# Patient Record
Sex: FEMALE | Race: WHITE | Employment: PART TIME | URBAN - METROPOLITAN AREA
[De-identification: names, ages, dates, MRNs, and addresses within clinical notes are randomized per-mention and may not be internally consistent; named-entity substitution may affect disease eponyms.]

---

## 2020-02-16 ENCOUNTER — HOSPITAL ENCOUNTER (EMERGENCY)
Age: 25
Discharge: HOME | End: 2020-02-16
Attending: EMERGENCY MEDICINE
Payer: COMMERCIAL

## 2020-02-16 VITALS
DIASTOLIC BLOOD PRESSURE: 73 MMHG | SYSTOLIC BLOOD PRESSURE: 139 MMHG | HEIGHT: 62 IN | BODY MASS INDEX: 40.48 KG/M2 | HEART RATE: 101 BPM | OXYGEN SATURATION: 98 % | TEMPERATURE: 97.5 F | WEIGHT: 220 LBS | RESPIRATION RATE: 16 BRPM

## 2020-02-16 PROCEDURE — 99282 EMERGENCY DEPT VISIT SF MDM: CPT

## 2020-02-16 RX ORDER — SULFAMETHOXAZOLE AND TRIMETHOPRIM 800; 160 MG/1; MG/1
1 TABLET ORAL 2 TIMES DAILY
Qty: 14 TABLET | Refills: 0 | Status: SHIPPED | OUTPATIENT
Start: 2020-02-16 | End: 2020-02-23

## 2020-02-16 RX ORDER — TRAMADOL HYDROCHLORIDE 50 MG/1
50 TABLET ORAL EVERY 8 HOURS PRN
Qty: 15 TABLET | Refills: 0 | Status: SHIPPED | OUTPATIENT
Start: 2020-02-16 | End: 2020-02-21

## 2020-02-16 RX ORDER — CEPHALEXIN 500 MG/1
500 CAPSULE ORAL 4 TIMES DAILY
Qty: 28 CAPSULE | Refills: 0 | Status: SHIPPED | OUTPATIENT
Start: 2020-02-16 | End: 2020-02-23

## 2020-02-16 SDOH — HEALTH STABILITY: MENTAL HEALTH: HOW OFTEN DO YOU HAVE A DRINK CONTAINING ALCOHOL?: NEVER

## 2020-02-16 ASSESSMENT — PAIN SCALES - GENERAL: PAINLEVEL_OUTOF10: 9

## 2020-02-16 NOTE — ED NOTES
Pt given instructions for follow-up and discharge. Pt given education on prescriptions. Pt verbalizes understanding. Pt is A&O x4, PWD, eupneic, and ambulatory with steady, even gait upon discharge.       Vanna Tipton RN  02/16/20 7800

## 2020-02-16 NOTE — ED PROVIDER NOTES
[address]  eMERGENCY dEPARTMENT eNCOUnter      Pt Name: Deedee Donald  MRN: [id number]  [place] 1995  Date of evaluation: 2/16/2020  Provider: ADRIANO Eduardo    CHIEF COMPLAINT       Chief Complaint   Patient presents with    Abscess     Lower lip           HISTORY OF PRESENT ILLNESS  (Location/Symptom, Timing/Onset, Context/Setting, Quality, Duration, Modifying Factors, Severity.)   Deedee Donald is a 30 y.o. female who presents to the emergency department c/o rash to lower lip/chin. States rash started 3 days ago. Denies any trouble breathing or swallowing. Denies any abd pain, cp, sob, n/v/d/c. Denies any fevers. States had small pimple 3 days ago and poked it with a needle at home      Context/Setting:   Quality: itchy  Duration: constant  Modifying Factors: none  Severity: mild    Nursing Notes were reviewed. REVIEW OF SYSTEMS    (2-9 systems for level 4, 10 or more for level 5)     Review of Systems   C/o rash  Denies trouble breathing  Denies cp  Denies fevers. Except as noted above the remainder of the review of systems was reviewed and negative. PAST MEDICAL HISTORY     Past Medical History:   Diagnosis Date    Anxiety     Asthma     Depression     Headache     IBS (irritable bowel syndrome)      None otherwise stated in nurses notes    SURGICAL HISTORY     History reviewed. No pertinent surgical history. None otherwise stated in nurses notes    CURRENT MEDICATIONS       Previous Medications    No medications on file       ALLERGIES     Gentamicin and Hydrocodone    FAMILY HISTORY     History reviewed. No pertinent family history. No family status information on file. None otherwise stated in nurses notes    SOCIAL HISTORY      reports that she has been smoking. She has never used smokeless tobacco. She reports that she does not drink alcohol or use drugs.    lives at home with others     PHYSICAL EXAM    (up to 7 for level 4, 8 or more for level 5)     ED Triage Vitals [02/16/20 1636]   BP Temp Temp Source Pulse Resp SpO2 Height Weight   139/73 97.5 °F (36.4 °C) Oral 101 16 98 % 5' 2\" (1.575 m) 220 lb (99.8 kg)       Physical Exam   Nursing note and vitals reviewed. Constitutional: Oriented to person, place, and time and well-developed, well-nourished. Head: Normocephalic and atraumatic. Ear: External ears normal.   Nose: Nose normal and midline. Eyes: Conjunctivae and EOM are normal. Pupils are equal, round, and reactive to light. Neck: Normal range of motion. Throat: Posterior pharynx is without erythema or exudates, airway is patent, no swelling    Musculoskeletal: Normal range of motion. Neurological: Alert and oriented to person, place, and time. GCS score is 15. Skin: rash to lower lip/chin consistant with cellulitis. No abscess or fluctuance  Psychiatric: Mood, memory, affect and judgment normal.           DIAGNOSTIC RESULTS     EKG: All EKG's are interpreted by the Emergency Department Physician who either signs or Co-signs this chart in the absence of a cardiologist.        RADIOLOGY:   All plain film, CT, MRI, and formal ultrasound images (except ED bedside ultrasound) are read by the radiologist and the images and interpretations are directly viewed by the emergency physician. No orders to display       No results found. LABS:  Labs Reviewed - No data to display    All other labs were within normal range or not returned as of this dictation. EMERGENCY DEPARTMENT COURSE and DIFFERENTIAL DIAGNOSIS/MDM:   Vitals:    Vitals:    02/16/20 1636   BP: 139/73   Pulse: 101   Resp: 16   Temp: 97.5 °F (36.4 °C)   TempSrc: Oral   SpO2: 98%   Weight: 220 lb (99.8 kg)   Height: 5' 2\" (1.575 m)       Warm compresses, abx. Pain meds.  F/u with clinic list  Patient instructed to return to the emergency room if symptoms worsen, return, or any other concern right away which is agreed by the patient    ED MEDS:  Orders Placed This Encounter Medications    cephALEXin (KEFLEX) 500 MG capsule     Sig: Take 1 capsule by mouth 4 times daily for 7 days     Dispense:  28 capsule     Refill:  0    sulfamethoxazole-trimethoprim (BACTRIM DS) 800-160 MG per tablet     Sig: Take 1 tablet by mouth 2 times daily for 7 days     Dispense:  14 tablet     Refill:  0    traMADol (ULTRAM) 50 MG tablet     Sig: Take 1 tablet by mouth every 8 hours as needed for Pain for up to 5 days. Intended supply: 3 days. Take lowest dose possible to manage pain     Dispense:  15 tablet     Refill:  0         CONSULTS:  None    PROCEDURES:  None      FINAL IMPRESSION      1. Facial cellulitis          DISPOSITION/PLAN   DISPOSITION Decision To Discharge    PATIENT REFERRED TO:  clinic list    Schedule an appointment as soon as possible for a visit       [facility] ED  [place] [id number] [phone]    For worsening symptoms, or any other concern      DISCHARGE MEDICATIONS:  New Prescriptions    CEPHALEXIN (KEFLEX) 500 MG CAPSULE    Take 1 capsule by mouth 4 times daily for 7 days    SULFAMETHOXAZOLE-TRIMETHOPRIM (BACTRIM DS) 800-160 MG PER TABLET    Take 1 tablet by mouth 2 times daily for 7 days    TRAMADOL (ULTRAM) 50 MG TABLET    Take 1 tablet by mouth every 8 hours as needed for Pain for up to 5 days. Intended supply: 3 days.  Take lowest dose possible to manage pain       (Please note that portions of this note were completed with a voice recognition program.  Efforts were made to edit the dictations but occasionally words are mis-transcribed.)    Arlyn Ramos, [facility] [place]  02/16/20 4429

## 2020-02-16 NOTE — ED PROVIDER NOTES
[address]  Emergency Department  Independent Attestation     Pt Name: Giovanny Westbrook  MRN: [id number]  [place] 1995  Date of evaluation: 2/16/20       Giovanny Westbrook is a 30 y.o. female who presents with Abscess (Lower lip)      I was personally available for consultation in the Emergency Department.     Brendon Cuba DO  Attending Emergency Physician  [address]      (Please note that portions of this note were completed with a voice recognition program.  Efforts were made to edit the dictations but occasionally words are mis-transcribed.)        Brendon Cuba DO  02/16/20 8904

## 2020-02-16 NOTE — LETTER
[address] ED  [address] [facility] [address]  Phone: [phone]             February 16, 2020    Patient: Jessica Person   YOB: 1995   Date of Visit: 2/16/2020       To Whom It May Concern:    Jessica Person was seen and treated in our emergency department on 2/16/2020. She may return to work on 2/18/2020.       Sincerely,             Signature:__________________________________

## 2020-03-02 ENCOUNTER — HOSPITAL ENCOUNTER (EMERGENCY)
Age: 25
Discharge: HOME | End: 2020-03-02
Attending: EMERGENCY MEDICINE
Payer: COMMERCIAL

## 2020-03-02 ENCOUNTER — APPOINTMENT (OUTPATIENT)
Dept: GENERAL RADIOLOGY | Age: 25
End: 2020-03-02
Payer: COMMERCIAL

## 2020-03-02 VITALS
DIASTOLIC BLOOD PRESSURE: 69 MMHG | RESPIRATION RATE: 18 BRPM | HEART RATE: 90 BPM | WEIGHT: 220 LBS | OXYGEN SATURATION: 99 % | TEMPERATURE: 98 F | BODY MASS INDEX: 40.24 KG/M2 | SYSTOLIC BLOOD PRESSURE: 123 MMHG

## 2020-03-02 LAB
D-DIMER QUANTITATIVE: 0.41 MG/L FEU (ref 0–0.59)
DIRECT EXAM: NORMAL
Lab: NORMAL
SPECIMEN DESCRIPTION: NORMAL

## 2020-03-02 PROCEDURE — 36415 COLL VENOUS BLD VENIPUNCTURE: CPT

## 2020-03-02 PROCEDURE — 71046 X-RAY EXAM CHEST 2 VIEWS: CPT

## 2020-03-02 PROCEDURE — 99285 EMERGENCY DEPT VISIT HI MDM: CPT

## 2020-03-02 PROCEDURE — 96372 THER/PROPH/DIAG INJ SC/IM: CPT

## 2020-03-02 PROCEDURE — 6360000002 HC RX W HCPCS: Performed by: PHYSICIAN ASSISTANT

## 2020-03-02 PROCEDURE — 87804 INFLUENZA ASSAY W/OPTIC: CPT

## 2020-03-02 PROCEDURE — 85379 FIBRIN DEGRADATION QUANT: CPT

## 2020-03-02 PROCEDURE — 6370000000 HC RX 637 (ALT 250 FOR IP): Performed by: PHYSICIAN ASSISTANT

## 2020-03-02 RX ORDER — PREDNISONE 20 MG/1
60 TABLET ORAL ONCE
Status: COMPLETED | OUTPATIENT
Start: 2020-03-02 | End: 2020-03-02

## 2020-03-02 RX ORDER — BENZONATATE 100 MG/1
100 CAPSULE ORAL 3 TIMES DAILY PRN
Qty: 20 CAPSULE | Refills: 0 | Status: SHIPPED | OUTPATIENT
Start: 2020-03-02 | End: 2020-03-09

## 2020-03-02 RX ORDER — METHYLPREDNISOLONE 4 MG/1
TABLET ORAL
Qty: 1 KIT | Refills: 0 | Status: SHIPPED | OUTPATIENT
Start: 2020-03-02 | End: 2020-03-08

## 2020-03-02 RX ORDER — LORAZEPAM 2 MG/ML
1 INJECTION INTRAMUSCULAR ONCE
Status: DISCONTINUED | OUTPATIENT
Start: 2020-03-02 | End: 2020-03-02

## 2020-03-02 RX ORDER — ALBUTEROL SULFATE 90 UG/1
2 AEROSOL, METERED RESPIRATORY (INHALATION) EVERY 4 HOURS PRN
Qty: 1 INHALER | Refills: 0 | Status: SHIPPED | OUTPATIENT
Start: 2020-03-02 | End: 2020-05-12 | Stop reason: SDUPTHER

## 2020-03-02 RX ORDER — GUAIFENESIN/DEXTROMETHORPHAN 100-10MG/5
5 SYRUP ORAL 4 TIMES DAILY PRN
Qty: 1 BOTTLE | Refills: 0 | Status: SHIPPED | OUTPATIENT
Start: 2020-03-02 | End: 2020-03-12

## 2020-03-02 RX ORDER — HYDROXYZINE HYDROCHLORIDE 25 MG/1
25 TABLET, FILM COATED ORAL EVERY 6 HOURS PRN
Qty: 20 TABLET | Refills: 0 | Status: SHIPPED | OUTPATIENT
Start: 2020-03-02 | End: 2020-03-12

## 2020-03-02 RX ORDER — HYDROXYZINE HYDROCHLORIDE 50 MG/ML
50 INJECTION, SOLUTION INTRAMUSCULAR ONCE
Status: COMPLETED | OUTPATIENT
Start: 2020-03-02 | End: 2020-03-02

## 2020-03-02 RX ORDER — BENZONATATE 100 MG/1
200 CAPSULE ORAL ONCE
Status: COMPLETED | OUTPATIENT
Start: 2020-03-02 | End: 2020-03-02

## 2020-03-02 RX ADMIN — PREDNISONE 60 MG: 20 TABLET ORAL at 11:45

## 2020-03-02 RX ADMIN — BENZONATATE 200 MG: 100 CAPSULE ORAL at 10:25

## 2020-03-02 RX ADMIN — HYDROXYZINE HYDROCHLORIDE 50 MG: 50 INJECTION, SOLUTION INTRAMUSCULAR at 11:45

## 2020-03-02 ASSESSMENT — PAIN DESCRIPTION - LOCATION: LOCATION: GENERALIZED

## 2020-03-02 ASSESSMENT — PAIN SCALES - GENERAL: PAINLEVEL_OUTOF10: 7

## 2020-03-02 ASSESSMENT — PAIN DESCRIPTION - PAIN TYPE: TYPE: ACUTE PAIN

## 2020-03-02 NOTE — ED PROVIDER NOTES
EMERGENCY DEPARTMENT ENCOUNTER   INDEPENDENT ATTESTATION     Pt Name: Dax Cueva  MRN: [id number]  [place] 1995  Date of evaluation: 3/2/20     Dax Cueva is a 30 y.o. female with CC: Cough and Shortness of Breath      I was personally available for consultation in the Emergency Department.     Capo Mccann MD  Attending Emergency Physician                    Capo Mccann MD  03/02/20 8786

## 2020-03-02 NOTE — ED PROVIDER NOTES
difficulty breathing, no distress. Instructed to return to the emergency room if symptoms worsen, return, or any other concern right away which is agreed by the patient. Instructed to f/u with PCP in 2-3 days for re-evaluation. ED MEDS:  Orders Placed This Encounter   Medications    benzonatate (TESSALON) capsule 200 mg    predniSONE (DELTASONE) tablet 60 mg    albuterol sulfate  (90 Base) MCG/ACT inhaler     Sig: Inhale 2 puffs into the lungs every 4 hours as needed for Wheezing     Dispense:  1 Inhaler     Refill:  0    benzonatate (TESSALON) 100 MG capsule     Sig: Take 1 capsule by mouth 3 times daily as needed for Cough     Dispense:  20 capsule     Refill:  0    guaiFENesin-dextromethorphan (ROBITUSSIN DM) 100-10 MG/5ML syrup     Sig: Take 5 mLs by mouth 4 times daily as needed for Cough     Dispense:  1 Bottle     Refill:  0    methylPREDNISolone (MEDROL DOSEPACK) 4 MG tablet     Sig: Take by mouth. Dispense:  1 kit     Refill:  0    DISCONTD: LORazepam (ATIVAN) injection 1 mg    hydrOXYzine (VISTARIL) injection 50 mg    hydrOXYzine (ATARAX) 25 MG tablet     Sig: Take 1 tablet by mouth every 6 hours as needed for Anxiety     Dispense:  20 tablet     Refill:  0         CONSULTS:  None    PROCEDURES:  None      FINAL IMPRESSION      1.  Bronchitis          DISPOSITION/PLAN   DISPOSITION Decision To Discharge    PATIENT REFERRED TO:  your doctor or clinic list    Schedule an appointment as soon as possible for a visit       [facility] ED  Fedenirav Lomas [address] [place] [address]  [phone]    For worsening symptoms, or any other concern      DISCHARGE MEDICATIONS:  Discharge Medication List as of 3/2/2020 12:22 PM      START taking these medications    Details   albuterol sulfate  (90 Base) MCG/ACT inhaler Inhale 2 puffs into the lungs every 4 hours as needed for Wheezing, Disp-1 Inhaler, R-0Print      benzonatate (TESSALON) 100 MG capsule Take 1 capsule by mouth 3 times daily as needed for Cough, Disp-20 capsule, R-0Print      guaiFENesin-dextromethorphan (ROBITUSSIN DM) 100-10 MG/5ML syrup Take 5 mLs by mouth 4 times daily as needed for Cough, Disp-1 Bottle, R-0Print      methylPREDNISolone (MEDROL DOSEPACK) 4 MG tablet Take by mouth., Disp-1 kit, R-0Print      hydrOXYzine (ATARAX) 25 MG tablet Take 1 tablet by mouth every 6 hours as needed for Anxiety, Disp-20 tablet, R-0Print             (Please note that portions of this note were completed with a voice recognition program.  Efforts were made to edit the dictations but occasionally words are mis-transcribed.)    David Reyes, [address], [place]  03/03/20 1479

## 2020-03-31 ENCOUNTER — HOSPITAL ENCOUNTER (EMERGENCY)
Age: 25
Discharge: HOME | End: 2020-03-31
Payer: COMMERCIAL

## 2020-03-31 VITALS
SYSTOLIC BLOOD PRESSURE: 140 MMHG | BODY MASS INDEX: 40.48 KG/M2 | HEART RATE: 101 BPM | RESPIRATION RATE: 16 BRPM | TEMPERATURE: 97.3 F | WEIGHT: 220 LBS | HEIGHT: 62 IN | OXYGEN SATURATION: 100 % | DIASTOLIC BLOOD PRESSURE: 68 MMHG

## 2020-03-31 PROCEDURE — 99282 EMERGENCY DEPT VISIT SF MDM: CPT

## 2020-03-31 PROCEDURE — 12001 RPR S/N/AX/GEN/TRNK 2.5CM/<: CPT

## 2020-03-31 NOTE — ED PROVIDER NOTES
[address] ED  eMERGENCY dEPARTMENT eNCOUnter      Pt Name: Cloria Cheadle  MRN: [id number]  [place] 1995  Date of evaluation: 3/31/20      CHIEF COMPLAINT       Chief Complaint   Patient presents with    Laceration     Lt finger lacerations         HISTORY OF PRESENT ILLNESS    Cloria Cheadle is a 30 y.o. female who presents complaining of finger lacerations  The history is provided by the patient. Laceration   Location: She was using a slicer at work and she ran her left fifth finger and left fourth finger across the blade causing avulsion injury to the fourth and fifth finger. Depth:  Cutaneous  Bleeding: venous    Laceration mechanism:  Metal edge  Pain details:     Quality:  Aching    Severity:  No pain    Timing:  Intermittent  Foreign body present:  No foreign bodies  Relieved by:  Nothing  Worsened by:  Nothing  Ineffective treatments:  None tried  Tetanus status:  Up to date      REVIEW OF SYSTEMS       Review of Systems   Skin: Positive for wound. All other systems reviewed and are negative. PAST MEDICAL HISTORY     Past Medical History:   Diagnosis Date    Anxiety     Asthma     Depression     Headache     IBS (irritable bowel syndrome)        SURGICAL HISTORY     History reviewed. No pertinent surgical history. CURRENT MEDICATIONS       Discharge Medication List as of 3/31/2020  3:04 PM      CONTINUE these medications which have NOT CHANGED    Details   albuterol sulfate  (90 Base) MCG/ACT inhaler Inhale 2 puffs into the lungs every 4 hours as needed for Wheezing, Disp-1 Inhaler, R-0Print             ALLERGIES     is allergic to gentamicin and hydrocodone. FAMILY HISTORY     has no family status information on file. SOCIAL HISTORY      reports that she has been smoking cigarettes. She has a 10.00 pack-year smoking history. She has never used smokeless tobacco. She reports that she does not drink alcohol or use drugs.     PHYSICAL EXAM     INITIAL VITALS: BP (!) symptoms worsen      DISCHARGEMEDICATIONS:  Discharge Medication List as of 3/31/2020  3:04 PM          (Please note that portions of this note were completed with a voice recognition program.  Efforts were made to edit thedictations but occasionally words are mis-transcribed.)    AZALEA Dominguez PA-C  03/31/20 1519

## 2020-05-12 ENCOUNTER — APPOINTMENT (OUTPATIENT)
Dept: GENERAL RADIOLOGY | Age: 25
End: 2020-05-12
Payer: MEDICAID

## 2020-05-12 ENCOUNTER — HOSPITAL ENCOUNTER (EMERGENCY)
Age: 25
Discharge: HOME | End: 2020-05-12
Attending: EMERGENCY MEDICINE
Payer: MEDICAID

## 2020-05-12 VITALS
OXYGEN SATURATION: 100 % | BODY MASS INDEX: 40.48 KG/M2 | RESPIRATION RATE: 16 BRPM | TEMPERATURE: 98.3 F | HEART RATE: 110 BPM | SYSTOLIC BLOOD PRESSURE: 138 MMHG | DIASTOLIC BLOOD PRESSURE: 74 MMHG | HEIGHT: 62 IN | WEIGHT: 220 LBS

## 2020-05-12 PROCEDURE — 71045 X-RAY EXAM CHEST 1 VIEW: CPT

## 2020-05-12 PROCEDURE — 99283 EMERGENCY DEPT VISIT LOW MDM: CPT

## 2020-05-12 RX ORDER — ALBUTEROL SULFATE 90 UG/1
2 AEROSOL, METERED RESPIRATORY (INHALATION) EVERY 4 HOURS PRN
Qty: 1 INHALER | Refills: 0 | Status: SHIPPED | OUTPATIENT
Start: 2020-05-12 | End: 2021-02-28 | Stop reason: SDUPTHER

## 2020-05-12 ASSESSMENT — ENCOUNTER SYMPTOMS
SHORTNESS OF BREATH: 0
ABDOMINAL PAIN: 0
RHINORRHEA: 1
COUGH: 1

## 2020-05-12 NOTE — ED PROVIDER NOTES
does not drink alcohol or use drugs. PHYSICAL EXAM     INITIAL VITALS: /74   Pulse 110   Temp 98.3 °F (36.8 °C) (Oral)   Resp 16   Ht 5' 2\" (1.575 m)   Wt 220 lb (99.8 kg)   LMP 04/16/2020   SpO2 100%   BMI 40.24 kg/m²   General: NAD  Head: Normocephalic, atraumatic  Eye: Pupils equal round reactive to light, no conjunctivitis  Heart: Regular rate and rhythm no murmurs  Lungs: Clear to auscultation bilaterally, no respiratory distress  Chest wall: No crepitus, no tenderness palpation  Abdomen: Soft, nontender, nondistended, with no peritoneal signs  Neurologic: Patient is alert and oriented x3, motor and sensation is intact in all 4 extremities, cerebellar function is normal  Extremities: Full range of motion, no cyanosis, no edema, no signs of trauma, no tenderness to palpation    MEDICAL DECISION MAKING:     MDM    This is a 58-year-old presenting with a month of a cough. I explained to her that I cannot rule out the coronavirus. She does not qualify for any testing here at our institution as she does not meet any inpatient admission criteria. We obtained a chest x-ray that showed no sign of pneumonia. Differential diagnosis that she may have another viral upper respiratory infection, or seasonal allergies, or cough variant asthma. Given the fact that this is been going on for a month, that she has no fevers or shortness of breath and no severe illness, I doubt that this coughing is from the coronavirus. Provided the patient with outpatient coronavirus testing resources if this will help with her return to work , we did discuss coronavirus precautions, D/w pt treatment plan, warning precautions for prompt ED return and importance of close OP FU, she verbalizes understanding and agrees with the treatment plan.      DIAGNOSTIC RESULTS         RADIOLOGY:All plain film, CT, MRI, and formal ultrasound images (except ED bedside ultrasound) are read by the radiologist and the images and interpretations are directly viewed by the emergency physician. XR CHEST PORTABLE   Final Result   Portable chest exam is within normal limits             EMERGENCY DEPARTMENT COURSE:   Vitals:    Vitals:    05/12/20 0714   BP: 138/74   Pulse: 110   Resp: 16   Temp: 98.3 °F (36.8 °C)   TempSrc: Oral   SpO2: 100%   Weight: 220 lb (99.8 kg)   Height: 5' 2\" (1.575 m)       The patient was given the following medications while in the emergency department:  Orders Placed This Encounter   Medications    albuterol sulfate  (90 Base) MCG/ACT inhaler     Sig: Inhale 2 puffs into the lungs every 4 hours as needed for Wheezing     Dispense:  1 Inhaler     Refill:  0     -------------------------  CRITICAL CARE:   CONSULTS: None  PROCEDURES: Procedures     FINAL IMPRESSION      1. Cough    2. Upper respiratory tract infection, unspecified type          DISPOSITION/PLAN   DISPOSITION Decision To Discharge 05/12/2020 08:43:02 AM      PATIENT REFERRED TO:  No follow-up provider specified.     DISCHARGE MEDICATIONS:  Discharge Medication List as of 5/12/2020  8:57 AM            Dylan Bose MD  Attending Emergency Physician                      Dylan Bose MD  05/12/20 6973

## 2020-05-13 ENCOUNTER — CARE COORDINATION (OUTPATIENT)
Dept: CARE COORDINATION | Age: 25
End: 2020-05-13

## 2020-05-13 ENCOUNTER — TELEPHONE (OUTPATIENT)
Dept: ORTHOPEDIC SURGERY | Age: 25
End: 2020-05-13

## 2020-05-13 NOTE — CARE COORDINATION
patient returns call and states she is feeling much better. States they think it's just a flare up of her asthma. Patient does not have a PCP, has a list but not sure who is taking new patient's or is open. Patient enrolled in Loop Program.     Patient contacted regarding recent discharge and COVID-19 risk   Care Transition Nurse/ Ambulatory Care Manager contacted the patient by telephone to perform post discharge assessment. Verified name and  with patient as identifiers. Patient has following risk factors of: asthma. CTN/ACM reviewed discharge instructions, medical action plan and red flags related to discharge diagnosis. Reviewed and educated them on any new and changed medications related to discharge diagnosis. Advised obtaining a 90-day supply of all daily and as-needed medications. Education provided regarding infection prevention, and signs and symptoms of COVID-19 and when to seek medical attention with patient who verbalized understanding. Discussed exposure protocols and quarantine from [address] you at higher risk for severe illness  and given an opportunity for questions and concerns. The patient agrees to contact the COVID-19 hotline [phone] or PCP office for questions related to their healthcare. CTN/ACM provided contact information for future reference. From CDC: Are you at higher risk for severe illness?  Wash your hands often.  Avoid close contact (6 feet, which is about two arm lengths) with people who are sick.  Put distance between yourself and other people if COVID-19 is spreading in your community.  Clean and disinfect frequently touched surfaces.  Avoid all cruise travel and non-essential air travel.  Call your healthcare professional if you have concerns about COVID-19 and your underlying condition or if you are sick.     For more information on steps you can take to protect yourself, see CDC's How to Protect Yourself    Pt will be further monitored by COVID Loop Team based on severity of symptoms and risk factors. Patient referred for LOOP Program:   Patient's preferred e-mail:  [email or web address]  Patient's preferred phone number: [phone]  Care Plan: self monitoring  LOOP Provider: Denis/Lima  (MINNIE ONLY) Recommended Follow Up: needs a PCP

## 2020-05-20 ENCOUNTER — CARE COORDINATION (OUTPATIENT)
Dept: CARE COORDINATION | Age: 25
End: 2020-05-20

## 2020-05-27 ENCOUNTER — CARE COORDINATION (OUTPATIENT)
Dept: CARE COORDINATION | Age: 25
End: 2020-05-27

## 2020-06-22 ENCOUNTER — APPOINTMENT (OUTPATIENT)
Dept: ULTRASOUND IMAGING | Age: 25
DRG: 263 | End: 2020-06-22
Payer: COMMERCIAL

## 2020-06-22 ENCOUNTER — APPOINTMENT (OUTPATIENT)
Dept: CT IMAGING | Age: 25
DRG: 263 | End: 2020-06-22
Payer: COMMERCIAL

## 2020-06-22 ENCOUNTER — ANESTHESIA EVENT (OUTPATIENT)
Dept: OPERATING ROOM | Age: 25
DRG: 263 | End: 2020-06-22
Payer: COMMERCIAL

## 2020-06-22 ENCOUNTER — ANESTHESIA (OUTPATIENT)
Dept: OPERATING ROOM | Age: 25
DRG: 263 | End: 2020-06-22
Payer: COMMERCIAL

## 2020-06-22 ENCOUNTER — HOSPITAL ENCOUNTER (INPATIENT)
Age: 25
LOS: 1 days | Discharge: HOME | DRG: 263 | End: 2020-06-23
Attending: EMERGENCY MEDICINE | Admitting: SURGERY
Payer: COMMERCIAL

## 2020-06-22 VITALS
RESPIRATION RATE: 11 BRPM | OXYGEN SATURATION: 97 % | SYSTOLIC BLOOD PRESSURE: 107 MMHG | DIASTOLIC BLOOD PRESSURE: 63 MMHG

## 2020-06-22 PROBLEM — Z72.0 TOBACCO ABUSE: Chronic | Status: ACTIVE | Noted: 2020-06-22

## 2020-06-22 PROBLEM — K81.0 ACUTE CHOLECYSTITIS: Status: ACTIVE | Noted: 2020-06-22

## 2020-06-22 PROBLEM — F12.10 MARIJUANA ABUSE: Chronic | Status: ACTIVE | Noted: 2020-06-22

## 2020-06-22 PROBLEM — E66.812 CLASS 2 OBESITY DUE TO EXCESS CALORIES WITHOUT SERIOUS COMORBIDITY IN ADULT: Chronic | Status: ACTIVE | Noted: 2020-06-22

## 2020-06-22 PROBLEM — E66.09 CLASS 2 OBESITY DUE TO EXCESS CALORIES WITHOUT SERIOUS COMORBIDITY IN ADULT: Chronic | Status: ACTIVE | Noted: 2020-06-22

## 2020-06-22 LAB
-: ABNORMAL
ABSOLUTE EOS #: 0.16 K/UL (ref 0–0.4)
ABSOLUTE IMMATURE GRANULOCYTE: ABNORMAL K/UL (ref 0–0.3)
ABSOLUTE LYMPH #: 1.4 K/UL (ref 1–4.8)
ABSOLUTE MONO #: 0.47 K/UL (ref 0.1–1.3)
ACETAMINOPHEN LEVEL: <5 UG/ML (ref 10–30)
ALBUMIN SERPL-MCNC: 4.4 G/DL (ref 3.5–5.2)
ALBUMIN/GLOBULIN RATIO: ABNORMAL (ref 1–2.5)
ALP BLD-CCNC: 67 U/L (ref 35–104)
ALT SERPL-CCNC: 19 U/L (ref 5–33)
AMORPHOUS: ABNORMAL
AMPHETAMINE SCREEN URINE: NEGATIVE
ANION GAP SERPL CALCULATED.3IONS-SCNC: 13 MMOL/L (ref 9–17)
AST SERPL-CCNC: 16 U/L
BACTERIA: ABNORMAL
BARBITURATE SCREEN URINE: NEGATIVE
BASOPHILS # BLD: 0 % (ref 0–2)
BASOPHILS ABSOLUTE: 0 K/UL (ref 0–0.2)
BENZODIAZEPINE SCREEN, URINE: NEGATIVE
BILIRUB SERPL-MCNC: 0.22 MG/DL (ref 0.3–1.2)
BILIRUBIN URINE: NEGATIVE
BUN BLDV-MCNC: 14 MG/DL (ref 6–20)
BUN/CREAT BLD: ABNORMAL (ref 9–20)
BUPRENORPHINE URINE: ABNORMAL
CALCIUM SERPL-MCNC: 9.3 MG/DL (ref 8.6–10.4)
CANNABINOID SCREEN URINE: POSITIVE
CASTS UA: ABNORMAL /LPF
CHLORIDE BLD-SCNC: 101 MMOL/L (ref 98–107)
CO2: 24 MMOL/L (ref 20–31)
COCAINE METABOLITE, URINE: NEGATIVE
COLOR: YELLOW
COMMENT UA: ABNORMAL
CREAT SERPL-MCNC: 0.86 MG/DL (ref 0.5–0.9)
CRYSTALS, UA: ABNORMAL /HPF
DIFFERENTIAL TYPE: ABNORMAL
EOSINOPHILS RELATIVE PERCENT: 1 % (ref 0–4)
EPITHELIAL CELLS UA: ABNORMAL /HPF
ETHANOL PERCENT: <0.01 %
ETHANOL: <10 MG/DL
GFR AFRICAN AMERICAN: >60 ML/MIN
GFR NON-AFRICAN AMERICAN: >60 ML/MIN
GFR SERPL CREATININE-BSD FRML MDRD: ABNORMAL ML/MIN/{1.73_M2}
GFR SERPL CREATININE-BSD FRML MDRD: ABNORMAL ML/MIN/{1.73_M2}
GLUCOSE BLD-MCNC: 124 MG/DL (ref 70–99)
GLUCOSE URINE: NEGATIVE
HCG QUALITATIVE: NEGATIVE
HCT VFR BLD CALC: 42.7 % (ref 36–46)
HEMOGLOBIN: 14.1 G/DL (ref 12–16)
IMMATURE GRANULOCYTES: ABNORMAL %
KETONES, URINE: ABNORMAL
LEUKOCYTE ESTERASE, URINE: ABNORMAL
LIPASE: 33 U/L (ref 13–60)
LYMPHOCYTES # BLD: 9 % (ref 24–44)
MCH RBC QN AUTO: 29.8 PG (ref 26–34)
MCHC RBC AUTO-ENTMCNC: 33 G/DL (ref 31–37)
MCV RBC AUTO: 90.4 FL (ref 80–100)
MDMA URINE: ABNORMAL
METHADONE SCREEN, URINE: NEGATIVE
METHAMPHETAMINE, URINE: ABNORMAL
MONOCYTES # BLD: 3 % (ref 1–7)
MORPHOLOGY: NORMAL
MUCUS: ABNORMAL
NITRITE, URINE: NEGATIVE
NRBC AUTOMATED: ABNORMAL PER 100 WBC
OPIATES, URINE: POSITIVE
OTHER OBSERVATIONS UA: ABNORMAL
OXYCODONE SCREEN URINE: NEGATIVE
PDW BLD-RTO: 14.4 % (ref 11.5–14.9)
PH UA: 7 (ref 5–8)
PHENCYCLIDINE, URINE: NEGATIVE
PLATELET # BLD: 288 K/UL (ref 150–450)
PLATELET ESTIMATE: ABNORMAL
PMV BLD AUTO: 8.1 FL (ref 6–12)
POTASSIUM SERPL-SCNC: 3.8 MMOL/L (ref 3.7–5.3)
PROPOXYPHENE, URINE: ABNORMAL
PROTEIN UA: ABNORMAL
RBC # BLD: 4.72 M/UL (ref 4–5.2)
RBC # BLD: ABNORMAL 10*6/UL
RBC UA: ABNORMAL /HPF
RENAL EPITHELIAL, UA: ABNORMAL /HPF
SALICYLATE LEVEL: <1 MG/DL (ref 3–10)
SARS-COV-2, PCR: NORMAL
SARS-COV-2, RAPID: NOT DETECTED
SARS-COV-2: NORMAL
SEG NEUTROPHILS: 87 % (ref 36–66)
SEGMENTED NEUTROPHILS ABSOLUTE COUNT: 13.57 K/UL (ref 1.3–9.1)
SODIUM BLD-SCNC: 138 MMOL/L (ref 135–144)
SOURCE: NORMAL
SPECIFIC GRAVITY UA: 1.03 (ref 1–1.03)
TEST INFORMATION: ABNORMAL
TOTAL PROTEIN: 7.4 G/DL (ref 6.4–8.3)
TOXIC TRICYCLIC SC,BLOOD: ABNORMAL
TRICHOMONAS: ABNORMAL
TRICYCLIC ANTIDEP,URINE: NEGATIVE
TRICYCLIC ANTIDEPRESSANTS, UR: ABNORMAL
TURBIDITY: ABNORMAL
URINE HGB: NEGATIVE
UROBILINOGEN, URINE: NORMAL
WBC # BLD: 15.6 K/UL (ref 3.5–11)
WBC # BLD: ABNORMAL 10*3/UL
WBC UA: ABNORMAL /HPF
YEAST: ABNORMAL

## 2020-06-22 PROCEDURE — 2500000003 HC RX 250 WO HCPCS: Performed by: NURSE ANESTHETIST, CERTIFIED REGISTERED

## 2020-06-22 PROCEDURE — 99285 EMERGENCY DEPT VISIT HI MDM: CPT

## 2020-06-22 PROCEDURE — 2580000003 HC RX 258: Performed by: EMERGENCY MEDICINE

## 2020-06-22 PROCEDURE — 83690 ASSAY OF LIPASE: CPT

## 2020-06-22 PROCEDURE — 2500000003 HC RX 250 WO HCPCS: Performed by: SURGERY

## 2020-06-22 PROCEDURE — 6360000002 HC RX W HCPCS: Performed by: NURSE ANESTHETIST, CERTIFIED REGISTERED

## 2020-06-22 PROCEDURE — 7100000001 HC PACU RECOVERY - ADDTL 15 MIN: Performed by: SURGERY

## 2020-06-22 PROCEDURE — 85025 COMPLETE CBC W/AUTO DIFF WBC: CPT

## 2020-06-22 PROCEDURE — 1200000000 HC SEMI PRIVATE

## 2020-06-22 PROCEDURE — 0FT44ZZ RESECTION OF GALLBLADDER, PERCUTANEOUS ENDOSCOPIC APPROACH: ICD-10-PCS | Performed by: SURGERY

## 2020-06-22 PROCEDURE — 2580000003 HC RX 258: Performed by: ANESTHESIOLOGY

## 2020-06-22 PROCEDURE — 6360000002 HC RX W HCPCS: Performed by: ANESTHESIOLOGY

## 2020-06-22 PROCEDURE — 3700000000 HC ANESTHESIA ATTENDED CARE: Performed by: SURGERY

## 2020-06-22 PROCEDURE — 88304 TISSUE EXAM BY PATHOLOGIST: CPT

## 2020-06-22 PROCEDURE — 3600000013 HC SURGERY LEVEL 3 ADDTL 15MIN: Performed by: SURGERY

## 2020-06-22 PROCEDURE — 96375 TX/PRO/DX INJ NEW DRUG ADDON: CPT

## 2020-06-22 PROCEDURE — 36415 COLL VENOUS BLD VENIPUNCTURE: CPT

## 2020-06-22 PROCEDURE — 84703 CHORIONIC GONADOTROPIN ASSAY: CPT

## 2020-06-22 PROCEDURE — 6370000000 HC RX 637 (ALT 250 FOR IP): Performed by: NURSE ANESTHETIST, CERTIFIED REGISTERED

## 2020-06-22 PROCEDURE — 80307 DRUG TEST PRSMV CHEM ANLYZR: CPT

## 2020-06-22 PROCEDURE — 2720000010 HC SURG SUPPLY STERILE: Performed by: SURGERY

## 2020-06-22 PROCEDURE — 3700000001 HC ADD 15 MINUTES (ANESTHESIA): Performed by: SURGERY

## 2020-06-22 PROCEDURE — 74176 CT ABD & PELVIS W/O CONTRAST: CPT

## 2020-06-22 PROCEDURE — 6370000000 HC RX 637 (ALT 250 FOR IP): Performed by: SURGERY

## 2020-06-22 PROCEDURE — 80053 COMPREHEN METABOLIC PANEL: CPT

## 2020-06-22 PROCEDURE — 7100000000 HC PACU RECOVERY - FIRST 15 MIN: Performed by: SURGERY

## 2020-06-22 PROCEDURE — 6360000002 HC RX W HCPCS: Performed by: EMERGENCY MEDICINE

## 2020-06-22 PROCEDURE — 6360000002 HC RX W HCPCS: Performed by: SURGERY

## 2020-06-22 PROCEDURE — G0480 DRUG TEST DEF 1-7 CLASSES: HCPCS

## 2020-06-22 PROCEDURE — 76705 ECHO EXAM OF ABDOMEN: CPT

## 2020-06-22 PROCEDURE — 81001 URINALYSIS AUTO W/SCOPE: CPT

## 2020-06-22 PROCEDURE — 2709999900 HC NON-CHARGEABLE SUPPLY: Performed by: SURGERY

## 2020-06-22 PROCEDURE — 6370000000 HC RX 637 (ALT 250 FOR IP): Performed by: ANESTHESIOLOGY

## 2020-06-22 PROCEDURE — 96374 THER/PROPH/DIAG INJ IV PUSH: CPT

## 2020-06-22 PROCEDURE — 3600000003 HC SURGERY LEVEL 3 BASE: Performed by: SURGERY

## 2020-06-22 PROCEDURE — U0002 COVID-19 LAB TEST NON-CDC: HCPCS

## 2020-06-22 RX ORDER — SODIUM CHLORIDE 0.9 % (FLUSH) 0.9 %
10 SYRINGE (ML) INJECTION EVERY 12 HOURS SCHEDULED
Status: DISCONTINUED | OUTPATIENT
Start: 2020-06-22 | End: 2020-06-23 | Stop reason: HOSPADM

## 2020-06-22 RX ORDER — ONDANSETRON 2 MG/ML
4 INJECTION INTRAMUSCULAR; INTRAVENOUS ONCE
Status: COMPLETED | OUTPATIENT
Start: 2020-06-22 | End: 2020-06-22

## 2020-06-22 RX ORDER — SODIUM CHLORIDE 0.9 % (FLUSH) 0.9 %
10 SYRINGE (ML) INJECTION PRN
Status: DISCONTINUED | OUTPATIENT
Start: 2020-06-22 | End: 2020-06-23 | Stop reason: HOSPADM

## 2020-06-22 RX ORDER — FENTANYL CITRATE 50 UG/ML
INJECTION, SOLUTION INTRAMUSCULAR; INTRAVENOUS PRN
Status: DISCONTINUED | OUTPATIENT
Start: 2020-06-22 | End: 2020-06-22 | Stop reason: SDUPTHER

## 2020-06-22 RX ORDER — KETOROLAC TROMETHAMINE 30 MG/ML
30 INJECTION, SOLUTION INTRAMUSCULAR; INTRAVENOUS ONCE
Status: COMPLETED | OUTPATIENT
Start: 2020-06-22 | End: 2020-06-22

## 2020-06-22 RX ORDER — MORPHINE SULFATE 4 MG/ML
4 INJECTION, SOLUTION INTRAMUSCULAR; INTRAVENOUS ONCE
Status: COMPLETED | OUTPATIENT
Start: 2020-06-22 | End: 2020-06-22

## 2020-06-22 RX ORDER — MIDAZOLAM HYDROCHLORIDE 1 MG/ML
INJECTION INTRAMUSCULAR; INTRAVENOUS PRN
Status: DISCONTINUED | OUTPATIENT
Start: 2020-06-22 | End: 2020-06-22 | Stop reason: SDUPTHER

## 2020-06-22 RX ORDER — POLYETHYLENE GLYCOL 3350 17 G/17G
17 POWDER, FOR SOLUTION ORAL DAILY PRN
Status: DISCONTINUED | OUTPATIENT
Start: 2020-06-22 | End: 2020-06-23 | Stop reason: HOSPADM

## 2020-06-22 RX ORDER — MEPERIDINE HYDROCHLORIDE 25 MG/ML
12.5 INJECTION INTRAMUSCULAR; INTRAVENOUS; SUBCUTANEOUS EVERY 5 MIN PRN
Status: DISCONTINUED | OUTPATIENT
Start: 2020-06-22 | End: 2020-06-22 | Stop reason: HOSPADM

## 2020-06-22 RX ORDER — DEXTROSE, SODIUM CHLORIDE, AND POTASSIUM CHLORIDE 5; .45; .15 G/100ML; G/100ML; G/100ML
INJECTION INTRAVENOUS CONTINUOUS
Status: DISCONTINUED | OUTPATIENT
Start: 2020-06-22 | End: 2020-06-23

## 2020-06-22 RX ORDER — NICOTINE 21 MG/24HR
1 PATCH, TRANSDERMAL 24 HOURS TRANSDERMAL DAILY
Status: DISCONTINUED | OUTPATIENT
Start: 2020-06-22 | End: 2020-06-23 | Stop reason: HOSPADM

## 2020-06-22 RX ORDER — TRAMADOL HYDROCHLORIDE 50 MG/1
50 TABLET ORAL EVERY 6 HOURS PRN
Status: DISCONTINUED | OUTPATIENT
Start: 2020-06-22 | End: 2020-06-23

## 2020-06-22 RX ORDER — DEXAMETHASONE SODIUM PHOSPHATE 4 MG/ML
INJECTION, SOLUTION INTRA-ARTICULAR; INTRALESIONAL; INTRAMUSCULAR; INTRAVENOUS; SOFT TISSUE PRN
Status: DISCONTINUED | OUTPATIENT
Start: 2020-06-22 | End: 2020-06-22 | Stop reason: SDUPTHER

## 2020-06-22 RX ORDER — LABETALOL 20 MG/4 ML (5 MG/ML) INTRAVENOUS SYRINGE
5 EVERY 10 MIN PRN
Status: DISCONTINUED | OUTPATIENT
Start: 2020-06-22 | End: 2020-06-22 | Stop reason: HOSPADM

## 2020-06-22 RX ORDER — PROMETHAZINE HYDROCHLORIDE 25 MG/ML
6.25 INJECTION, SOLUTION INTRAMUSCULAR; INTRAVENOUS
Status: DISCONTINUED | OUTPATIENT
Start: 2020-06-22 | End: 2020-06-22 | Stop reason: HOSPADM

## 2020-06-22 RX ORDER — 0.9 % SODIUM CHLORIDE 0.9 %
500 INTRAVENOUS SOLUTION INTRAVENOUS
Status: DISCONTINUED | OUTPATIENT
Start: 2020-06-22 | End: 2020-06-22 | Stop reason: HOSPADM

## 2020-06-22 RX ORDER — SODIUM CHLORIDE, SODIUM LACTATE, POTASSIUM CHLORIDE, CALCIUM CHLORIDE 600; 310; 30; 20 MG/100ML; MG/100ML; MG/100ML; MG/100ML
INJECTION, SOLUTION INTRAVENOUS CONTINUOUS
Status: DISCONTINUED | OUTPATIENT
Start: 2020-06-22 | End: 2020-06-22

## 2020-06-22 RX ORDER — ONDANSETRON 2 MG/ML
4 INJECTION INTRAMUSCULAR; INTRAVENOUS EVERY 6 HOURS PRN
Status: DISCONTINUED | OUTPATIENT
Start: 2020-06-22 | End: 2020-06-23 | Stop reason: HOSPADM

## 2020-06-22 RX ORDER — 0.9 % SODIUM CHLORIDE 0.9 %
1000 INTRAVENOUS SOLUTION INTRAVENOUS ONCE
Status: COMPLETED | OUTPATIENT
Start: 2020-06-22 | End: 2020-06-22

## 2020-06-22 RX ORDER — DIPHENHYDRAMINE HYDROCHLORIDE 50 MG/ML
12.5 INJECTION INTRAMUSCULAR; INTRAVENOUS
Status: DISCONTINUED | OUTPATIENT
Start: 2020-06-22 | End: 2020-06-22 | Stop reason: HOSPADM

## 2020-06-22 RX ORDER — ACETAMINOPHEN 325 MG/1
650 TABLET ORAL EVERY 6 HOURS PRN
Status: DISCONTINUED | OUTPATIENT
Start: 2020-06-22 | End: 2020-06-23 | Stop reason: HOSPADM

## 2020-06-22 RX ORDER — ROCURONIUM BROMIDE 10 MG/ML
INJECTION, SOLUTION INTRAVENOUS PRN
Status: DISCONTINUED | OUTPATIENT
Start: 2020-06-22 | End: 2020-06-22 | Stop reason: SDUPTHER

## 2020-06-22 RX ORDER — SCOLOPAMINE TRANSDERMAL SYSTEM 1 MG/1
1 PATCH, EXTENDED RELEASE TRANSDERMAL ONCE
Status: DISCONTINUED | OUTPATIENT
Start: 2020-06-22 | End: 2020-06-23 | Stop reason: HOSPADM

## 2020-06-22 RX ORDER — ACETAMINOPHEN 650 MG/1
650 SUPPOSITORY RECTAL EVERY 6 HOURS PRN
Status: DISCONTINUED | OUTPATIENT
Start: 2020-06-22 | End: 2020-06-23 | Stop reason: HOSPADM

## 2020-06-22 RX ORDER — MIDAZOLAM HYDROCHLORIDE 1 MG/ML
2 INJECTION INTRAMUSCULAR; INTRAVENOUS ONCE
Status: COMPLETED | OUTPATIENT
Start: 2020-06-22 | End: 2020-06-22

## 2020-06-22 RX ORDER — LIDOCAINE HYDROCHLORIDE 20 MG/ML
INJECTION, SOLUTION EPIDURAL; INFILTRATION; INTRACAUDAL; PERINEURAL PRN
Status: DISCONTINUED | OUTPATIENT
Start: 2020-06-22 | End: 2020-06-22 | Stop reason: SDUPTHER

## 2020-06-22 RX ORDER — PROPOFOL 10 MG/ML
INJECTION, EMULSION INTRAVENOUS PRN
Status: DISCONTINUED | OUTPATIENT
Start: 2020-06-22 | End: 2020-06-22 | Stop reason: SDUPTHER

## 2020-06-22 RX ORDER — ONDANSETRON 2 MG/ML
INJECTION INTRAMUSCULAR; INTRAVENOUS PRN
Status: DISCONTINUED | OUTPATIENT
Start: 2020-06-22 | End: 2020-06-22 | Stop reason: SDUPTHER

## 2020-06-22 RX ORDER — ACETAMINOPHEN 325 MG/1
650 TABLET ORAL EVERY 4 HOURS PRN
Status: DISCONTINUED | OUTPATIENT
Start: 2020-06-22 | End: 2020-06-23 | Stop reason: HOSPADM

## 2020-06-22 RX ORDER — BUPIVACAINE HYDROCHLORIDE 5 MG/ML
INJECTION, SOLUTION EPIDURAL; INTRACAUDAL PRN
Status: DISCONTINUED | OUTPATIENT
Start: 2020-06-22 | End: 2020-06-22 | Stop reason: ALTCHOICE

## 2020-06-22 RX ORDER — HYDRALAZINE HYDROCHLORIDE 20 MG/ML
5 INJECTION INTRAMUSCULAR; INTRAVENOUS EVERY 10 MIN PRN
Status: DISCONTINUED | OUTPATIENT
Start: 2020-06-22 | End: 2020-06-22 | Stop reason: HOSPADM

## 2020-06-22 RX ORDER — FENTANYL CITRATE 50 UG/ML
25 INJECTION, SOLUTION INTRAMUSCULAR; INTRAVENOUS EVERY 5 MIN PRN
Status: DISCONTINUED | OUTPATIENT
Start: 2020-06-22 | End: 2020-06-22 | Stop reason: HOSPADM

## 2020-06-22 RX ORDER — ALBUTEROL SULFATE 90 UG/1
AEROSOL, METERED RESPIRATORY (INHALATION) PRN
Status: DISCONTINUED | OUTPATIENT
Start: 2020-06-22 | End: 2020-06-22 | Stop reason: SDUPTHER

## 2020-06-22 RX ORDER — PROMETHAZINE HYDROCHLORIDE 25 MG/1
12.5 TABLET ORAL EVERY 6 HOURS PRN
Status: DISCONTINUED | OUTPATIENT
Start: 2020-06-22 | End: 2020-06-23 | Stop reason: HOSPADM

## 2020-06-22 RX ORDER — METOCLOPRAMIDE HYDROCHLORIDE 5 MG/ML
10 INJECTION INTRAMUSCULAR; INTRAVENOUS
Status: DISCONTINUED | OUTPATIENT
Start: 2020-06-22 | End: 2020-06-22 | Stop reason: HOSPADM

## 2020-06-22 RX ORDER — SODIUM CHLORIDE 9 MG/ML
INJECTION, SOLUTION INTRAVENOUS CONTINUOUS
Status: DISCONTINUED | OUTPATIENT
Start: 2020-06-22 | End: 2020-06-22 | Stop reason: SDUPTHER

## 2020-06-22 RX ORDER — FAMOTIDINE 20 MG/1
20 TABLET, FILM COATED ORAL 2 TIMES DAILY
Status: DISCONTINUED | OUTPATIENT
Start: 2020-06-22 | End: 2020-06-23 | Stop reason: HOSPADM

## 2020-06-22 RX ADMIN — MORPHINE SULFATE 4 MG: 4 INJECTION, SOLUTION INTRAMUSCULAR; INTRAVENOUS at 07:23

## 2020-06-22 RX ADMIN — HYDROMORPHONE HYDROCHLORIDE 0.5 MG: 1 INJECTION, SOLUTION INTRAMUSCULAR; INTRAVENOUS; SUBCUTANEOUS at 17:05

## 2020-06-22 RX ADMIN — FAMOTIDINE 20 MG: 20 TABLET ORAL at 20:39

## 2020-06-22 RX ADMIN — LIDOCAINE HYDROCHLORIDE 50 MG: 20 INJECTION, SOLUTION EPIDURAL; INFILTRATION; INTRACAUDAL; PERINEURAL at 15:34

## 2020-06-22 RX ADMIN — MIDAZOLAM HYDROCHLORIDE 2 MG: 1 INJECTION, SOLUTION INTRAMUSCULAR; INTRAVENOUS at 13:05

## 2020-06-22 RX ADMIN — POTASSIUM CHLORIDE, DEXTROSE MONOHYDRATE AND SODIUM CHLORIDE: 150; 5; 450 INJECTION, SOLUTION INTRAVENOUS at 19:28

## 2020-06-22 RX ADMIN — HYDROMORPHONE HYDROCHLORIDE 0.5 MG: 1 INJECTION, SOLUTION INTRAMUSCULAR; INTRAVENOUS; SUBCUTANEOUS at 19:28

## 2020-06-22 RX ADMIN — SODIUM CHLORIDE, POTASSIUM CHLORIDE, SODIUM LACTATE AND CALCIUM CHLORIDE: 600; 310; 30; 20 INJECTION, SOLUTION INTRAVENOUS at 17:06

## 2020-06-22 RX ADMIN — SODIUM CHLORIDE, POTASSIUM CHLORIDE, SODIUM LACTATE AND CALCIUM CHLORIDE: 600; 310; 30; 20 INJECTION, SOLUTION INTRAVENOUS at 13:03

## 2020-06-22 RX ADMIN — ONDANSETRON 4 MG: 2 INJECTION INTRAMUSCULAR; INTRAVENOUS at 15:53

## 2020-06-22 RX ADMIN — FENTANYL CITRATE 50 MCG: 50 INJECTION, SOLUTION INTRAMUSCULAR; INTRAVENOUS at 16:20

## 2020-06-22 RX ADMIN — DEXAMETHASONE SODIUM PHOSPHATE 4 MG: 4 INJECTION, SOLUTION INTRA-ARTICULAR; INTRALESIONAL; INTRAMUSCULAR; INTRAVENOUS; SOFT TISSUE at 15:53

## 2020-06-22 RX ADMIN — SODIUM CHLORIDE 1000 ML: 9 INJECTION, SOLUTION INTRAVENOUS at 07:21

## 2020-06-22 RX ADMIN — PROPOFOL 150 MG: 10 INJECTION, EMULSION INTRAVENOUS at 15:34

## 2020-06-22 RX ADMIN — MIDAZOLAM 1 MG: 1 INJECTION INTRAMUSCULAR; INTRAVENOUS at 15:29

## 2020-06-22 RX ADMIN — KETOROLAC TROMETHAMINE 30 MG: 30 INJECTION, SOLUTION INTRAMUSCULAR; INTRAVENOUS at 17:20

## 2020-06-22 RX ADMIN — ONDANSETRON 4 MG: 2 INJECTION INTRAMUSCULAR; INTRAVENOUS at 07:21

## 2020-06-22 RX ADMIN — CEFTRIAXONE SODIUM 1 G: 1 INJECTION, POWDER, FOR SOLUTION INTRAMUSCULAR; INTRAVENOUS at 10:46

## 2020-06-22 RX ADMIN — SUGAMMADEX 197 MG: 100 INJECTION, SOLUTION INTRAVENOUS at 16:15

## 2020-06-22 RX ADMIN — HYDROMORPHONE HYDROCHLORIDE 0.5 MG: 1 INJECTION, SOLUTION INTRAMUSCULAR; INTRAVENOUS; SUBCUTANEOUS at 22:33

## 2020-06-22 RX ADMIN — FENTANYL CITRATE 50 MCG: 50 INJECTION, SOLUTION INTRAMUSCULAR; INTRAVENOUS at 16:21

## 2020-06-22 RX ADMIN — ALBUTEROL SULFATE 2 PUFF: 90 AEROSOL, METERED RESPIRATORY (INHALATION) at 15:41

## 2020-06-22 RX ADMIN — KETOROLAC TROMETHAMINE 30 MG: 30 INJECTION, SOLUTION INTRAMUSCULAR at 07:23

## 2020-06-22 RX ADMIN — MIDAZOLAM 1 MG: 1 INJECTION INTRAMUSCULAR; INTRAVENOUS at 15:34

## 2020-06-22 RX ADMIN — FENTANYL CITRATE 100 MCG: 50 INJECTION, SOLUTION INTRAMUSCULAR; INTRAVENOUS at 15:34

## 2020-06-22 RX ADMIN — ROCURONIUM BROMIDE 30 MG: 10 INJECTION, SOLUTION INTRAVENOUS at 15:34

## 2020-06-22 RX ADMIN — HYDROMORPHONE HYDROCHLORIDE 0.5 MG: 1 INJECTION, SOLUTION INTRAMUSCULAR; INTRAVENOUS; SUBCUTANEOUS at 16:53

## 2020-06-22 ASSESSMENT — PAIN SCALES - GENERAL
PAINLEVEL_OUTOF10: 6
PAINLEVEL_OUTOF10: 10
PAINLEVEL_OUTOF10: 8
PAINLEVEL_OUTOF10: 10
PAINLEVEL_OUTOF10: 6
PAINLEVEL_OUTOF10: 0
PAINLEVEL_OUTOF10: 10
PAINLEVEL_OUTOF10: 5
PAINLEVEL_OUTOF10: 2
PAINLEVEL_OUTOF10: 9
PAINLEVEL_OUTOF10: 9

## 2020-06-22 ASSESSMENT — PULMONARY FUNCTION TESTS
PIF_VALUE: 16
PIF_VALUE: 24
PIF_VALUE: 27
PIF_VALUE: 16
PIF_VALUE: 1
PIF_VALUE: 0
PIF_VALUE: 1
PIF_VALUE: 26
PIF_VALUE: 5
PIF_VALUE: 21
PIF_VALUE: 3
PIF_VALUE: 25
PIF_VALUE: 26
PIF_VALUE: 25
PIF_VALUE: 37
PIF_VALUE: 26
PIF_VALUE: 3
PIF_VALUE: 24
PIF_VALUE: 26
PIF_VALUE: 16
PIF_VALUE: 26
PIF_VALUE: 30
PIF_VALUE: 26
PIF_VALUE: 28
PIF_VALUE: 16
PIF_VALUE: 16
PIF_VALUE: 25
PIF_VALUE: 27
PIF_VALUE: 7
PIF_VALUE: 23
PIF_VALUE: 27
PIF_VALUE: 24
PIF_VALUE: 25
PIF_VALUE: 16
PIF_VALUE: 23
PIF_VALUE: 23
PIF_VALUE: 26
PIF_VALUE: 1
PIF_VALUE: 17
PIF_VALUE: 27
PIF_VALUE: 25
PIF_VALUE: 25
PIF_VALUE: 26
PIF_VALUE: 24
PIF_VALUE: 1
PIF_VALUE: 27
PIF_VALUE: 2
PIF_VALUE: 16
PIF_VALUE: 25
PIF_VALUE: 16
PIF_VALUE: 23
PIF_VALUE: 25
PIF_VALUE: 26
PIF_VALUE: 27
PIF_VALUE: 25
PIF_VALUE: 3
PIF_VALUE: 26
PIF_VALUE: 10
PIF_VALUE: 3
PIF_VALUE: 15
PIF_VALUE: 26
PIF_VALUE: 1
PIF_VALUE: 25
PIF_VALUE: 24
PIF_VALUE: 2

## 2020-06-22 ASSESSMENT — PAIN DESCRIPTION - PAIN TYPE
TYPE: SURGICAL PAIN
TYPE: ACUTE PAIN
TYPE: ACUTE PAIN
TYPE: ACUTE PAIN;SURGICAL PAIN
TYPE: SURGICAL PAIN

## 2020-06-22 ASSESSMENT — ENCOUNTER SYMPTOMS
COUGH: 0
TROUBLE SWALLOWING: 0
DIARRHEA: 0
BLOOD IN STOOL: 0
VOMITING: 1
SHORTNESS OF BREATH: 0
ABDOMINAL PAIN: 1
NAUSEA: 1
COLOR CHANGE: 0
SORE THROAT: 0
BACK PAIN: 0
CONSTIPATION: 0

## 2020-06-22 ASSESSMENT — PAIN DESCRIPTION - DESCRIPTORS
DESCRIPTORS: CONSTANT;DISCOMFORT;SHARP
DESCRIPTORS: SHARP
DESCRIPTORS: SHARP
DESCRIPTORS: SHARP;SHOOTING

## 2020-06-22 ASSESSMENT — PAIN DESCRIPTION - LOCATION
LOCATION: RIB CAGE
LOCATION: ABDOMEN

## 2020-06-22 ASSESSMENT — LIFESTYLE VARIABLES: SMOKING_STATUS: 1

## 2020-06-22 ASSESSMENT — PAIN DESCRIPTION - FREQUENCY
FREQUENCY: CONTINUOUS
FREQUENCY: CONTINUOUS

## 2020-06-22 ASSESSMENT — PAIN DESCRIPTION - ONSET: ONSET: ON-GOING

## 2020-06-22 ASSESSMENT — PAIN DESCRIPTION - PROGRESSION
CLINICAL_PROGRESSION: GRADUALLY WORSENING
CLINICAL_PROGRESSION: GRADUALLY IMPROVING

## 2020-06-22 ASSESSMENT — PAIN DESCRIPTION - ORIENTATION
ORIENTATION: RIGHT
ORIENTATION: LOWER

## 2020-06-22 ASSESSMENT — PAIN DESCRIPTION - DIRECTION: RADIATING_TOWARDS: ABDOMEN, LOWER BACK

## 2020-06-22 ASSESSMENT — PAIN - FUNCTIONAL ASSESSMENT: PAIN_FUNCTIONAL_ASSESSMENT: 0-10

## 2020-06-22 NOTE — ANESTHESIA PRE PROCEDURE
Department of Anesthesiology  Preprocedure Note       Name:  Olena Parker   Age:  30 y.o.  :  1995                                          MRN:  [id number]         Date:  2020      Surgeon: Fred Menon):  Rock Turner MD    Procedure: Procedure(s):  CHOLECYSTECTOMY LAPAROSCOPIC    Medications prior to admission:   Prior to Admission medications    Medication Sig Start Date End Date Taking? Authorizing Provider   albuterol sulfate  (90 Base) MCG/ACT inhaler Inhale 2 puffs into the lungs every 4 hours as needed for Wheezing 20  Christian Jane MD       Current medications:    Current Facility-Administered Medications   Medication Dose Route Frequency Provider Last Rate Last Dose    sodium chloride flush 0.9 % injection 10 mL  10 mL Intravenous 2 times per day Rock Turner MD        sodium chloride flush 0.9 % injection 10 mL  10 mL Intravenous PRN Rock Turner MD        acetaminophen (TYLENOL) tablet 650 mg  650 mg Oral Q6H PRN Rock Turner MD        Or    acetaminophen (TYLENOL) suppository 650 mg  650 mg Rectal Q6H PRN Rock Turner MD        polyethylene glycol (GLYCOLAX) packet 17 g  17 g Oral Daily PRN Rock Turner MD        promethazine (PHENERGAN) tablet 12.5 mg  12.5 mg Oral Q6H PRN Rock Turner MD        Or    ondansetron [facility]) injection 4 mg  4 mg Intravenous Q6H PRN Rock Turner MD        0.9 % sodium chloride infusion   Intravenous Continuous Rock Turner MD           Allergies:     Allergies   Allergen Reactions    Gentamicin     Hydrocodone        Problem List:    Patient Active Problem List   Diagnosis Code    Acute cholecystitis K81.0    IBS (irritable bowel syndrome) K58.9    Headache R51    Depression F32.9    Asthma J45.909    Anxiety F41.9    Class 2 obesity due to excess calories without serious comorbidity in adult E66.09    Tobacco abuse Z72.0    Marijuana abuse F12.10       Past Medical History:        Diagnosis Date    Anxiety     Asthma No results for input(s): POCGLU, POCNA, POCK, POCCL, POCBUN, POCHEMO, POCHCT in the last 72 hours. Coags: No results found for: PROTIME, INR, APTT    HCG (If Applicable): No results found for: PREGTESTUR, PREGSERUM, HCG, HCGQUANT     ABGs: No results found for: PHART, PO2ART, CYR2HLQ, HGJ8XJO, BEART, Y5PURYMK     Type & Screen (If Applicable):  No results found for: LABABO, LABRH    Drug/Infectious Status (If Applicable):  No results found for: HIV, HEPCAB    COVID-19 Screening (If Applicable):   Lab Results   Component Value Date    COVID19 Not Detected 06/22/2020         Anesthesia Evaluation  Patient summary reviewed and Nursing notes reviewed no history of anesthetic complications:   Airway: Mallampati: III  TM distance: >3 FB   Neck ROM: full  Mouth opening: > = 3 FB Dental:      Comment: Many missing teeth    Pulmonary:normal exam  breath sounds clear to auscultation  (+) asthma: current smoker (marijuana and tobacco)                           Cardiovascular:Negative CV ROS  Exercise tolerance: good (>4 METS),           Rhythm: regular  Rate: normal                    Neuro/Psych:   (+) headaches:, psychiatric history (panic attacks):depression/anxiety             GI/Hepatic/Renal:   (+) morbid obesity         ROS comment: Acute cholecystitis  N/V. Endo/Other:                     Abdominal:           Vascular:                                        Anesthesia Plan      general     ASA 3     (Preop versed  RSI GETA)  Induction: rapid sequence and intravenous. MIPS: Postoperative opioids intended and Prophylactic antiemetics administered. Anesthetic plan and risks discussed with patient. Plan discussed with CRNA.                   Mauro Zamora MD   6/22/2020

## 2020-06-22 NOTE — H&P
General Surgery   History and Physical      PATIENT NAME: Melia Halsted   YOB: 1995    ADMISSION DATE: 6/22/2020  6:52 AM      TODAY'S DATE: 6/22/2020    CHIEF COMPLAINT:  RUQ pain      HISTORY OF PRESENT ILLNESS:  The patient is a 30 y.o. female  who presents with pain that started about 1 week ago. She was brought by EMS. Her pain was a 10 out of 10 and she couldn't stand up. She was vomiting. SHe has diarrhea frequently. She has chronic diarrhea. She denies any fever but has had sweats. She has no weight loss or gain. She is having sharp stabbing pain that travels to back and epigatrium. The patient was counseled at length about the risks of aleksandar Covid-19 during their perioperative period and any recovery window from their procedure. The patient was made aware that aleksandar Covid-19  may worsen their prognosis for recovering from their procedure  and lend to a higher morbidity and/or mortality risk. All material risks, benefits, and reasonable alternatives including postponing the procedure were discussed. The patient does wish to proceed with the procedure at this time. Past Medical History:        Diagnosis Date    Anxiety     Asthma     Depression     Headache     IBS (irritable bowel syndrome)        Past Surgical History:        Procedure Laterality Date    TONGUE SURGERY      WISDOM TOOTH EXTRACTION         Medications Prior to Admission:   Not in a hospital admission.     Allergies:  Gentamicin and Hydrocodone    Social History:   Social History     Socioeconomic History    Marital status: Single     Spouse name: Not on file    Number of children: 0    Years of education: Not on file    Highest education level: High school graduate   Occupational History    Occupation: cook   Social Needs    Financial resource strain: Not on file    Food insecurity     Worry: Not on file     Inability: Not on file    Transportation needs     Medical: Not on file 06/22/2020    RBC 4.72 06/22/2020    HGB 14.1 06/22/2020    HCT 42.7 06/22/2020     06/22/2020    MCV 90.4 06/22/2020    MCH 29.8 06/22/2020    MCHC 33.0 06/22/2020    RDW 14.4 06/22/2020    LYMPHOPCT 9 06/22/2020    MONOPCT 3 06/22/2020    BASOPCT 0 06/22/2020    MONOSABS 0.47 06/22/2020    LYMPHSABS 1.40 06/22/2020    EOSABS 0.16 06/22/2020    BASOSABS 0.00 06/22/2020    DIFFTYPE NOT REPORTED 06/22/2020     CMP:    Lab Results   Component Value Date     06/22/2020    K 3.8 06/22/2020     06/22/2020    CO2 24 06/22/2020    BUN 14 06/22/2020    CREATININE 0.86 06/22/2020    GFRAA >60 06/22/2020    LABGLOM >60 06/22/2020    GLUCOSE 124 06/22/2020    PROT 7.4 06/22/2020    LABALBU 4.4 06/22/2020    CALCIUM 9.3 06/22/2020    BILITOT 0.22 06/22/2020    ALKPHOS 67 06/22/2020    AST 16 06/22/2020    ALT 19 06/22/2020     LIPASE:    Lab Results   Component Value Date    LIPASE 33 06/22/2020       Pertinent Radiology:  Gayathri Ehgigi [[id number]]    Collected: 06/22/20 0929    Updated: 06/22/20 0935    Narrative:     EXAMINATION:  RIGHT UPPER QUADRANT ULTRASOUND    6/22/2020 9:28 am      FINDINGS:  LIVER:  The liver demonstrates normal echogenicity without evidence of  intrahepatic biliary ductal dilatation.  Flow in the portal vein is  hepatopetal.  Liver length is 15.5 cm.  By ultrasound no focal liver masses  are seen. BILIARY SYSTEM: Glorietta Beto are several small shadowing gallstones.  Wall  thickness just above the upper limits of normal measuring 3.3 mm.  There is  no pericholecystic fluid.  The patient did not have a positive sonographic  Mcgraw's sign. Common bile duct is within normal limits measuring 3.3 mm.    RIGHT KIDNEY: The right kidney is grossly unremarkable without evidence of  hydronephrosis. PANCREAS:  Visualized portions of the pancreas are unremarkable. OTHER: No evidence of right upper quadrant ascites.    Impression:     Cholelithiasis with borderline gallbladder wall thickening but no other  sonographic findings of cholecystitis.  HIDA scan could assess for acute  cholecystitis if clinically warranted. CT ABDOMEN PELVIS WO CONTRAST Additional Contrast? None [[id number]]    Collected: 06/22/20 0848    Updated: 06/22/20 0858    Narrative:     EXAMINATION:  CT OF THE ABDOMEN AND PELVIS WITHOUT CONTRAST 6/22/2020 8:32 am    FINDINGS:  Lower Chest: Lung bases are clear. Organs: Unenhanced liver, spleen, pancreas, adrenal glands are unremarkable. Cholelithiasis with questionable mild thickening of the gallbladder wall.  No  hydronephrosis or renal calcification. GI/Bowel: No evidence of bowel obstruction or free intraperitoneal air. Small to moderate volume of stool in the colon.  No evidence of appendiceal  inflammation. Pelvis: Bladder is unremarkable.  Left adnexal 6.4 x 4.7 cm cyst is probably  associated with the ovary.  There is a small amount of free fluid in the  pelvis. Peritoneum/Retroperitoneum: Abdominal aortic caliber is normal.  No  retroperitoneal adenopathy. Bones/Soft Tissues: No aggressive lytic or blastic bony lesion.  Small fat  containing umbilical hernia. Impression:     1. Cholelithiasis with questionable mild thickening of the gallbladder wall. Gallbladder would better be assessed with ultrasound. 2. Left adnexal 6.4 x 4.7 cm cyst, probably associated with the ovary. Recommend ultrasound for further evaluation.  If patient has no associated  symptoms, ultrasound can be done non emergently on an outpatient basis. 3. Small amount of free fluid in the pelvis. ASSESSMENT   Principal Problem:    Acute cholecystitis  Active Problems:    IBS (irritable bowel syndrome)    Depression    Asthma    Anxiety    Class 2 obesity due to excess calories without serious comorbidity in adult    Tobacco abuse    Marijuana abuse  Resolved Problems:    * No resolved hospital problems. *      PLAN    1.  Lap du possible open possible

## 2020-06-22 NOTE — ED PROVIDER NOTES
[address] ED  eMERGENCY dEPARTMENT eNCOUnter    Pt Name: Enrrique Luis  MRN: [id number]  YOB: 1995  Date of evaluation:6/22/20  PCP: No primary care provider on file. CHIEF COMPLAINT       Chief Complaint   Patient presents with    Abdominal Pain    Flank Pain    Back Pain    Emesis       HISTORY OF PRESENT ILLNESS    Enrrique Luis is a 30 y.o. female who presents with a chief complaint of right flank pain. Patient states pain started several hours ago. It woke her from sleep. She reports nausea and several episodes of emesis. Originally thought it was gas pains but she has passed gas and the pain did not go away. No dysuria, hematuria, vaginal bleeding or discharge. No fevers that she knows of. States she had several episodes like this over the past week however not nearly as severe and they resolve spontaneously. Pain is 10 out of 10 in severity. Romel Jacquelyn does not radiate. Nothing makes symptoms better or worse. Patient has no other complaints at this time. REVIEW OF SYSTEMS       Review of Systems   Constitutional: Negative for chills, fatigue and fever. HENT: Negative for congestion, ear pain, sore throat and trouble swallowing. Eyes: Negative for visual disturbance. Respiratory: Negative for cough and shortness of breath. Cardiovascular: Negative for chest pain, palpitations and leg swelling. Gastrointestinal: Positive for abdominal pain, nausea and vomiting. Negative for blood in stool, constipation and diarrhea. Genitourinary: Positive for flank pain. Negative for dysuria. Musculoskeletal: Negative for arthralgias, back pain, myalgias and neck pain. Skin: Negative for color change, rash and wound. Neurological: Negative for dizziness, weakness, light-headedness, numbness and headaches. Psychiatric/Behavioral: Negative for confusion. All other systems reviewed and are negative. Negativein 10 essential Systems except as mentioned above and in the HPI.

## 2020-06-22 NOTE — ED NOTES
Report given to Jo Ann Sy RN from Med Surg. Report method by phone   The following was reviewed with receiving RN:   Current vital signs:  /69   Pulse 70   Temp 98.4 °F (36.9 °C) (Oral)   Resp 16   Ht 5' 2\" (1.575 m)   Wt 210 lb (95.3 kg)   SpO2 98%   BMI 38.41 kg/m²                MEWS Score: 1     Any medication or safety alerts were reviewed. Any pending diagnostics and notifications were also reviewed, as well as any safety concerns or issues, abnormal labs, abnormal imaging, and abnormal assessment findings. Questions were answered.           Adeola Mukherjee RN  06/22/20 2513

## 2020-06-22 NOTE — OP NOTE
Operative Note      Patient: Edd Thomas  YOB: 1995  MRN: [id number]    Date of Procedure: 6/22/2020    Pre-Op Diagnosis: ACUTE CHOLECYSTITIS    Post-Op Diagnosis: Same       Procedure(s):  CHOLECYSTECTOMY LAPAROSCOPIC    Surgeon(s):  Shelbie Arizmendi MD    Assistant: none    Anesthesia: General    Estimated Blood Loss (mL): Minimal    Complications: None    Specimens:   ID Type Source Tests Collected by Time Destination   A : gallbladder Tissue Gallbladder SURGICAL PATHOLOGY Shelbie Arizmendi MD 6/22/2020 1550        Drains:   Closed/Suction Drain Midline Abdomen Bulb 10 Polish (Active)     HISTORY: The patient is a 30y.o. year old female with history of above preop diagnosis. I explained the risk, benefits, expected outcome, and alternatives to the procedure. Patient understands and is in agreement. PROCEDURE: The patient was given general anesthesia. The abdomen was prepped and draped in typical sterile fashion. The base of the umbilicus is grasped with a kocher and elevated. A longitudinal incision is made with an #11blade. The base of the umbilicus is grasped with a kocker and 0 Vicryl suture is placed on either side of the fascia. An incision is made in the fascia with a #11 blade and the peritoneum is entered bluntly. The  Johns canula was inserted and secured into place with the sutures. Pneumoperitoneum was established with CO2 gas with maximum pressure of 15 mm Hg. Under direct vision, a 5 mm bladed trocar was inserted under direct vision in the Right flank and the right mid axillary line after anesthetizing with 0.5% marcaine. The gallbladder was grasped at the fundus with grasper through the right lateral canula and at the infundibulum with a grasper through the right mid canula. The neck of the gallbladder was retracted laterally. An 5mm  epigastric port was place under direct vision after anesthetizing with 0.5% marcaine.   There was omentum adherent to the gallbladder and this

## 2020-06-23 VITALS
HEIGHT: 62 IN | DIASTOLIC BLOOD PRESSURE: 66 MMHG | RESPIRATION RATE: 18 BRPM | OXYGEN SATURATION: 97 % | SYSTOLIC BLOOD PRESSURE: 107 MMHG | HEART RATE: 65 BPM | WEIGHT: 218.7 LBS | BODY MASS INDEX: 40.25 KG/M2 | TEMPERATURE: 97.7 F

## 2020-06-23 PROBLEM — K81.0 ACUTE CHOLECYSTITIS: Status: RESOLVED | Noted: 2020-06-22 | Resolved: 2020-06-23

## 2020-06-23 PROBLEM — Z90.49 S/P LAPAROSCOPIC CHOLECYSTECTOMY: Chronic | Status: ACTIVE | Noted: 2020-06-23

## 2020-06-23 LAB
ANION GAP SERPL CALCULATED.3IONS-SCNC: 8 MMOL/L (ref 9–17)
BUN BLDV-MCNC: 11 MG/DL (ref 6–20)
BUN/CREAT BLD: ABNORMAL (ref 9–20)
CALCIUM SERPL-MCNC: 8.2 MG/DL (ref 8.6–10.4)
CHLORIDE BLD-SCNC: 108 MMOL/L (ref 98–107)
CO2: 23 MMOL/L (ref 20–31)
CREAT SERPL-MCNC: 0.66 MG/DL (ref 0.5–0.9)
GFR AFRICAN AMERICAN: >60 ML/MIN
GFR NON-AFRICAN AMERICAN: >60 ML/MIN
GFR SERPL CREATININE-BSD FRML MDRD: ABNORMAL ML/MIN/{1.73_M2}
GFR SERPL CREATININE-BSD FRML MDRD: ABNORMAL ML/MIN/{1.73_M2}
GLUCOSE BLD-MCNC: 125 MG/DL (ref 70–99)
HCT VFR BLD CALC: 37 % (ref 36–46)
HEMOGLOBIN: 11.9 G/DL (ref 12–16)
MCH RBC QN AUTO: 29.3 PG (ref 26–34)
MCHC RBC AUTO-ENTMCNC: 32.1 G/DL (ref 31–37)
MCV RBC AUTO: 91.1 FL (ref 80–100)
NRBC AUTOMATED: ABNORMAL PER 100 WBC
PDW BLD-RTO: 14.3 % (ref 11.5–14.9)
PLATELET # BLD: 227 K/UL (ref 150–450)
PMV BLD AUTO: 8 FL (ref 6–12)
POTASSIUM SERPL-SCNC: 4.8 MMOL/L (ref 3.7–5.3)
RBC # BLD: 4.06 M/UL (ref 4–5.2)
SODIUM BLD-SCNC: 139 MMOL/L (ref 135–144)
WBC # BLD: 15.1 K/UL (ref 3.5–11)

## 2020-06-23 PROCEDURE — 97530 THERAPEUTIC ACTIVITIES: CPT

## 2020-06-23 PROCEDURE — 97162 PT EVAL MOD COMPLEX 30 MIN: CPT

## 2020-06-23 PROCEDURE — 6360000002 HC RX W HCPCS: Performed by: SURGERY

## 2020-06-23 PROCEDURE — 6370000000 HC RX 637 (ALT 250 FOR IP): Performed by: SURGERY

## 2020-06-23 PROCEDURE — 80048 BASIC METABOLIC PNL TOTAL CA: CPT

## 2020-06-23 PROCEDURE — 85027 COMPLETE CBC AUTOMATED: CPT

## 2020-06-23 PROCEDURE — 97116 GAIT TRAINING THERAPY: CPT

## 2020-06-23 PROCEDURE — 36415 COLL VENOUS BLD VENIPUNCTURE: CPT

## 2020-06-23 RX ORDER — OXYCODONE HYDROCHLORIDE AND ACETAMINOPHEN 5; 325 MG/1; MG/1
2 TABLET ORAL EVERY 4 HOURS PRN
Status: DISCONTINUED | OUTPATIENT
Start: 2020-06-23 | End: 2020-06-23 | Stop reason: HOSPADM

## 2020-06-23 RX ORDER — ONDANSETRON 4 MG/1
4 TABLET, FILM COATED ORAL 3 TIMES DAILY PRN
Qty: 15 TABLET | Refills: 0 | Status: SHIPPED | OUTPATIENT
Start: 2020-06-23

## 2020-06-23 RX ORDER — IBUPROFEN 800 MG/1
800 TABLET ORAL EVERY 8 HOURS PRN
Qty: 30 TABLET | Refills: 0 | Status: SHIPPED | OUTPATIENT
Start: 2020-06-23 | End: 2021-02-28

## 2020-06-23 RX ORDER — OXYCODONE HYDROCHLORIDE AND ACETAMINOPHEN 5; 325 MG/1; MG/1
1 TABLET ORAL EVERY 4 HOURS PRN
Status: DISCONTINUED | OUTPATIENT
Start: 2020-06-23 | End: 2020-06-23 | Stop reason: HOSPADM

## 2020-06-23 RX ORDER — KETOROLAC TROMETHAMINE 30 MG/ML
30 INJECTION, SOLUTION INTRAMUSCULAR; INTRAVENOUS EVERY 6 HOURS PRN
Status: DISCONTINUED | OUTPATIENT
Start: 2020-06-23 | End: 2020-06-23 | Stop reason: HOSPADM

## 2020-06-23 RX ORDER — DOCUSATE SODIUM 100 MG/1
100 CAPSULE, LIQUID FILLED ORAL 2 TIMES DAILY
Qty: 30 CAPSULE | Refills: 0 | Status: SHIPPED | OUTPATIENT
Start: 2020-06-23 | End: 2021-02-28

## 2020-06-23 RX ORDER — OXYCODONE HYDROCHLORIDE AND ACETAMINOPHEN 5; 325 MG/1; MG/1
1 TABLET ORAL EVERY 6 HOURS PRN
Qty: 28 TABLET | Refills: 0 | Status: SHIPPED | OUTPATIENT
Start: 2020-06-23 | End: 2020-06-30

## 2020-06-23 RX ADMIN — HYDROMORPHONE HYDROCHLORIDE 0.5 MG: 1 INJECTION, SOLUTION INTRAMUSCULAR; INTRAVENOUS; SUBCUTANEOUS at 07:19

## 2020-06-23 RX ADMIN — PROMETHAZINE HYDROCHLORIDE 12.5 MG: 25 TABLET ORAL at 07:19

## 2020-06-23 RX ADMIN — PROMETHAZINE HYDROCHLORIDE 12.5 MG: 25 TABLET ORAL at 04:33

## 2020-06-23 RX ADMIN — OXYCODONE HYDROCHLORIDE AND ACETAMINOPHEN 2 TABLET: 5; 325 TABLET ORAL at 14:48

## 2020-06-23 RX ADMIN — OXYCODONE HYDROCHLORIDE AND ACETAMINOPHEN 2 TABLET: 5; 325 TABLET ORAL at 10:45

## 2020-06-23 RX ADMIN — FAMOTIDINE 20 MG: 20 TABLET ORAL at 10:45

## 2020-06-23 RX ADMIN — TRAMADOL HYDROCHLORIDE 50 MG: 50 TABLET, FILM COATED ORAL at 07:19

## 2020-06-23 RX ADMIN — KETOROLAC TROMETHAMINE 30 MG: 30 INJECTION, SOLUTION INTRAMUSCULAR at 17:19

## 2020-06-23 RX ADMIN — HYDROMORPHONE HYDROCHLORIDE 0.5 MG: 1 INJECTION, SOLUTION INTRAMUSCULAR; INTRAVENOUS; SUBCUTANEOUS at 04:33

## 2020-06-23 RX ADMIN — ENOXAPARIN SODIUM 40 MG: 40 INJECTION SUBCUTANEOUS at 07:18

## 2020-06-23 RX ADMIN — KETOROLAC TROMETHAMINE 30 MG: 30 INJECTION, SOLUTION INTRAMUSCULAR at 10:45

## 2020-06-23 RX ADMIN — HYDROMORPHONE HYDROCHLORIDE 0.5 MG: 1 INJECTION, SOLUTION INTRAMUSCULAR; INTRAVENOUS; SUBCUTANEOUS at 01:33

## 2020-06-23 ASSESSMENT — PAIN SCALES - GENERAL
PAINLEVEL_OUTOF10: 7
PAINLEVEL_OUTOF10: 6
PAINLEVEL_OUTOF10: 0
PAINLEVEL_OUTOF10: 0
PAINLEVEL_OUTOF10: 7
PAINLEVEL_OUTOF10: 7
PAINLEVEL_OUTOF10: 0
PAINLEVEL_OUTOF10: 7
PAINLEVEL_OUTOF10: 6
PAINLEVEL_OUTOF10: 0
PAINLEVEL_OUTOF10: 8
PAINLEVEL_OUTOF10: 9
PAINLEVEL_OUTOF10: 10

## 2020-06-23 ASSESSMENT — PAIN DESCRIPTION - LOCATION
LOCATION: ABDOMEN

## 2020-06-23 ASSESSMENT — PAIN DESCRIPTION - PAIN TYPE
TYPE: SURGICAL PAIN
TYPE: ACUTE PAIN
TYPE: SURGICAL PAIN

## 2020-06-23 ASSESSMENT — PAIN DESCRIPTION - FREQUENCY: FREQUENCY: CONTINUOUS

## 2020-06-23 ASSESSMENT — PAIN DESCRIPTION - PROGRESSION: CLINICAL_PROGRESSION: GRADUALLY IMPROVING

## 2020-06-23 ASSESSMENT — PAIN DESCRIPTION - ORIENTATION: ORIENTATION: RIGHT;LOWER

## 2020-06-23 ASSESSMENT — PAIN DESCRIPTION - DESCRIPTORS: DESCRIPTORS: CONSTANT;SHARP

## 2020-06-23 NOTE — DISCHARGE INSTR - ACTIVITY
As tolerated    Do not drive while on percocet    Do not lift anything heavier than  a gallon of milk    Ok to take a shower    Change dressing around NATALY insertion site daily    Empty NATALY drain every 8-12 hrs. And document outputs  For your  's appt. In one week.

## 2020-06-23 NOTE — CARE COORDINATION
CASE MANAGEMENT NOTE:    Admission Date:  6/22/2020 Kash Del Angel is a 30 y.o.  female    Admitted for : Acute cholecystitis [K81.0]    Met with:  Patient    PCP:  PCP [facility]: [address] has come to see       Current Residence/ Living Arrangements:  independently at home             Current Services PTA:  No    Is patient agreeable to VNS: Yes- if doctor Raúl Chance wants her to stated she will pay OOP- do not anticipate this need    Freedom of choice provided:  Yes    List of [address] provided: NA    VNS chosen:  NA    DME:  none    Home Oxygen: No    Nebulizer: No    CPAP/BIPAP: No    Supplier: N/A    Potential Assistance Needed: Will follow    SNF needed: No    Freedom of choice and list provided: NA    Pharmacy:  [facility]        Does Patient want to use MEDS to BEDS? Yes    Is the Patient an [facility] with Readmission Risk Score greater than 14%? No  If yes, pt needs a follow up appointment made within 7 days. Family Members/Caregivers that pt would like involved in their care:    Yes    If yes, list name here:  Franny    Transportation Provider:  Family             Is patient in Isolation/One on One/Altered Mental Status? No  If yes, skip next question. If no, would they like an I-Pad to  use? No  If yes, call [phone]. Discharge Plan:  6/23/2020 SELF PAY (she stated [facility] has seen her); Independent from apartment with flight of steps with significant other; DME- None; Agreeable to VNS if Dr Raúl Chance recommends and agreeable to pay OOP for this if needed- do not anticipate this need; Wants Meds 2 Beds; POD #1 Lap Malinda//ROBER                [facility] for this patient (not including pain mediations). [address] [facility] aware. Electronically signed by:  Arthur Mendoza RN on 6/23/2020 at 11:24 AM
Father  Still living? No  Family history of MI (myocardial infarction), Age at diagnosis: 31-40

## 2020-06-23 NOTE — DISCHARGE SUMMARY
Surgery Discharge Summary     Patient Identification  Joann Serra is a 30 y.o. female. :  1995  Admit Date:  2020    Discharge date:   No discharge date for patient encounter. Disposition: home    Discharge Diagnoses:   Patient Active Problem List   Diagnosis    IBS (irritable bowel syndrome)    Headache    Depression    Asthma    Anxiety    Class 2 obesity due to excess calories without serious comorbidity in adult    Tobacco abuse    Marijuana abuse    S/P laparoscopic cholecystectomy         Consults: general surgery    Surgery: 2020    Patient Instructions: Activity: no heavy lifting, pushing, pulling for 6 weeks, no driving for 2 weeks or while on analgesics  Diet: As tolerated  Follow-up with 1 in 2 weeks. See pre-printed instructions in chart and given to patient upon discharge. Discharge Medications:      Chasidy Macdonald 93 Medication Instructions XCT:[id number]    Printed on:20 3266   Medication Information                      albuterol sulfate  (90 Base) MCG/ACT inhaler  Inhale 2 puffs into the lungs every 4 hours as needed for Wheezing             docusate sodium (COLACE) 100 MG capsule  Take 1 capsule by mouth 2 times daily             ibuprofen (ADVIL;MOTRIN) 800 MG tablet  Take 1 tablet by mouth every 8 hours as needed for Pain or Fever             ondansetron (ZOFRAN) 4 MG tablet  Take 1 tablet by mouth 3 times daily as needed for Nausea or Vomiting             oxyCODONE-acetaminophen (PERCOCET) 5-325 MG per tablet  Take 1 tablet by mouth every 6 hours as needed for Pain for up to 7 days. Intended supply: 7 days. Take lowest dose possible to manage pain                  HPI and Hospital Course: Her post op course was uneventful and she was a little slow to get up and move around. She was discharged home in stable condition to follow up in 1 week.         Electronically signed by Katiuska Son MD on 2020 at

## 2020-06-23 NOTE — FLOWSHEET NOTE
SC visit with patient and her father;     06/23/20 1438   Encounter Summary   Services provided to: Patient and family together   Referral/Consult From: [facility]   Support System Parent   Continue Visiting   (6/23/20)   Complexity of Encounter Moderate   Length of Encounter 15 minutes   Spiritual Assessment Completed Yes   Spiritual/Confucianism   Type Spiritual support   Assessment Approachable; Anxious; Hopeful;Coping;Helplessness   Intervention Active listening;Prayer;Sustaining presence/ Ministry of presence; Discussed illness/injury and it's impact   Outcome Expressed gratitude;Engaged in conversation;Expressed feelings/needs/concerns;Coping; Hopeful;Receptive

## 2020-06-23 NOTE — PROGRESS NOTES
CLINICAL PHARMACY NOTE: MEDS TO [address] Select Patient?: No  Total # of Prescriptions Filled: 4   The following medications were delivered to the patient:  · Ibuprofen  · Zofran  · Dok   · Percocet  ·   Total # of Interventions Completed: 0  Time Spent (min): 30    Additional Documentation:
No complaints at this time  Resp.  Easy  @  18/min
Pt arrives to MED Cm, per wheel chair from ER  Pt.  Is alert and  O x4  No c/o of pain , SOB, or nausea at this time
Pt eats breakfast
Pt rests peacefully
Pt up walking in hallway, tolerates well
Marijuana abuse           Plan  1. GI prophylaxis proton pump inhibitor per orders, pharmacologic prophylaxis (with any of the following: enoxaparin (Lovenox) 40mg SQ 2h prior to surgery then QD) and intermittent pneumatic compression boots  2. normal diet as tolerated  3. Encourage ambulation  4. Switch to percocet from tramadol  5. Add toradol and abdominal binder  6.  Will try and discharge later today    Electronically signed by Kedar Dodd MD  on 6/23/2020 at 11:52 AM

## 2020-06-23 NOTE — PLAN OF CARE
Problem: Pain:  Goal: Pain level will decrease  Description: Pain level will decrease  Outcome: Ongoing  Note: Full pain assessment performed including intensity, characteristics, location, etc. Pain controlled with PRN medication and increased ambulation. Will continue to monitor. Goal: Control of acute pain  Description: Control of acute pain  Outcome: Ongoing  Goal: Control of chronic pain  Description: Control of chronic pain  Outcome: Ongoing     Problem: Preoperative Routine:  Goal: Risk for injury before, during, or after surgery or procedure will decrease  Description: Risk for injury before, during, or after surgery or procedure will decrease  Outcome: Ongoing     Problem: Anxiety:  Goal: Ability to modify response to factors that promote anxiety will improve  Description: Ability to modify response to factors that promote anxiety will improve  Outcome: Ongoing  Goal: Level of anxiety will decrease  Description: Level of anxiety will decrease  Outcome: Ongoing     Note: Discussed with pt physical and emotional concerns. Discussed with pt plan of care. Will continue to monitor.

## 2020-06-24 LAB — SURGICAL PATHOLOGY REPORT: NORMAL

## 2020-09-29 ENCOUNTER — HOSPITAL ENCOUNTER (EMERGENCY)
Age: 25
Discharge: HOME | End: 2020-09-29
Attending: EMERGENCY MEDICINE
Payer: COMMERCIAL

## 2020-09-29 VITALS
HEART RATE: 89 BPM | DIASTOLIC BLOOD PRESSURE: 69 MMHG | HEIGHT: 62 IN | OXYGEN SATURATION: 99 % | BODY MASS INDEX: 40.12 KG/M2 | RESPIRATION RATE: 18 BRPM | SYSTOLIC BLOOD PRESSURE: 120 MMHG | WEIGHT: 218 LBS | TEMPERATURE: 98.1 F

## 2020-09-29 PROCEDURE — 99283 EMERGENCY DEPT VISIT LOW MDM: CPT

## 2020-09-29 PROCEDURE — 99284 EMERGENCY DEPT VISIT MOD MDM: CPT

## 2020-09-29 PROCEDURE — 6360000002 HC RX W HCPCS: Performed by: STUDENT IN AN ORGANIZED HEALTH CARE EDUCATION/TRAINING PROGRAM

## 2020-09-29 PROCEDURE — 90471 IMMUNIZATION ADMIN: CPT | Performed by: STUDENT IN AN ORGANIZED HEALTH CARE EDUCATION/TRAINING PROGRAM

## 2020-09-29 PROCEDURE — 90715 TDAP VACCINE 7 YRS/> IM: CPT | Performed by: STUDENT IN AN ORGANIZED HEALTH CARE EDUCATION/TRAINING PROGRAM

## 2020-09-29 PROCEDURE — 6370000000 HC RX 637 (ALT 250 FOR IP): Performed by: STUDENT IN AN ORGANIZED HEALTH CARE EDUCATION/TRAINING PROGRAM

## 2020-09-29 RX ORDER — CEPHALEXIN 250 MG/1
500 CAPSULE ORAL ONCE
Status: COMPLETED | OUTPATIENT
Start: 2020-09-29 | End: 2020-09-29

## 2020-09-29 RX ORDER — SULFAMETHOXAZOLE AND TRIMETHOPRIM 800; 160 MG/1; MG/1
1 TABLET ORAL ONCE
Status: COMPLETED | OUTPATIENT
Start: 2020-09-29 | End: 2020-09-29

## 2020-09-29 RX ORDER — CEPHALEXIN 500 MG/1
500 CAPSULE ORAL 4 TIMES DAILY
Qty: 28 CAPSULE | Refills: 0 | Status: SHIPPED | OUTPATIENT
Start: 2020-09-29 | End: 2020-10-06

## 2020-09-29 RX ORDER — SULFAMETHOXAZOLE AND TRIMETHOPRIM 800; 160 MG/1; MG/1
1 TABLET ORAL 2 TIMES DAILY
Qty: 20 TABLET | Refills: 0 | Status: SHIPPED | OUTPATIENT
Start: 2020-09-29 | End: 2020-10-09

## 2020-09-29 RX ADMIN — CEPHALEXIN 500 MG: 250 CAPSULE ORAL at 16:51

## 2020-09-29 RX ADMIN — TETANUS TOXOID, REDUCED DIPHTHERIA TOXOID AND ACELLULAR PERTUSSIS VACCINE, ADSORBED 0.5 ML: 5; 2.5; 8; 8; 2.5 SUSPENSION INTRAMUSCULAR at 16:51

## 2020-09-29 RX ADMIN — SULFAMETHOXAZOLE AND TRIMETHOPRIM 1 TABLET: 800; 160 TABLET ORAL at 16:51

## 2020-09-29 ASSESSMENT — ENCOUNTER SYMPTOMS
ABDOMINAL PAIN: 0
SHORTNESS OF BREATH: 0
NAUSEA: 0
DIARRHEA: 0
CHEST TIGHTNESS: 0
VOMITING: 0
VOICE CHANGE: 0
TROUBLE SWALLOWING: 0
CONSTIPATION: 0
COUGH: 0
COLOR CHANGE: 0
WHEEZING: 0
BACK PAIN: 0

## 2020-09-29 ASSESSMENT — PAIN SCALES - GENERAL: PAINLEVEL_OUTOF10: 8

## 2020-09-29 NOTE — ED PROVIDER NOTES
Lifestyle    Physical activity     Days per week: Not on file     Minutes per session: Not on file    Stress: Not on file   Relationships    Social connections     Talks on phone: Not on file     Gets together: Not on file     Attends [employer] service: Not on file     Active member of club or organization: Not on file     Attends meetings of clubs or organizations: Not on file     Relationship status: Not on file    Intimate partner violence     Fear of current or ex partner: Not on file     Emotionally abused: Not on file     Physically abused: Not on file     Forced sexual activity: Not on file   Other Topics Concern    Not on file   Social History Narrative    Lives with fiance       Family History   Problem Relation Age of Onset    Diabetes Father     Coronary Art Dis Maternal Grandmother     Colon Cancer Maternal Grandfather 30        Allergies:  Gentamicin and Hydrocodone    Home Medications:  Prior to Admission medications    Medication Sig Start Date End Date Taking?  Authorizing Provider   cephALEXin (KEFLEX) 500 MG capsule Take 1 capsule by mouth 4 times daily for 7 days 9/29/20 10/6/20 Yes Eric Lopez, DO   sulfamethoxazole-trimethoprim (BACTRIM DS) 800-160 MG per tablet Take 1 tablet by mouth 2 times daily for 10 days 9/29/20 10/9/20 Yes Greg Lopez,    ondansetron (ZOFRAN) 4 MG tablet Take 1 tablet by mouth 3 times daily as needed for Nausea or Vomiting 6/23/20   Kal Hdz MD   ibuprofen (ADVIL;MOTRIN) 800 MG tablet Take 1 tablet by mouth every 8 hours as needed for Pain or Fever 6/23/20 7/3/20  Kal Hdz MD   docusate sodium (COLACE) 100 MG capsule Take 1 capsule by mouth 2 times daily 6/23/20   Kal Hdz MD   albuterol sulfate  (90 Base) MCG/ACT inhaler Inhale 2 puffs into the lungs every 4 hours as needed for Wheezing 5/12/20 5/19/20  Sugey Wright MD       REVIEW OFSYSTEMS    (2-9 systems for level 4, 10 or more for level 5)      Review of Systems Constitutional: Negative for chills, diaphoresis, fatigue and fever. HENT: Negative for congestion, trouble swallowing and voice change. Bump on L anterolateral neck   Respiratory: Negative for cough, chest tightness, shortness of breath and wheezing. Cardiovascular: Negative for chest pain, palpitations and leg swelling. Gastrointestinal: Negative for abdominal pain, constipation, diarrhea, nausea and vomiting. Endocrine: Negative for polydipsia, polyphagia and polyuria. Genitourinary: Negative for difficulty urinating, dysuria and urgency. Musculoskeletal: Negative for arthralgias, back pain and neck stiffness. Skin: Negative for color change, pallor and rash. Neurological: Negative for dizziness, weakness, light-headedness and headaches. PHYSICAL EXAM   (up to 7 for level 4, 8 or more forlevel 5)      INITIAL VITALS:   ED Triage Vitals [09/29/20 1632]   BP Temp Temp Source Pulse Resp SpO2 Height Weight   120/69 98.1 °F (36.7 °C) Oral 89 18 99 % 5' 2\" (1.575 m) 218 lb (98.9 kg)       Physical Exam  Vitals signs and nursing note reviewed. Constitutional:       General: She is not in acute distress. Appearance: She is well-developed. She is not diaphoretic. HENT:      Head: Normocephalic and atraumatic. Mouth/Throat:      Mouth: Mucous membranes are moist.      Pharynx: Oropharynx is clear. No oropharyngeal exudate. Eyes:      General: No scleral icterus. Conjunctiva/sclera: Conjunctivae normal.      Pupils: Pupils are equal, round, and reactive to light. Neck:      Musculoskeletal: Normal range of motion and neck supple. Vascular: No JVD. Trachea: No tracheal deviation. Cardiovascular:      Rate and Rhythm: Normal rate and regular rhythm. Heart sounds: Normal heart sounds. No murmur. No friction rub. Pulmonary:      Effort: Pulmonary effort is normal. No respiratory distress. Breath sounds: Normal breath sounds. No wheezing.    Chest: Chest wall: No tenderness. Abdominal:      General: Bowel sounds are normal. There is no distension. Palpations: Abdomen is soft. Tenderness: There is no abdominal tenderness. There is no guarding. Skin:     General: Skin is warm and dry. Capillary Refill: Capillary refill takes less than 2 seconds. Coloration: Skin is not pale. Findings: Lesion (L anterolateral neck small raised area of erythema with central black spot. ) present. No erythema. Neurological:      General: No focal deficit present. Mental Status: She is alert and oriented to person, place, and time. Mental status is at baseline. Cranial Nerves: No cranial nerve deficit. Sensory: No sensory deficit. Psychiatric:         Mood and Affect: Mood normal.         Behavior: Behavior normal.         DIFFERENTIAL  DIAGNOSIS     PLAN (LABS / IMAGING / EKG):  No orders of the defined types were placed in this encounter.       MEDICATIONS ORDERED:  Orders Placed This Encounter   Medications    cephALEXin (KEFLEX) capsule 500 mg     Order Specific Question:   Antimicrobial Indications     Answer:   Skin and Soft Tissue Infection     Order Specific Question:   Skin duration of therapy     Answer:   7 days    sulfamethoxazole-trimethoprim (BACTRIM DS;SEPTRA DS) 800-160 MG per tablet 1 tablet     Order Specific Question:   Antimicrobial Indications     Answer:   Skin and Soft Tissue Infection     Order Specific Question:   Skin duration of therapy     Answer:   7 days    Tetanus-Diphth-Acell Pertussis (BOOSTRIX) injection 0.5 mL    cephALEXin (KEFLEX) 500 MG capsule     Sig: Take 1 capsule by mouth 4 times daily for 7 days     Dispense:  28 capsule     Refill:  0    sulfamethoxazole-trimethoprim (BACTRIM DS) 800-160 MG per tablet     Sig: Take 1 tablet by mouth 2 times daily for 10 days     Dispense:  20 tablet     Refill:  0       DDX: Abscess cellulitis, bug bite    Initial MDM/Plan: 30 y.o. female who presents with right anterior lateral neck swelling, approximately 1/2 cm x 1/2 cm. There is a central area of eschar, possibly a bite kirt. No fluctuance. There is surrounding erythema concerning for cellulitis. Bedside ultrasound showed no drainable abscess. Will discharge with Keflex, Bactrim for MRSA coverage. We will also update Tdap as last tetanus was in 2014. DIAGNOSTIC RESULTS / EMERGENCYDEPARTMENT COURSE / MDM     LABS:  Labs Reviewed - No data to display      RADIOLOGY:  No results found. EMERGENCY DEPARTMENT COURSE:    Patient seen evaluate. Will treat with Keflex and Bactrim, and update Tdap. Patient comfortable discharge plan and follow-up with her primary care provider. PROCEDURES:  None    CONSULTS:  None    CRITICAL CARE:  Please see attending note    FINAL IMPRESSION      1.  Cellulitis of neck          DISPOSITION / PLAN     DISPOSITION        PATIENT REFERRED TO:  [facility] ED  [facility] [address] [phone]  Go to   If symptoms worsen      DISCHARGE MEDICATIONS:  New Prescriptions    CEPHALEXIN (KEFLEX) 500 MG CAPSULE    Take 1 capsule by mouth 4 times daily for 7 days    SULFAMETHOXAZOLE-TRIMETHOPRIM (BACTRIM DS) 800-160 MG PER TABLET    Take 1 tablet by mouth 2 times daily for 10 days       Ana Gutierres DO  Emergency Medicine Resident    (Please note that portions of this note were completed with a voice recognition program.Efforts were made to edit the dictations but occasionally words are mis-transcribed.)     Ana Gutierres DO  Resident  09/29/20 [id number]

## 2020-09-30 NOTE — ED PROVIDER NOTES
[address] ED  eMERGENCY dEPARTMENT eNCOUnter   Attending Attestation     Pt Name: Radha Hunter  MRN: [id number]  [place] 1995  Date of evaluation: 9/2920    History, EXAM, MDM:    Radha Hunter is a 30 y.o. female who presents with Abscess (neck)  SSTI on r. Neck. No sign of abscess. No sign of harjinder angina. Foliculitis vs a mild cellulitis. Starting course of abx. Vitals:   Vitals:    09/29/20 1632   BP: 120/69   Pulse: 89   Resp: 18   Temp: 98.1 °F (36.7 °C)   TempSrc: Oral   SpO2: 99%   Weight: 218 lb (98.9 kg)   Height: 5' 2\" (1.575 m)     I performed a history and physical examination of the patient and discussed management with the resident. I reviewed the residents note and agree with the documented findings and plan of care. Any areas of disagreement are noted on the chart. I was personally present for the key portions of any procedures. I have documented in the chart those procedures where I was not present during the key portions. I have personally reviewed all images and agree with the resident's interpretation. I have reviewed the emergency nurses triage note. I agree with the chief complaint, past medical history, past surgical history, allergies, medications, social and family history as documented unless otherwise noted below. Documentation of the HPI, Physical Exam and Medical Decision Making performed by medical students or scribes is based on my personal performance of the HPI, PE and MDM. For Phys Assistant/ Nurse Practitioner cases/documentation I have had a face to face evaluation of this patient and have completed at least one if not all key elements of the E/M (history, physical exam, and MDM). Additional findings are as noted.     Rosalva Marie MD  Attending Emergency  Physician                Rosalva Marie MD  09/30/20 [id number]

## 2020-10-01 ENCOUNTER — HOSPITAL ENCOUNTER (EMERGENCY)
Age: 25
Discharge: HOME | End: 2020-10-01
Attending: STUDENT IN AN ORGANIZED HEALTH CARE EDUCATION/TRAINING PROGRAM
Payer: COMMERCIAL

## 2020-10-01 VITALS
DIASTOLIC BLOOD PRESSURE: 67 MMHG | TEMPERATURE: 97.8 F | WEIGHT: 218 LBS | SYSTOLIC BLOOD PRESSURE: 122 MMHG | HEART RATE: 73 BPM | RESPIRATION RATE: 16 BRPM | OXYGEN SATURATION: 100 % | HEIGHT: 62 IN | BODY MASS INDEX: 40.12 KG/M2

## 2020-10-01 LAB
-: ABNORMAL
ABSOLUTE EOS #: 0.1 K/UL (ref 0–0.4)
ABSOLUTE IMMATURE GRANULOCYTE: ABNORMAL K/UL (ref 0–0.3)
ABSOLUTE LYMPH #: 2 K/UL (ref 1–4.8)
ABSOLUTE MONO #: 0.4 K/UL (ref 0.1–1.3)
ALBUMIN SERPL-MCNC: 4 G/DL (ref 3.5–5.2)
ALBUMIN/GLOBULIN RATIO: ABNORMAL (ref 1–2.5)
ALP BLD-CCNC: 74 U/L (ref 35–104)
ALT SERPL-CCNC: 22 U/L (ref 5–33)
AMORPHOUS: ABNORMAL
ANION GAP SERPL CALCULATED.3IONS-SCNC: 11 MMOL/L (ref 9–17)
AST SERPL-CCNC: 23 U/L
BACTERIA: ABNORMAL
BASOPHILS # BLD: 0 % (ref 0–2)
BASOPHILS ABSOLUTE: 0 K/UL (ref 0–0.2)
BILIRUB SERPL-MCNC: 0.37 MG/DL (ref 0.3–1.2)
BILIRUBIN URINE: NEGATIVE
BUN BLDV-MCNC: 16 MG/DL (ref 6–20)
BUN/CREAT BLD: ABNORMAL (ref 9–20)
CALCIUM SERPL-MCNC: 8.5 MG/DL (ref 8.6–10.4)
CASTS UA: ABNORMAL /LPF
CHLORIDE BLD-SCNC: 103 MMOL/L (ref 98–107)
CO2: 23 MMOL/L (ref 20–31)
COLOR: YELLOW
COMMENT UA: ABNORMAL
CREAT SERPL-MCNC: 1.09 MG/DL (ref 0.5–0.9)
CRYSTALS, UA: ABNORMAL /HPF
DIFFERENTIAL TYPE: ABNORMAL
EOSINOPHILS RELATIVE PERCENT: 1 % (ref 0–4)
EPITHELIAL CELLS UA: ABNORMAL /HPF
GFR AFRICAN AMERICAN: >60 ML/MIN
GFR NON-AFRICAN AMERICAN: >60 ML/MIN
GFR SERPL CREATININE-BSD FRML MDRD: ABNORMAL ML/MIN/{1.73_M2}
GFR SERPL CREATININE-BSD FRML MDRD: ABNORMAL ML/MIN/{1.73_M2}
GLUCOSE BLD-MCNC: 83 MG/DL (ref 70–99)
GLUCOSE URINE: NEGATIVE
HCG(URINE) PREGNANCY TEST: NEGATIVE
HCT VFR BLD CALC: 38.6 % (ref 36–46)
HEMOGLOBIN: 13.2 G/DL (ref 12–16)
IMMATURE GRANULOCYTES: ABNORMAL %
KETONES, URINE: ABNORMAL
LEUKOCYTE ESTERASE, URINE: NEGATIVE
LYMPHOCYTES # BLD: 23 % (ref 24–44)
MCH RBC QN AUTO: 31.1 PG (ref 26–34)
MCHC RBC AUTO-ENTMCNC: 34.1 G/DL (ref 31–37)
MCV RBC AUTO: 91.1 FL (ref 80–100)
MONOCYTES # BLD: 4 % (ref 1–7)
MUCUS: ABNORMAL
NITRITE, URINE: NEGATIVE
NRBC AUTOMATED: ABNORMAL PER 100 WBC
OTHER OBSERVATIONS UA: ABNORMAL
PDW BLD-RTO: 13.9 % (ref 11.5–14.9)
PH UA: 5.5 (ref 5–8)
PLATELET # BLD: 265 K/UL (ref 150–450)
PLATELET ESTIMATE: ABNORMAL
PMV BLD AUTO: 7.7 FL (ref 6–12)
POTASSIUM SERPL-SCNC: 4.2 MMOL/L (ref 3.7–5.3)
PROTEIN UA: ABNORMAL
RBC # BLD: 4.24 M/UL (ref 4–5.2)
RBC # BLD: ABNORMAL 10*6/UL
RBC UA: ABNORMAL /HPF
RENAL EPITHELIAL, UA: ABNORMAL /HPF
SEG NEUTROPHILS: 72 % (ref 36–66)
SEGMENTED NEUTROPHILS ABSOLUTE COUNT: 6.3 K/UL (ref 1.3–9.1)
SODIUM BLD-SCNC: 137 MMOL/L (ref 135–144)
SPECIFIC GRAVITY UA: 1.05 (ref 1–1.03)
TOTAL PROTEIN: 6.9 G/DL (ref 6.4–8.3)
TRICHOMONAS: ABNORMAL
TURBIDITY: ABNORMAL
URINE HGB: ABNORMAL
UROBILINOGEN, URINE: NORMAL
WBC # BLD: 8.7 K/UL (ref 3.5–11)
WBC # BLD: ABNORMAL 10*3/UL
WBC UA: ABNORMAL /HPF
YEAST: ABNORMAL

## 2020-10-01 PROCEDURE — 99284 EMERGENCY DEPT VISIT MOD MDM: CPT

## 2020-10-01 PROCEDURE — 36415 COLL VENOUS BLD VENIPUNCTURE: CPT

## 2020-10-01 PROCEDURE — 96374 THER/PROPH/DIAG INJ IV PUSH: CPT

## 2020-10-01 PROCEDURE — 80053 COMPREHEN METABOLIC PANEL: CPT

## 2020-10-01 PROCEDURE — 81025 URINE PREGNANCY TEST: CPT

## 2020-10-01 PROCEDURE — 85025 COMPLETE CBC W/AUTO DIFF WBC: CPT

## 2020-10-01 PROCEDURE — 2500000003 HC RX 250 WO HCPCS: Performed by: STUDENT IN AN ORGANIZED HEALTH CARE EDUCATION/TRAINING PROGRAM

## 2020-10-01 PROCEDURE — 6360000002 HC RX W HCPCS: Performed by: STUDENT IN AN ORGANIZED HEALTH CARE EDUCATION/TRAINING PROGRAM

## 2020-10-01 PROCEDURE — 96375 TX/PRO/DX INJ NEW DRUG ADDON: CPT

## 2020-10-01 PROCEDURE — 81001 URINALYSIS AUTO W/SCOPE: CPT

## 2020-10-01 PROCEDURE — 2580000003 HC RX 258: Performed by: STUDENT IN AN ORGANIZED HEALTH CARE EDUCATION/TRAINING PROGRAM

## 2020-10-01 RX ORDER — 0.9 % SODIUM CHLORIDE 0.9 %
1000 INTRAVENOUS SOLUTION INTRAVENOUS ONCE
Status: COMPLETED | OUTPATIENT
Start: 2020-10-01 | End: 2020-10-01

## 2020-10-01 RX ORDER — ONDANSETRON 2 MG/ML
INJECTION INTRAMUSCULAR; INTRAVENOUS
Status: DISCONTINUED
Start: 2020-10-01 | End: 2020-10-01 | Stop reason: HOSPADM

## 2020-10-01 RX ORDER — ONDANSETRON 2 MG/ML
4 INJECTION INTRAMUSCULAR; INTRAVENOUS ONCE
Status: COMPLETED | OUTPATIENT
Start: 2020-10-01 | End: 2020-10-01

## 2020-10-01 RX ADMIN — ONDANSETRON 4 MG: 2 INJECTION INTRAMUSCULAR; INTRAVENOUS at 10:16

## 2020-10-01 RX ADMIN — FAMOTIDINE 20 MG: 10 INJECTION INTRAVENOUS at 10:17

## 2020-10-01 RX ADMIN — SODIUM CHLORIDE 1000 ML: 9 INJECTION, SOLUTION INTRAVENOUS at 10:16

## 2020-10-01 ASSESSMENT — ENCOUNTER SYMPTOMS
PHOTOPHOBIA: 0
DIARRHEA: 1
ABDOMINAL PAIN: 0
VOMITING: 0
COLOR CHANGE: 0
FACIAL SWELLING: 0
COUGH: 0
SHORTNESS OF BREATH: 0
RHINORRHEA: 0
NAUSEA: 1
EYE ITCHING: 0

## 2020-10-01 ASSESSMENT — PAIN SCALES - GENERAL: PAINLEVEL_OUTOF10: 7

## 2020-10-01 ASSESSMENT — PAIN DESCRIPTION - DESCRIPTORS: DESCRIPTORS: SHARP;BURNING

## 2020-10-01 ASSESSMENT — PAIN DESCRIPTION - LOCATION: LOCATION: ABDOMEN

## 2020-10-01 ASSESSMENT — PAIN DESCRIPTION - ORIENTATION: ORIENTATION: RIGHT

## 2020-10-01 NOTE — ED PROVIDER NOTES
EMERGENCY DEPARTMENT ENCOUNTER    Pt Name: Michael Messina  MRN: [id number]  [place] 1995  Date of evaluation: 10/1/20  CHIEF COMPLAINT       Chief Complaint   Patient presents with    Abdominal Pain     been ongoing since gallbladder out 3 months ago    Diarrhea     started this AM    Nausea     Started this AM     HISTORY OF PRESENT ILLNESS   HPI  49-year-old female presents with chief complaint of diarrhea nausea. Symptoms started this morning. Patient describes 5 episodes of nonbloody nonmucoid stools. Nausea with no vomiting. Does describe baseline incisional type abdominal pain which has been going on since patient had her gallbladder taken out in June. No fever chills today. The patient is taking Keflex and Bactrim for cellulitis around her face. That has been doing well and improving. Last seen in the emergency department 2 days ago for this. Her symptoms today are moderate and intermittent. REVIEW OF SYSTEMS     Review of Systems   Constitutional: Negative for chills and fatigue. HENT: Negative for facial swelling, postnasal drip and rhinorrhea. Eyes: Negative for photophobia and itching. Respiratory: Negative for cough and shortness of breath. Cardiovascular: Negative for chest pain and leg swelling. Gastrointestinal: Positive for diarrhea and nausea. Negative for abdominal pain and vomiting. Genitourinary: Negative for dysuria, flank pain and hematuria. Musculoskeletal: Negative for arthralgias and joint swelling. Skin: Negative for color change and rash. Neurological: Negative for dizziness, numbness and headaches.      PASTMEDICAL HISTORY     Past Medical History:   Diagnosis Date    Anxiety     Asthma     Depression     Headache     IBS (irritable bowel syndrome)      Past Problem List  Patient Active Problem List   Diagnosis Code    IBS (irritable bowel syndrome) K58.9    Headache R51.9    Depression F32.9    Asthma J45.909    Anxiety F41.9    Class 2 obesity due to excess calories without serious comorbidity in adult E66.09    Tobacco abuse Z72.0    Marijuana abuse F12.10    S/P laparoscopic cholecystectomy Z90.49     SURGICAL HISTORY       Past Surgical History:   Procedure Laterality Date    CHOLECYSTECTOMY, LAPAROSCOPIC N/A 6/22/2020    CHOLECYSTECTOMY LAPAROSCOPIC performed by Jacqueline Nieto MD at [facility]       Discharge Medication List as of 10/1/2020 11:21 AM      CONTINUE these medications which have NOT CHANGED    Details   cephALEXin (KEFLEX) 500 MG capsule Take 1 capsule by mouth 4 times daily for 7 days, Disp-28 capsule,R-0Print      sulfamethoxazole-trimethoprim (BACTRIM DS) 800-160 MG per tablet Take 1 tablet by mouth 2 times daily for 10 days, Disp-20 tablet,R-0Print      ibuprofen (ADVIL;MOTRIN) 800 MG tablet Take 1 tablet by mouth every 8 hours as needed for Pain or Fever, Disp-30 tablet,R-0Normal      albuterol sulfate  (90 Base) MCG/ACT inhaler Inhale 2 puffs into the lungs every 4 hours as needed for Wheezing, Disp-1 Inhaler,R-0Print      ondansetron (ZOFRAN) 4 MG tablet Take 1 tablet by mouth 3 times daily as needed for Nausea or Vomiting, Disp-15 tablet, R-0Normal      docusate sodium (COLACE) 100 MG capsule Take 1 capsule by mouth 2 times daily, Disp-30 capsule, R-0Normal           ALLERGIES     is allergic to gentamicin and hydrocodone. FAMILY HISTORY     She indicated that the status of her father is unknown. She indicated that the status of her maternal grandmother is unknown. She indicated that the status of her maternal grandfather is unknown.      SOCIAL HISTORY       Social History     Tobacco Use    Smoking status: Current Every Day Smoker     Packs/day: 1.00     Years: 10.00     Pack years: 10.00     Types: Cigarettes     Start date: 2006    Smokeless tobacco: Never Used   Substance Use Topics    Alcohol use: Never     Frequency: Never    Drug use: Not Currently     Types: Marijuana     PHYSICAL EXAM     INITIAL VITALS: /67   Pulse 73   Temp 97.8 °F (36.6 °C) (Oral)   Resp 16   Ht 5' 2\" (1.575 m)   Wt 218 lb (98.9 kg)   LMP 2020 (Exact Date)   SpO2 100%   BMI 39.87 kg/m²    Physical Exam  Constitutional:       Appearance: She is normal weight. HENT:      Head: Normocephalic and atraumatic. Eyes:      Extraocular Movements: Extraocular movements intact. Pupils: Pupils are equal, round, and reactive to light. Neck:      Musculoskeletal: Normal range of motion and neck supple. Cardiovascular:      Rate and Rhythm: Normal rate and regular rhythm. Pulmonary:      Effort: Pulmonary effort is normal.      Breath sounds: Normal breath sounds. Abdominal:      General: Abdomen is flat. There is no distension. Palpations: There is no mass. Comments: Soft nondistended abdomen well-healed laparoscopy scars   Musculoskeletal: Normal range of motion. General: No swelling. Skin:     General: Skin is warm and dry. Neurological:      General: No focal deficit present. Mental Status: She is alert. Mental status is at baseline. MEDICAL DECISION MAKIN-year-old female overall well-appearing presents with some diarrhea and nausea. Patient was hemodynamically stable and afebrile. Basic labs were consistent with some mild dehydration. Patient's creatinine was mildly elevated at 1.09 and her urine specific gravity is elevated at 1.047. On reassessment after some IV fluids and antiemetics the patient was symptomatically improved. She had no significant leukocytosis. Her nominal exam was benign. Did attempt to obtain stool studies unsuccessfully while she was here given recent antibiotic use will wait for C. difficile. Overall she is clinically stable and we will discharge home with outpatient primary care follow-up for any continued symptoms.          CRITICAL CARE:

## 2020-10-08 ENCOUNTER — HOSPITAL ENCOUNTER (EMERGENCY)
Age: 25
Discharge: HOME | End: 2020-10-08
Attending: EMERGENCY MEDICINE
Payer: COMMERCIAL

## 2020-10-08 ENCOUNTER — APPOINTMENT (OUTPATIENT)
Dept: CT IMAGING | Age: 25
End: 2020-10-08
Payer: COMMERCIAL

## 2020-10-08 VITALS
DIASTOLIC BLOOD PRESSURE: 66 MMHG | SYSTOLIC BLOOD PRESSURE: 131 MMHG | BODY MASS INDEX: 38.09 KG/M2 | HEIGHT: 62 IN | HEART RATE: 98 BPM | RESPIRATION RATE: 16 BRPM | WEIGHT: 207 LBS | TEMPERATURE: 99.4 F | OXYGEN SATURATION: 97 %

## 2020-10-08 LAB
ABSOLUTE EOS #: 0.2 K/UL (ref 0–0.4)
ABSOLUTE IMMATURE GRANULOCYTE: ABNORMAL K/UL (ref 0–0.3)
ABSOLUTE LYMPH #: 3.5 K/UL (ref 1–4.8)
ABSOLUTE MONO #: 0.6 K/UL (ref 0.1–1.3)
ALBUMIN SERPL-MCNC: 4 G/DL (ref 3.5–5.2)
ALBUMIN/GLOBULIN RATIO: ABNORMAL (ref 1–2.5)
ALP BLD-CCNC: 64 U/L (ref 35–104)
ALT SERPL-CCNC: 14 U/L (ref 5–33)
ANION GAP SERPL CALCULATED.3IONS-SCNC: 13 MMOL/L (ref 9–17)
AST SERPL-CCNC: 15 U/L
BASOPHILS # BLD: 0 % (ref 0–2)
BASOPHILS ABSOLUTE: 0 K/UL (ref 0–0.2)
BILIRUB SERPL-MCNC: 0.18 MG/DL (ref 0.3–1.2)
BUN BLDV-MCNC: 16 MG/DL (ref 6–20)
BUN/CREAT BLD: ABNORMAL (ref 9–20)
C-REACTIVE PROTEIN: 1.5 MG/L (ref 0–5)
CALCIUM SERPL-MCNC: 8.5 MG/DL (ref 8.6–10.4)
CHLORIDE BLD-SCNC: 103 MMOL/L (ref 98–107)
CO2: 20 MMOL/L (ref 20–31)
CREAT SERPL-MCNC: 1.02 MG/DL (ref 0.5–0.9)
DIFFERENTIAL TYPE: ABNORMAL
EOSINOPHILS RELATIVE PERCENT: 1 % (ref 0–4)
GFR AFRICAN AMERICAN: >60 ML/MIN
GFR NON-AFRICAN AMERICAN: >60 ML/MIN
GFR SERPL CREATININE-BSD FRML MDRD: ABNORMAL ML/MIN/{1.73_M2}
GFR SERPL CREATININE-BSD FRML MDRD: ABNORMAL ML/MIN/{1.73_M2}
GLUCOSE BLD-MCNC: 91 MG/DL (ref 70–99)
HCG QUALITATIVE: NEGATIVE
HCT VFR BLD CALC: 40.2 % (ref 36–46)
HEMOGLOBIN: 13.4 G/DL (ref 12–16)
IMMATURE GRANULOCYTES: ABNORMAL %
LACTATE DEHYDROGENASE: 173 U/L (ref 135–214)
LIPASE: 26 U/L (ref 13–60)
LYMPHOCYTES # BLD: 31 % (ref 24–44)
MCH RBC QN AUTO: 30.5 PG (ref 26–34)
MCHC RBC AUTO-ENTMCNC: 33.4 G/DL (ref 31–37)
MCV RBC AUTO: 91.4 FL (ref 80–100)
MONOCYTES # BLD: 5 % (ref 1–7)
NRBC AUTOMATED: ABNORMAL PER 100 WBC
PDW BLD-RTO: 13.6 % (ref 11.5–14.9)
PLATELET # BLD: 316 K/UL (ref 150–450)
PLATELET ESTIMATE: ABNORMAL
PMV BLD AUTO: 7.6 FL (ref 6–12)
POTASSIUM SERPL-SCNC: 3.9 MMOL/L (ref 3.7–5.3)
RBC # BLD: 4.4 M/UL (ref 4–5.2)
RBC # BLD: ABNORMAL 10*6/UL
SEG NEUTROPHILS: 63 % (ref 36–66)
SEGMENTED NEUTROPHILS ABSOLUTE COUNT: 6.9 K/UL (ref 1.3–9.1)
SODIUM BLD-SCNC: 136 MMOL/L (ref 135–144)
TOTAL PROTEIN: 6.9 G/DL (ref 6.4–8.3)
WBC # BLD: 11.1 K/UL (ref 3.5–11)
WBC # BLD: ABNORMAL 10*3/UL

## 2020-10-08 PROCEDURE — 83690 ASSAY OF LIPASE: CPT

## 2020-10-08 PROCEDURE — 2580000003 HC RX 258: Performed by: EMERGENCY MEDICINE

## 2020-10-08 PROCEDURE — 6360000002 HC RX W HCPCS: Performed by: EMERGENCY MEDICINE

## 2020-10-08 PROCEDURE — 6360000004 HC RX CONTRAST MEDICATION: Performed by: EMERGENCY MEDICINE

## 2020-10-08 PROCEDURE — 36415 COLL VENOUS BLD VENIPUNCTURE: CPT

## 2020-10-08 PROCEDURE — 96374 THER/PROPH/DIAG INJ IV PUSH: CPT

## 2020-10-08 PROCEDURE — 84703 CHORIONIC GONADOTROPIN ASSAY: CPT

## 2020-10-08 PROCEDURE — 80053 COMPREHEN METABOLIC PANEL: CPT

## 2020-10-08 PROCEDURE — 85025 COMPLETE CBC W/AUTO DIFF WBC: CPT

## 2020-10-08 PROCEDURE — 74177 CT ABD & PELVIS W/CONTRAST: CPT

## 2020-10-08 PROCEDURE — 86140 C-REACTIVE PROTEIN: CPT

## 2020-10-08 PROCEDURE — 83615 LACTATE (LD) (LDH) ENZYME: CPT

## 2020-10-08 PROCEDURE — 99284 EMERGENCY DEPT VISIT MOD MDM: CPT

## 2020-10-08 RX ORDER — 0.9 % SODIUM CHLORIDE 0.9 %
80 INTRAVENOUS SOLUTION INTRAVENOUS ONCE
Status: DISCONTINUED | OUTPATIENT
Start: 2020-10-08 | End: 2020-10-08

## 2020-10-08 RX ORDER — SODIUM CHLORIDE 0.9 % (FLUSH) 0.9 %
10 SYRINGE (ML) INJECTION PRN
Status: DISCONTINUED | OUTPATIENT
Start: 2020-10-08 | End: 2020-10-08

## 2020-10-08 RX ORDER — 0.9 % SODIUM CHLORIDE 0.9 %
80 INTRAVENOUS SOLUTION INTRAVENOUS ONCE
Status: COMPLETED | OUTPATIENT
Start: 2020-10-08 | End: 2020-10-08

## 2020-10-08 RX ORDER — ONDANSETRON 2 MG/ML
4 INJECTION INTRAMUSCULAR; INTRAVENOUS ONCE
Status: COMPLETED | OUTPATIENT
Start: 2020-10-08 | End: 2020-10-08

## 2020-10-08 RX ORDER — DICYCLOMINE HYDROCHLORIDE 10 MG/1
10 CAPSULE ORAL EVERY 6 HOURS PRN
Qty: 20 CAPSULE | Refills: 0 | Status: SHIPPED | OUTPATIENT
Start: 2020-10-08

## 2020-10-08 RX ORDER — 0.9 % SODIUM CHLORIDE 0.9 %
1000 INTRAVENOUS SOLUTION INTRAVENOUS ONCE
Status: COMPLETED | OUTPATIENT
Start: 2020-10-08 | End: 2020-10-08

## 2020-10-08 RX ORDER — SODIUM CHLORIDE 0.9 % (FLUSH) 0.9 %
10 SYRINGE (ML) INJECTION PRN
Status: DISCONTINUED | OUTPATIENT
Start: 2020-10-08 | End: 2020-10-09 | Stop reason: HOSPADM

## 2020-10-08 RX ADMIN — SODIUM CHLORIDE 80 ML: 9 INJECTION, SOLUTION INTRAVENOUS at 21:13

## 2020-10-08 RX ADMIN — IOVERSOL 75 ML: 741 INJECTION INTRA-ARTERIAL; INTRAVENOUS at 21:13

## 2020-10-08 RX ADMIN — SODIUM CHLORIDE 1000 ML: 9 INJECTION, SOLUTION INTRAVENOUS at 19:55

## 2020-10-08 RX ADMIN — Medication 10 ML: at 21:13

## 2020-10-08 RX ADMIN — ONDANSETRON 4 MG: 2 INJECTION INTRAMUSCULAR; INTRAVENOUS at 19:55

## 2020-10-08 ASSESSMENT — ENCOUNTER SYMPTOMS
VOMITING: 0
ABDOMINAL PAIN: 0
SHORTNESS OF BREATH: 0
CONSTIPATION: 0
BACK PAIN: 0
TROUBLE SWALLOWING: 0
SORE THROAT: 0
BLOOD IN STOOL: 0
DIARRHEA: 1
COLOR CHANGE: 0
COUGH: 0
NAUSEA: 1

## 2020-10-08 NOTE — LETTER
[address] ED  [address] [facility] [address]  Phone: [phone]               October 8, 2020    Patient: Kitty Aguayo   YOB: 1995   Date of Visit: 10/8/2020       To Whom It May Concern:    Kitty Aguayo was seen and treated in our emergency department on 10/8/2020. She may return to school on 10/09/2020.       Sincerely,       PAMELA        Signature:__________________________________

## 2020-10-08 NOTE — ED PROVIDER NOTES
[address] ED  eMERGENCY dEPARTMENT eNCOUnter    Pt Name: Helen Royal  MRN: [id number]  YOB: 1995  Date of evaluation:10/8/20  PCP: No primary care provider on file. CHIEF COMPLAINT       Chief Complaint   Patient presents with    Diarrhea    Fatigue    Nausea       HISTORY OF PRESENT ILLNESS    Helen Royal is a 30 y.o. female who presents with a chief complaint of diarrhea and nausea. Patient states her symptoms have been going on for the past several months but worse over the last week. She states that she is not having any blood in her diarrhea. She is not having any vomiting. States her diarrhea is really been going on ever since she had her gallbladder removed in June. Does not follow with her surgeon anymore about this. No other abdominal surgeries in the past.  She denies any abdominal pain to me. No fevers or chills. No chest pain, cough or difficulty breathing. Symptoms are acute on chronic. Symptoms are moderate. None make symptoms better or worse. Patient has no other complaints at this time. REVIEW OF SYSTEMS       Review of Systems   Constitutional: Negative for chills, fatigue and fever. HENT: Negative for congestion, ear pain, sore throat and trouble swallowing. Eyes: Negative for visual disturbance. Respiratory: Negative for cough and shortness of breath. Cardiovascular: Negative for chest pain, palpitations and leg swelling. Gastrointestinal: Positive for diarrhea and nausea. Negative for abdominal pain, blood in stool, constipation and vomiting. Genitourinary: Negative for dysuria and flank pain. Musculoskeletal: Negative for arthralgias, back pain, myalgias and neck pain. Skin: Negative for color change, rash and wound. Neurological: Negative for dizziness, weakness, light-headedness, numbness and headaches. Psychiatric/Behavioral: Negative for confusion. All other systems reviewed and are negative.     Negativein 10 essential Systems except as mentioned above and in the HPI. PAST MEDICAL HISTORY     Past Medical History:   Diagnosis Date    Anxiety     Asthma     Depression     Headache     IBS (irritable bowel syndrome)          SURGICAL HISTORY      has a past surgical history that includes [place] tooth extraction; Tongue surgery; and Cholecystectomy, laparoscopic (N/A, 6/22/2020). CURRENT MEDICATIONS       Previous Medications    ALBUTEROL SULFATE  (90 BASE) MCG/ACT INHALER    Inhale 2 puffs into the lungs every 4 hours as needed for Wheezing    DOCUSATE SODIUM (COLACE) 100 MG CAPSULE    Take 1 capsule by mouth 2 times daily    IBUPROFEN (ADVIL;MOTRIN) 800 MG TABLET    Take 1 tablet by mouth every 8 hours as needed for Pain or Fever    ONDANSETRON (ZOFRAN) 4 MG TABLET    Take 1 tablet by mouth 3 times daily as needed for Nausea or Vomiting    SULFAMETHOXAZOLE-TRIMETHOPRIM (BACTRIM DS) 800-160 MG PER TABLET    Take 1 tablet by mouth 2 times daily for 10 days       ALLERGIES     is allergic to gentamicin and hydrocodone. FAMILY HISTORY     She indicated that the status of her father is unknown. She indicated that the status of her maternal grandmother is unknown. She indicated that the status of her maternal grandfather is unknown.     family history includes Colon Cancer (age of onset: 27) in her maternal grandfather; Coronary Art Dis in her maternal grandmother; Diabetes in her father. SOCIAL HISTORY      reports that she has been smoking cigarettes. She started smoking about 14 years ago. She has a 10.00 pack-year smoking history. She has never used smokeless tobacco. She reports previous drug use. Drug: Marijuana. She reports that she does not drink alcohol. PHYSICAL EXAM     INITIAL VITALS:  height is 5' 2\" (1.575 m) and weight is 207 lb (93.9 kg). Her oral temperature is 99.4 °F (37.4 °C). Her blood pressure is 131/66 and her pulse is 98. Her respiration is 16 and oxygen saturation is 97%.      Physical Exam  Vitals signs and nursing note reviewed. Constitutional:       General: She is not in acute distress. HENT:      Head: Normocephalic and atraumatic. Eyes:      Conjunctiva/sclera: Conjunctivae normal.      Pupils: Pupils are equal, round, and reactive to light. Neck:      Musculoskeletal: Neck supple. Cardiovascular:      Rate and Rhythm: Normal rate and regular rhythm. Heart sounds: Normal heart sounds. No murmur. Pulmonary:      Effort: Pulmonary effort is normal. No respiratory distress. Breath sounds: Normal breath sounds. Abdominal:      General: Bowel sounds are normal. There is no distension. Palpations: Abdomen is soft. Tenderness: There is abdominal tenderness (Right lower quadrant). Musculoskeletal:         General: No tenderness. Lymphadenopathy:      Cervical: No cervical adenopathy. Skin:     General: Skin is warm and dry. Findings: No rash. Neurological:      Mental Status: She is alert and oriented to person, place, and time. Psychiatric:         Judgment: Judgment normal.           DIFFERENTIAL DIAGNOSIS/MDM:   73-year-old female presents with several months of diarrhea that is been worse over the past several weeks. She is afebrile, nontoxic, normal vital signs here. No acute distress. On my exam she does have focal tenderness in her right lower quadrant. It is only with deep palpation. I doubt ovarian torsion. I doubt bowel obstruction perforation. Appendicitis is a possibility. We will get lab work, CT abdomen pelvis. Will give IV fluids, antiemetics.     DIAGNOSTIC RESULTS     EKG: All EKG's are interpreted by the Emergency Department Physician who either signs or Co-signs this chart in the absence of a cardiologist.        RADIOLOGY:   I directly visualized the following  images and reviewed the radiologist interpretations:  CT ABDOMEN PELVIS W IV CONTRAST Additional Contrast? None   Final Result   No acute abdominal or pelvic

## 2020-10-20 ENCOUNTER — HOSPITAL ENCOUNTER (OUTPATIENT)
Age: 25
Discharge: HOME | End: 2020-10-20
Payer: COMMERCIAL

## 2020-10-20 LAB
ABSOLUTE EOS #: 0.2 K/UL (ref 0–0.4)
ABSOLUTE IMMATURE GRANULOCYTE: ABNORMAL K/UL (ref 0–0.3)
ABSOLUTE LYMPH #: 2.7 K/UL (ref 1–4.8)
ABSOLUTE MONO #: 0.5 K/UL (ref 0.1–1.3)
ALBUMIN SERPL-MCNC: 4.4 G/DL (ref 3.5–5.2)
ALBUMIN/GLOBULIN RATIO: ABNORMAL (ref 1–2.5)
ALP BLD-CCNC: 77 U/L (ref 35–104)
ALT SERPL-CCNC: 18 U/L (ref 5–33)
ANION GAP SERPL CALCULATED.3IONS-SCNC: 13 MMOL/L (ref 9–17)
AST SERPL-CCNC: 18 U/L
BASOPHILS # BLD: 1 % (ref 0–2)
BASOPHILS ABSOLUTE: 0.1 K/UL (ref 0–0.2)
BILIRUB SERPL-MCNC: <0.15 MG/DL (ref 0.3–1.2)
BUN BLDV-MCNC: 17 MG/DL (ref 6–20)
BUN/CREAT BLD: ABNORMAL (ref 9–20)
CALCIUM SERPL-MCNC: 9 MG/DL (ref 8.6–10.4)
CHLORIDE BLD-SCNC: 101 MMOL/L (ref 98–107)
CO2: 22 MMOL/L (ref 20–31)
CREAT SERPL-MCNC: 0.82 MG/DL (ref 0.5–0.9)
DIFFERENTIAL TYPE: ABNORMAL
EOSINOPHILS RELATIVE PERCENT: 1 % (ref 0–4)
GFR AFRICAN AMERICAN: >60 ML/MIN
GFR NON-AFRICAN AMERICAN: >60 ML/MIN
GFR SERPL CREATININE-BSD FRML MDRD: ABNORMAL ML/MIN/{1.73_M2}
GFR SERPL CREATININE-BSD FRML MDRD: ABNORMAL ML/MIN/{1.73_M2}
GLUCOSE BLD-MCNC: 84 MG/DL (ref 70–99)
HCT VFR BLD CALC: 45.3 % (ref 36–46)
HEMOGLOBIN: 15 G/DL (ref 12–16)
IMMATURE GRANULOCYTES: ABNORMAL %
LYMPHOCYTES # BLD: 26 % (ref 24–44)
MCH RBC QN AUTO: 30.9 PG (ref 26–34)
MCHC RBC AUTO-ENTMCNC: 33.1 G/DL (ref 31–37)
MCV RBC AUTO: 93.5 FL (ref 80–100)
MONOCYTES # BLD: 4 % (ref 1–7)
NRBC AUTOMATED: ABNORMAL PER 100 WBC
PDW BLD-RTO: 13.7 % (ref 11.5–14.9)
PLATELET # BLD: 284 K/UL (ref 150–450)
PLATELET ESTIMATE: ABNORMAL
PMV BLD AUTO: 8 FL (ref 6–12)
POTASSIUM SERPL-SCNC: 4.8 MMOL/L (ref 3.7–5.3)
RBC # BLD: 4.85 M/UL (ref 4–5.2)
RBC # BLD: ABNORMAL 10*6/UL
SEG NEUTROPHILS: 68 % (ref 36–66)
SEGMENTED NEUTROPHILS ABSOLUTE COUNT: 7.1 K/UL (ref 1.3–9.1)
SODIUM BLD-SCNC: 136 MMOL/L (ref 135–144)
T3 FREE: 2.71 PG/ML (ref 2.02–4.43)
THYROXINE, FREE: 1.21 NG/DL (ref 0.93–1.7)
TOTAL PROTEIN: 7.6 G/DL (ref 6.4–8.3)
TSH SERPL DL<=0.05 MIU/L-ACNC: 0.74 MIU/L (ref 0.3–5)
VITAMIN D 25-HYDROXY: 20.6 NG/ML (ref 30–100)
WBC # BLD: 10.5 K/UL (ref 3.5–11)
WBC # BLD: ABNORMAL 10*3/UL

## 2020-10-20 PROCEDURE — 82306 VITAMIN D 25 HYDROXY: CPT

## 2020-10-20 PROCEDURE — 80053 COMPREHEN METABOLIC PANEL: CPT

## 2020-10-20 PROCEDURE — 84439 ASSAY OF FREE THYROXINE: CPT

## 2020-10-20 PROCEDURE — 84443 ASSAY THYROID STIM HORMONE: CPT

## 2020-10-20 PROCEDURE — 36415 COLL VENOUS BLD VENIPUNCTURE: CPT

## 2020-10-20 PROCEDURE — 84481 FREE ASSAY (FT-3): CPT

## 2020-10-20 PROCEDURE — 85025 COMPLETE CBC W/AUTO DIFF WBC: CPT

## 2020-10-23 ENCOUNTER — OFFICE VISIT (OUTPATIENT)
Dept: PRIMARY CARE CLINIC | Age: 25
End: 2020-10-23
Payer: COMMERCIAL

## 2020-10-23 ENCOUNTER — APPOINTMENT (OUTPATIENT)
Dept: GENERAL RADIOLOGY | Age: 25
End: 2020-10-23
Payer: COMMERCIAL

## 2020-10-23 ENCOUNTER — HOSPITAL ENCOUNTER (EMERGENCY)
Age: 25
Discharge: HOME | End: 2020-10-23
Attending: EMERGENCY MEDICINE
Payer: COMMERCIAL

## 2020-10-23 VITALS
HEART RATE: 76 BPM | HEIGHT: 62 IN | OXYGEN SATURATION: 98 % | TEMPERATURE: 97.8 F | BODY MASS INDEX: 37.91 KG/M2 | WEIGHT: 206 LBS

## 2020-10-23 VITALS
WEIGHT: 206.8 LBS | BODY MASS INDEX: 38.05 KG/M2 | DIASTOLIC BLOOD PRESSURE: 69 MMHG | TEMPERATURE: 98.3 F | RESPIRATION RATE: 18 BRPM | OXYGEN SATURATION: 96 % | HEART RATE: 75 BPM | SYSTOLIC BLOOD PRESSURE: 117 MMHG | HEIGHT: 62 IN

## 2020-10-23 PROCEDURE — U0003 INFECTIOUS AGENT DETECTION BY NUCLEIC ACID (DNA OR RNA); SEVERE ACUTE RESPIRATORY SYNDROME CORONAVIRUS 2 (SARS-COV-2) (CORONAVIRUS DISEASE [COVID-19]), AMPLIFIED PROBE TECHNIQUE, MAKING USE OF HIGH THROUGHPUT TECHNOLOGIES AS DESCRIBED BY CMS-2020-01-R: HCPCS

## 2020-10-23 PROCEDURE — G8417 CALC BMI ABV UP PARAM F/U: HCPCS | Performed by: FAMILY MEDICINE

## 2020-10-23 PROCEDURE — 4004F PT TOBACCO SCREEN RCVD TLK: CPT | Performed by: FAMILY MEDICINE

## 2020-10-23 PROCEDURE — 99284 EMERGENCY DEPT VISIT MOD MDM: CPT

## 2020-10-23 PROCEDURE — G8484 FLU IMMUNIZE NO ADMIN: HCPCS | Performed by: FAMILY MEDICINE

## 2020-10-23 PROCEDURE — 99203 OFFICE O/P NEW LOW 30 MIN: CPT | Performed by: FAMILY MEDICINE

## 2020-10-23 PROCEDURE — 71045 X-RAY EXAM CHEST 1 VIEW: CPT

## 2020-10-23 PROCEDURE — G8427 DOCREV CUR MEDS BY ELIG CLIN: HCPCS | Performed by: FAMILY MEDICINE

## 2020-10-23 RX ORDER — GUAIFENESIN/DEXTROMETHORPHAN 100-10MG/5
5 SYRUP ORAL 3 TIMES DAILY PRN
Qty: 120 ML | Refills: 0 | Status: SHIPPED | OUTPATIENT
Start: 2020-10-23 | End: 2020-11-02

## 2020-10-23 ASSESSMENT — ENCOUNTER SYMPTOMS
DIARRHEA: 0
SHORTNESS OF BREATH: 0
ABDOMINAL PAIN: 0
WHEEZING: 0
COUGH: 1
NAUSEA: 1
TROUBLE SWALLOWING: 0
VOMITING: 0
ABDOMINAL PAIN: 0
SORE THROAT: 1
COUGH: 1
VOMITING: 0
SORE THROAT: 1
RHINORRHEA: 0
WHEEZING: 0
SHORTNESS OF BREATH: 0

## 2020-10-23 ASSESSMENT — PAIN SCALES - GENERAL: PAINLEVEL_OUTOF10: 7

## 2020-10-23 ASSESSMENT — PAIN DESCRIPTION - LOCATION: LOCATION: ABDOMEN

## 2020-10-23 ASSESSMENT — PAIN DESCRIPTION - PAIN TYPE: TYPE: CHRONIC PAIN

## 2020-10-23 NOTE — PATIENT INSTRUCTIONS
You were tested for COVID today. We will call you with results when they are available. If you have not heard from us in 7 days, please call our office. Advance Care Planning  People with COVID-19 may have no symptoms, mild symptoms, such as fever, cough, and shortness of breath or they may have more severe illness, developing severe and fatal pneumonia. As a result, Advance Care Planning with attention to naming a health care decision maker (someone you trust to make healthcare decisions for you if you could not speak for yourself) and sharing other health care preferences is important BEFORE a possible health crisis. Please contact your Primary Care Provider to discuss Advance Care Planning. Preventing the Spread of Coronavirus Disease 2019 in Homes and Residential Communities  For the most recent information go to [email or web address]    Prevention steps for People with confirmed or suspected COVID-19 (including persons under investigation) who do not need to be hospitalized  and   People with confirmed COVID-19 who were hospitalized and determined to be medically stable to go home    Your healthcare provider and public health staff will evaluate whether you can be cared for at home. If it is determined that you do not need to be hospitalized and can be isolated at home, you will be monitored by staff from your local or state health department. You should follow the prevention steps below until a healthcare provider or local or state health department says you can return to your normal activities. Stay home except to get medical care  People who are mildly ill with COVID-19 are able to isolate at home during their illness. You should restrict activities outside your home, except for getting medical care. Do not go to work, school, or public areas. Avoid using public transportation, ride-sharing, or taxis.   Separate yourself from other people and animals in your home  People: As much as possible, you should stay in a specific room and away from other people in your home. Also, you should use a separate bathroom, if available. Animals: You should restrict contact with pets and other animals while you are sick with COVID-19, just like you would around other people. Although there have not been reports of pets or other animals becoming sick with COVID-19, it is still recommended that people sick with COVID-19 limit contact with animals until more information is known about the virus. When possible, have another member of your household care for your animals while you are sick. If you are sick with COVID-19, avoid contact with your pet, including petting, snuggling, being kissed or licked, and sharing food. If you must care for your pet or be around animals while you are sick, wash your hands before and after you interact with pets and wear a facemask. Call ahead before visiting your doctor  If you have a medical appointment, call the healthcare provider and tell them that you have or may have COVID-19. This will help the healthcare providers office take steps to keep other people from getting infected or exposed. Wear a facemask  You should wear a facemask when you are around other people (e.g., sharing a room or vehicle) or pets and before you enter a healthcare providers office. If you are not able to wear a facemask (for example, because it causes trouble breathing), then people who live with you should not stay in the same room with you, or they should wear a facemask if they enter your room. Cover your coughs and sneezes  Cover your mouth and nose with a tissue when you cough or sneeze. Throw used tissues in a lined trash can. Immediately wash your hands with soap and water for at least 20 seconds or, if soap and water are not available, clean your hands with an alcohol-based hand  that contains at least 60% alcohol.   Clean your hands often  Wash your hands often with soap and water for at least 20 seconds, especially after blowing your nose, coughing, or sneezing; going to the bathroom; and before eating or preparing food. If soap and water are not readily available, use an alcohol-based hand  with at least 60% alcohol, covering all surfaces of your hands and rubbing them together until they feel dry. Soap and water are the best option if hands are visibly dirty. Avoid touching your eyes, nose, and mouth with unwashed hands. Avoid sharing personal household items  You should not share dishes, drinking glasses, cups, eating utensils, towels, or bedding with other people or pets in your home. After using these items, they should be washed thoroughly with soap and water. Clean all high-touch surfaces everyday  High touch surfaces include counters, tabletops, doorknobs, bathroom fixtures, toilets, phones, keyboards, tablets, and bedside tables. Also, clean any surfaces that may have blood, stool, or body fluids on them. Use a household cleaning spray or wipe, according to the label instructions. Labels contain instructions for safe and effective use of the cleaning product including precautions you should take when applying the product, such as wearing gloves and making sure you have good ventilation during use of the product. Monitor your symptoms  Seek prompt medical attention if your illness is worsening (e.g., difficulty breathing). Before seeking care, call your healthcare provider and tell them that you have, or are being evaluated for, COVID-19. Put on a facemask before you enter the facility. These steps will help the healthcare providers office to keep other people in the office or waiting room from getting infected or exposed. Ask your healthcare provider to call the local or state health department.  Persons who are placed under active monitoring or facilitated self-monitoring should follow instructions provided by their local health department or occupational health professionals, as appropriate. When working with your local health department check their available hours. If you have a medical emergency and need to call 911, notify the dispatch personnel that you have, or are being evaluated for COVID-19. If possible, put on a facemask before emergency medical services arrive. Discontinuing home isolation  Patients with confirmed COVID-19 should remain under home isolation precautions until the risk of secondary transmission to others is thought to be low. The decision to discontinue home isolation precautions should be made on a case-by-case basis, in consultation with healthcare providers and state and local health departments.

## 2020-10-23 NOTE — PROGRESS NOTES
[address]  [facility]  [address] [place] [address]  Dept: [phone]  Dept Fax: [phone]    Abena Woodall is a 30 y.o. female who presents today for her medical conditions/complaintsas noted below. Abena Woodall is c/o of Covid Testing (cough, nasal drainage / congestion, fatigue x 3 days )        HPI:     Cough   This is a new problem. The current episode started in the past 7 days (3 days). The problem has been unchanged. The problem occurs constantly. The cough is non-productive. Associated symptoms include nasal congestion and a sore throat. Pertinent negatives include no chills, ear pain, fever, myalgias, rash, rhinorrhea, shortness of breath or wheezing. Associated symptoms comments: fatigue. Nothing aggravates the symptoms. Treatments tried: dayquil. The treatment provided mild relief. Her past medical history is significant for asthma. There is no history of COPD or environmental allergies. Patient was just evaluated in the ER.  CXR normal. Diagnosed with viral illness and discharged with mucinex DM script  PMHx of IBS  No known sick contacts but works at [employer] where she comes in contact with the public  Past Medical History:   Diagnosis Date    Anxiety     Asthma     Depression     Headache     IBS (irritable bowel syndrome)     Past medical history reviewed and pertinent positives/negatives in the HPI    Past Surgical History:   Procedure Laterality Date    CHOLECYSTECTOMY, LAPAROSCOPIC N/A 6/22/2020    CHOLECYSTECTOMY LAPAROSCOPIC performed by Antonette Martinez MD at [facility] WISDOM TOOTH EXTRACTION         Family History   Problem Relation Age of Onset    Diabetes Father     Coronary Art Dis Maternal Grandmother     Colon Cancer Maternal Grandfather 30       Social History     Tobacco Use    Smoking status: Current Every Day Smoker     Packs/day: 1.00     Years: 10.00     Pack years: 10.00     Types: Cigarettes Start date: 2006    Smokeless tobacco: Never Used   Substance Use Topics    Alcohol use: Never     Frequency: Never      Current Outpatient Medications   Medication Sig Dispense Refill    guaiFENesin-dextromethorphan (ROBITUSSIN DM) 100-10 MG/5ML syrup Take 5 mLs by mouth 3 times daily as needed for Cough 120 mL 0    dicyclomine (BENTYL) 10 MG capsule Take 1 capsule by mouth every 6 hours as needed (cramps) 20 capsule 0    ondansetron (ZOFRAN) 4 MG tablet Take 1 tablet by mouth 3 times daily as needed for Nausea or Vomiting 15 tablet 0    ibuprofen (ADVIL;MOTRIN) 800 MG tablet Take 1 tablet by mouth every 8 hours as needed for Pain or Fever 30 tablet 0    docusate sodium (COLACE) 100 MG capsule Take 1 capsule by mouth 2 times daily 30 capsule 0    albuterol sulfate  (90 Base) MCG/ACT inhaler Inhale 2 puffs into the lungs every 4 hours as needed for Wheezing 1 Inhaler 0     No current facility-administered medications for this visit. Allergies   Allergen Reactions    Gentamicin     Hydrocodone Nausea Only       Health Maintenance   Topic Date Due    Varicella vaccine (1 of 2 - 2-dose childhood series) 03/21/1994    Pneumococcal 0-64 years Vaccine (1 of 1 - PPSV23) 03/21/1999    HIV screen  03/21/2008    Cervical cancer screen  03/21/2014    Flu vaccine (1) 09/01/2020    DTaP/Tdap/Td vaccine (2 - Td) 09/29/2030    Hepatitis A vaccine  Aged Out    Hepatitis B vaccine  Aged Out    Hib vaccine  Aged Out    Meningococcal (ACWY) vaccine  Aged Out       :      Review of Systems   Constitutional: Negative for chills and fever. HENT: Positive for congestion and sore throat. Negative for ear pain and rhinorrhea. Respiratory: Positive for cough. Negative for shortness of breath and wheezing. Gastrointestinal: Positive for nausea (chronic). Negative for abdominal pain, diarrhea and vomiting. Musculoskeletal: Negative for myalgias. Skin: Negative for pallor and rash. Allergic/Immunologic: Negative for environmental allergies. Hematological: Negative for adenopathy. Objective:   Patient was evaluated [place] today during this pandemic covid 19 situation. Physical Exam  Vitals signs and nursing note reviewed. Constitutional:       Appearance: Normal appearance. She is obese. HENT:      Head: Normocephalic and atraumatic. Right Ear: Hearing normal.      Left Ear: Hearing normal.      Nose: Nose normal.      Mouth/Throat:      Lips: Pink. Mouth: Mucous membranes are moist.      Pharynx: Oropharynx is clear. Eyes:      Extraocular Movements: Extraocular movements intact. Conjunctiva/sclera: Conjunctivae normal.   Neck:      Musculoskeletal: Normal range of motion. Cardiovascular:      Rate and Rhythm: Normal rate and regular rhythm. Heart sounds: Normal heart sounds. Pulmonary:      Effort: Pulmonary effort is normal.      Breath sounds: Normal breath sounds. Skin:     General: Skin is warm and dry. Neurological:      Mental Status: She is alert and oriented to person, place, and time. Mental status is at baseline. Psychiatric:         Mood and Affect: Mood normal.         Behavior: Behavior normal.         Thought Content: Thought content normal.         Judgment: Judgment normal.       Pulse 76   Temp 97.8 °F (36.6 °C) (Infrared)   Ht 5' 2\" (1.575 m)   Wt 206 lb (93.4 kg)   LMP 09/29/2020 (Exact Date)   SpO2 98%   BMI 37.68 kg/m²     Assessment:       Diagnosis Orders   1. Suspected COVID-19 virus infection  COVID-19 Ambulatory   2. Viral illness         Plan: You were tested for COVID today. We will call you with results when they are available. If you have not heard from us in 7 days, please call our office. Advance Care Planning  People with COVID-19 may have no symptoms, mild symptoms, such as fever, cough, and shortness of breath or they may have more severe illness, developing severe and fatal pneumonia.   As a result, Advance Care Planning with attention to naming a health care decision maker (someone you trust to make healthcare decisions for you if you could not speak for yourself) and sharing other health care preferences is important BEFORE a possible health crisis. Please contact your Primary Care Provider to discuss Advance Care Planning. Preventing the Spread of Coronavirus Disease 2019 in Homes and Residential Communities  For the most recent information go to [email or web address]    Prevention steps for People with confirmed or suspected COVID-19 (including persons under investigation) who do not need to be hospitalized  and   People with confirmed COVID-19 who were hospitalized and determined to be medically stable to go home    Your healthcare provider and public health staff will evaluate whether you can be cared for at home. If it is determined that you do not need to be hospitalized and can be isolated at home, you will be monitored by staff from your local or state health department. You should follow the prevention steps below until a healthcare provider or local or state health department says you can return to your normal activities. Stay home except to get medical care  People who are mildly ill with COVID-19 are able to isolate at home during their illness. You should restrict activities outside your home, except for getting medical care. Do not go to work, school, or public areas. Avoid using public transportation, ride-sharing, or taxis. Separate yourself from other people and animals in your home  People: As much as possible, you should stay in a specific room and away from other people in your home. Also, you should use a separate bathroom, if available. Animals: You should restrict contact with pets and other animals while you are sick with COVID-19, just like you would around other people.  Although there have not been reports of pets or dry.  Soap and water are the best option if hands are visibly dirty. Avoid touching your eyes, nose, and mouth with unwashed hands. Avoid sharing personal household items  You should not share dishes, drinking glasses, cups, eating utensils, towels, or bedding with other people or pets in your home. After using these items, they should be washed thoroughly with soap and water. Clean all high-touch surfaces everyday  High touch surfaces include counters, tabletops, doorknobs, bathroom fixtures, toilets, phones, keyboards, tablets, and bedside tables. Also, clean any surfaces that may have blood, stool, or body fluids on them. Use a household cleaning spray or wipe, according to the label instructions. Labels contain instructions for safe and effective use of the cleaning product including precautions you should take when applying the product, such as wearing gloves and making sure you have good ventilation during use of the product. Monitor your symptoms  Seek prompt medical attention if your illness is worsening (e.g., difficulty breathing). Before seeking care, call your healthcare provider and tell them that you have, or are being evaluated for, COVID-19. Put on a facemask before you enter the facility. These steps will help the healthcare providers office to keep other people in the office or waiting room from getting infected or exposed. Ask your healthcare provider to call the local or state health department. Persons who are placed under active monitoring or facilitated self-monitoring should follow instructions provided by their local health department or occupational health professionals, as appropriate. When working with your local health department check their available hours. If you have a medical emergency and need to call 911, notify the dispatch personnel that you have, or are being evaluated for COVID-19. If possible, put on a facemask before emergency medical services arrive.   Discontinuing home isolation  Patients with confirmed COVID-19 should remain under home isolation precautions until the risk of secondary transmission to others is thought to be low. The decision to discontinue home isolation precautions should be made on a case-by-case basis, in consultation with healthcare providers and state and local health departments. Orders Placed This Encounter   Procedures    COVID-19 Ambulatory     Oropharyngeal     Standing Status:   Future     Standing Expiration Date:   10/23/2021     Scheduling Instructions:      Saline media preferred given current shortage of viral transport media but both acceptable     Order Specific Question:   Is this test for diagnosis or screening? Answer:   Diagnosis of ill patient     Order Specific Question:   Symptomatic for COVID-19 as defined by CDC? Answer:   Yes     Order Specific Question:   Date of Symptom Onset     Answer:   10/20/2020     Order Specific Question:   Hospitalized for COVID-19? Answer:   No     Order Specific Question:   Admitted to ICU for COVID-19? Answer:   No     Order Specific Question:   Employed in healthcare setting? Answer:   Unknown     Order Specific Question:   Resident in a congregate (group) care setting? Answer:   Unknown     Order Specific Question:   Pregnant? Answer:   Unknown     Order Specific Question:   Previously tested for COVID-19? Answer:   Yes     No orders of the defined types were placed in this encounter. Patient given educational materials - see patient instructions. Discussed use, benefit, and side effects of prescribed medications. All patient questions answered. Pt voiced understanding. Patient agreed with treatment plan. Follow up as directed.      Electronicallysigned by Edwige Zapata MD on 10/23/2020 at 2:50 PM

## 2020-10-23 NOTE — ED NOTES
Mode of arrival:  Walked here      Residence prior to admit: home      Chief complaint on admission: cough, nasal congestion, sore throat  Pt states that complaints started last night. Pt states that she tried salt rinses and OTC medications for symptoms. Pt states that she had some relief, yet complaints persisted. Pt states that she might have been feverish, yet has no way to check her temperature. Pt is A&Ox4, ambulates per self, and does not appear to be in any distress at this time. C= \"Have you ever felt that you should Cut down on your drinking? \"  No  A= \"Have people Annoyed you by criticizing your drinking? \"  No  G= \"Have you ever felt bad or Guilty about your drinking? \"  No  E= \"Have you ever had a drink as an Eye-opener first thing in the morning to steady your nerves or to help a hangover? \"  No      Deferred []      Reason for deferring: N/A    *If yes to two or more: probable alcohol abuse. [address] [facility], RN  10/23/20 [id number]

## 2020-10-23 NOTE — ED PROVIDER NOTES
[address]  eMERGENCY dEPARTMENT eNCOUnter      Pt Name: Soraya Rose  MRN: [id number]  [place] 1995  Date of evaluation: 10/23/2020  Provider: Tacy Severance, PA-C    CHIEF COMPLAINT       Chief Complaint   Patient presents with    Cough    Pharyngitis    Nasal Congestion           HISTORY OF PRESENT ILLNESS  (Location/Symptom, Timing/Onset, Context/Setting, Quality, Duration, Modifying Factors, Severity.)   Soraya Rose is a 30 y.o. female who presents to the emergency department with complaints of cough, sore throat, nasal congestion. Patient states cough is productive with yellow phlegm. Symptoms began 3 days ago. Patient denies fever, chills, headache, ear pain, chest pain, shortness of breath, nausea, vomiting, abdominal pain. Patient does work at a [employer]. She does have asthma. Has not been needing to use her inhalers. Pt is a smoker. She did try DayQuil yesterday. No other complaints. Nursing Notes were reviewed. REVIEW OF SYSTEMS    (2-9 systems for level 4, 10 or more for level 5)     Review of Systems   Review of Systems   Constitutional: Negative for chills, fatigue and fever. HENT: Positive for congestion, sneezing and sore throat. Negative for ear pain and trouble swallowing. Respiratory: Positive for cough. Negative for shortness of breath and wheezing. Cardiovascular: Negative for chest pain. Gastrointestinal: Negative for abdominal pain and vomiting. Neurological: Negative for headaches. Except as noted above the remainder of the review of systems was reviewed and negative.        PAST MEDICAL HISTORY     Past Medical History:   Diagnosis Date    Anxiety     Asthma     Depression     Headache     IBS (irritable bowel syndrome)      None otherwise stated in nurses notes    SURGICAL HISTORY       Past Surgical History:   Procedure Laterality Date    CHOLECYSTECTOMY, LAPAROSCOPIC N/A 6/22/2020    CHOLECYSTECTOMY LAPAROSCOPIC performed by Cassandra Gauthier MD at [facility]       None otherwise stated in nurses notes    CURRENT MEDICATIONS       Previous Medications    ALBUTEROL SULFATE  (90 BASE) MCG/ACT INHALER    Inhale 2 puffs into the lungs every 4 hours as needed for Wheezing    DICYCLOMINE (BENTYL) 10 MG CAPSULE    Take 1 capsule by mouth every 6 hours as needed (cramps)    DOCUSATE SODIUM (COLACE) 100 MG CAPSULE    Take 1 capsule by mouth 2 times daily    IBUPROFEN (ADVIL;MOTRIN) 800 MG TABLET    Take 1 tablet by mouth every 8 hours as needed for Pain or Fever    ONDANSETRON (ZOFRAN) 4 MG TABLET    Take 1 tablet by mouth 3 times daily as needed for Nausea or Vomiting       ALLERGIES     Gentamicin and Hydrocodone    FAMILY HISTORY           Problem Relation Age of Onset    Diabetes Father     Coronary Art Dis Maternal Grandmother     Colon Cancer Maternal Grandfather 27     Family Status   Relation Name Status    Father  (Not Specified)    MGM  (Not Specified)    MGF  (Not Specified)      None otherwise stated in nurses notes    SOCIAL HISTORY      reports that she has been smoking cigarettes. She started smoking about 14 years ago. She has a 10.00 pack-year smoking history. She has never used smokeless tobacco. She reports previous drug use. Drug: Marijuana. She reports that she does not drink alcohol. lives at home with others     PHYSICAL EXAM    (up to 7 for level 4, 8 or more for level 5)     ED Triage Vitals [10/23/20 1246]   BP Temp Temp Source Pulse Resp SpO2 Height Weight   117/69 98.3 °F (36.8 °C) Oral 75 18 96 % 5' 2\" (1.575 m) 206 lb 12.8 oz (93.8 kg)       Physical Exam   Nursing note and vitals reviewed. Constitutional: Oriented to person, place, and time and well-developed, well-nourished. Head: Normocephalic and atraumatic. Ear: External ears normal.  Tympanic membranes nonerythematous bilaterally with no canal erythema or swelling.   No mastoid tenderness. Nose: Nose normal and midline. Eyes: Conjunctivae and EOM are normal. Pupils are equal, round, and reactive to light. Neck: Normal range of motion. Neck supple. Throat: Posterior pharynx is erythematous with no tonsillar swelling or exudates. Uvula midline. Airway patent. No swelling. Cardiovascular: Normal rate, regular rhythm, normal heart sounds and intact distal pulses. Pulmonary/Chest: Effort normal and breath sounds normal. No respiratory distress. No wheezes. No rales. No chest tenderness. no rhonchi. Abdominal: Soft. Bowel sounds are normal. No distension and no mass. There is no tenderness. There is no rebound and no guarding. Musculoskeletal: Normal range of motion. Neurological: Alert and oriented to person, place, and time. GCS score is 15. Skin: Skin is warm and dry. No rash noted. No erythema. No pallor. Psychiatric: Mood, memory, affect and judgment normal.           DIAGNOSTIC RESULTS     EKG: All EKG's are interpreted by the Emergency Department Physician who either signs or Co-signs this chart in the absence of a cardiologist.        RADIOLOGY:   All plain film, CT, MRI, and formal ultrasound images (except ED bedside ultrasound) are read by the radiologist, see reports below, unless otherwise noted in MDM or here. XR CHEST PORTABLE   Final Result   Negative portable frontal view of the chest.  No evidence of acute   cardiopulmonary process. Ct Abdomen Pelvis W Iv Contrast Additional Contrast? None    Result Date: 10/8/2020  EXAMINATION: CT OF THE ABDOMEN AND PELVIS WITH CONTRAST 10/8/2020 9:03 pm TECHNIQUE: CT of the abdomen and pelvis was performed with the administration of intravenous contrast. Multiplanar reformatted images are provided for review. Dose modulation, iterative reconstruction, and/or weight based adjustment of the mA/kV was utilized to reduce the radiation dose to as low as reasonably achievable.  COMPARISON: CT pelvis performed guaiFENesin-dextromethorphan (ROBITUSSIN DM) 100-10 MG/5ML syrup     Sig: Take 5 mLs by mouth 3 times daily as needed for Cough     Dispense:  120 mL     Refill:  0         CONSULTS:  None    PROCEDURES:  None      FINAL IMPRESSION      1. URI with cough and congestion          DISPOSITION/PLAN   DISPOSITION Decision To Discharge    PATIENT REFERRED TO:  [facility]  [address]  [address] [address] [phone]  [facility]  [place] [address]  [phone]          DISCHARGE MEDICATIONS:  New Prescriptions    GUAIFENESIN-DEXTROMETHORPHAN (ROBITUSSIN DM) 100-10 MG/5ML SYRUP    Take 5 mLs by mouth 3 times daily as needed for Cough         Summation      Patient Course:    Dc Alamin with cough and congestion x 3 days. No fevers. No chest pain, sob. VSS. Pt is in no distress. Well appearing. Chest xray is unremarkable. Suspect viral URI vs covid. Did recommend outpatient covid testing. Work note provided. Isolation instructions provided. The patient's signs and symptoms are consistent with an acute mild viral illness. At this time there is significant evidence of Covid-19 community spread due to this pandemic, and I feel the patient most likely has mild Covid-19 or other viral illness. The patient is nontoxic and well appearing, no evidence of hypoxia or impending respiratory failure. The patient is tolerating PO. I do not feel the patient has evidence of significant dehydration or end organ failure at this time. The patient does not have risk factors for severe disease such as cardiovascular disease, hypertension, uncontrolled diabetes, chronic respiratory disease, underlying malignancy, immunocompromised, Age > 60 years. I do not feel the patient has an emergent medical condition at this time.   At this time there is a critical shortage of SARS-Coronavirus-2 PCR testing, very limited criteria for testing, and we are unable to test the patient at this time since criteria is not met. Direct patient contact is avoided at this time due to the critically low supplies of PPE worldwide and an effort to carter appropriate use of PPE. I performed a medical screening exam that is compliant with the Declaration of [place] [facility] in the [place], secondary to the Pandemic Infectious Disease of SARS-Coronavirus-2. We are not testing for influenza or other viral etiologies at this time due to the significant evidence of virus coinfections during this pandemic. The patient is referred to appropriate outpatient follow up through their PCP or [facility]. I discussed with the patient home isolation measures, anticipatory guidance, discharge instructions, and to return immediately for worsening of symptoms. The patient is agreeable with this plan. Discussed results and plan with the pt. They expressed appropriate understanding. Pt given close follow up, supportive care instructions and strict return instructions at the bedside.       ED Medications administered this visit:  Medications - No data to display    New Prescriptions from this visit:    New Prescriptions    GUAIFENESIN-DEXTROMETHORPHAN (ROBITUSSIN DM) 100-10 MG/5ML SYRUP    Take 5 mLs by mouth 3 times daily as needed for Cough       Follow-up:  [address] [address] [phone]  Call       [facility] ED  [place] [address]  [phone]            Final Impression:   1. URI with cough and congestion               (Please note that portions of this note were completed with a voice recognition program.  Efforts were made to edit the dictations but occasionally words are mis-transcribed.)      (Please note that portions of this note were completed with a voice recognition program.  Efforts were made to edit the dictations but occasionally words are mis-transcribed.)    Bari Abbasi [id number] PA-C  10/23/20 0642

## 2020-10-24 ENCOUNTER — CARE COORDINATION (OUTPATIENT)
Dept: CARE COORDINATION | Age: 25
End: 2020-10-24

## 2020-10-25 LAB — SARS-COV-2, NAA: NOT DETECTED

## 2020-10-27 NOTE — ED PROVIDER NOTES
eMERGENCY dEPARTMENT eNCOUnter   Independent Attestation     Pt Name: Nina Art  MRN: [id number]  [place] 1995  Date of evaluation: 10/27/20     Nina Art is a 30 y.o. female with CC: Cough; Pharyngitis; and Nasal Congestion      Based on the medical record the care appears appropriate. I was personally available for consultation in the Emergency Department.     Ferny Valadez DO  Attending Emergency Physician                  Ferny Valadez DO  10/27/20 1255

## 2020-12-29 ENCOUNTER — TELEPHONE (OUTPATIENT)
Dept: GASTROENTEROLOGY | Age: 25
End: 2020-12-29

## 2021-02-28 ENCOUNTER — HOSPITAL ENCOUNTER (OUTPATIENT)
Age: 26
Setting detail: SPECIMEN
Discharge: HOME | End: 2021-02-28
Payer: COMMERCIAL

## 2021-02-28 ENCOUNTER — OFFICE VISIT (OUTPATIENT)
Dept: FAMILY MEDICINE CLINIC | Age: 26
End: 2021-02-28
Payer: COMMERCIAL

## 2021-02-28 VITALS
HEART RATE: 87 BPM | WEIGHT: 206 LBS | TEMPERATURE: 96.8 F | BODY MASS INDEX: 37.91 KG/M2 | HEIGHT: 62 IN | OXYGEN SATURATION: 98 %

## 2021-02-28 DIAGNOSIS — Z20.822 COUGH WITH EXPOSURE TO COVID-19 VIRUS: ICD-10-CM

## 2021-02-28 DIAGNOSIS — R05.8 COUGH WITH EXPOSURE TO COVID-19 VIRUS: ICD-10-CM

## 2021-02-28 DIAGNOSIS — J06.9 VIRAL URI: Primary | ICD-10-CM

## 2021-02-28 DIAGNOSIS — J06.9 VIRAL URI: ICD-10-CM

## 2021-02-28 PROCEDURE — G8427 DOCREV CUR MEDS BY ELIG CLIN: HCPCS | Performed by: NURSE PRACTITIONER

## 2021-02-28 PROCEDURE — 4004F PT TOBACCO SCREEN RCVD TLK: CPT | Performed by: NURSE PRACTITIONER

## 2021-02-28 PROCEDURE — G8417 CALC BMI ABV UP PARAM F/U: HCPCS | Performed by: NURSE PRACTITIONER

## 2021-02-28 PROCEDURE — G8484 FLU IMMUNIZE NO ADMIN: HCPCS | Performed by: NURSE PRACTITIONER

## 2021-02-28 PROCEDURE — 99214 OFFICE O/P EST MOD 30 MIN: CPT | Performed by: NURSE PRACTITIONER

## 2021-02-28 RX ORDER — ALBUTEROL SULFATE 90 UG/1
2 AEROSOL, METERED RESPIRATORY (INHALATION) EVERY 4 HOURS PRN
Qty: 1 INHALER | Refills: 0 | Status: SHIPPED | OUTPATIENT
Start: 2021-02-28 | End: 2022-11-02

## 2021-02-28 ASSESSMENT — ENCOUNTER SYMPTOMS
HEARTBURN: 0
WHEEZING: 1
RHINORRHEA: 0
COUGH: 1
SHORTNESS OF BREATH: 0
SORE THROAT: 0
HEMOPTYSIS: 0

## 2021-02-28 ASSESSMENT — PATIENT HEALTH QUESTIONNAIRE - PHQ9
SUM OF ALL RESPONSES TO PHQ QUESTIONS 1-9: 2
2. FEELING DOWN, DEPRESSED OR HOPELESS: 1
SUM OF ALL RESPONSES TO PHQ QUESTIONS 1-9: 2
SUM OF ALL RESPONSES TO PHQ9 QUESTIONS 1 & 2: 2
SUM OF ALL RESPONSES TO PHQ QUESTIONS 1-9: 2

## 2021-02-28 NOTE — PROGRESS NOTES
[facility]   [address] [place] [address] [place]   Dept: [phone]  Dept Fax: [phone]    Jeremy Price is a 30 y.o. female who presents to the urgent care today for her medical conditions/complaints as notedbelow. Jeremy Price is c/o of Concern For COVID-19 (cough,diarrhea,b/a,x2 days, positive exposure to roomate)      HPI:     30 yr old female presents for cough, diarrhea, body aches for 2 days. + covid exposure from her roommate. Smoker, hx asthma. No chills, fevers, chest pain, sob. Inhaler is . No meds tried. Cough  This is a new problem. The current episode started in the past 7 days (x2d). The problem has been unchanged. The problem occurs every few minutes. The cough is non-productive. Associated symptoms include headaches, myalgias and wheezing (occasional). Pertinent negatives include no chest pain, chills, ear congestion, ear pain, fever, heartburn, hemoptysis, nasal congestion, postnasal drip, rash, rhinorrhea, sore throat, shortness of breath, sweats or weight loss. Nothing aggravates the symptoms. She has tried nothing for the symptoms. The treatment provided no relief. Her past medical history is significant for asthma and bronchitis.        Past Medical History:   Diagnosis Date    Anxiety     Asthma     Depression     Headache     IBS (irritable bowel syndrome)         Current Outpatient Medications   Medication Sig Dispense Refill    albuterol sulfate  (90 Base) MCG/ACT inhaler Inhale 2 puffs into the lungs every 4 hours as needed for Wheezing 1 Inhaler 0    dicyclomine (BENTYL) 10 MG capsule Take 1 capsule by mouth every 6 hours as needed (cramps) 20 capsule 0    ondansetron (ZOFRAN) 4 MG tablet Take 1 tablet by mouth 3 times daily as needed for Nausea or Vomiting 15 tablet 0    ibuprofen (ADVIL;MOTRIN) 800 MG tablet Take 1 tablet by mouth every 8 hours as needed for Pain or Fever 30 tablet 0 Effort: Pulmonary effort is normal. No respiratory distress. Breath sounds: Normal breath sounds. No stridor. No wheezing, rhonchi or rales. Chest:      Chest wall: No tenderness. Abdominal:      General: Bowel sounds are normal. There is no distension. Palpations: Abdomen is soft. Tenderness: There is no abdominal tenderness. Musculoskeletal: Normal range of motion. General: No tenderness or deformity. Lymphadenopathy:      Cervical: No cervical adenopathy. Skin:     General: Skin is warm and dry. Capillary Refill: Capillary refill takes less than 2 seconds. Findings: No rash ( no rash to visible skin). Neurological:      General: No focal deficit present. Mental Status: She is alert and oriented to person, place, and time. Motor: No abnormal muscle tone. Coordination: Coordination normal.   Psychiatric:         Mood and Affect: Mood normal.         Behavior: Behavior normal.       Pulse 87   Temp 96.8 °F (36 °C) (Infrared)   Ht 5' 2\" (1.575 m)   Wt 206 lb (93.4 kg)   SpO2 98%   BMI 37.68 kg/m²     Assessment:       Diagnosis Orders   1. Viral URI  COVID-19   2. Cough with exposure to COVID-19 virus  COVID-19       Plan:    hx asthma, inhaler is . Known covid exposure  Alb inhaler refilled  Reviewed over-the-counter treatments for symptom management. Will send out COVID19 testing. Possible treatment alterations based on the results. Patient instructed to self-quarantine until testing results are back. Patient instructed not to return to work until results are back. Tylenol as needed for fever/pain. Encouraged adequate hydration and rest.  The patient indicates understanding of these issues and agrees with the plan. Educational materials provided on AVS.  Follow up if symptoms do not improve/worsen. Discussed symptoms that will warrant urgent ED evaluation/treatment. Return for make appt with Family Doc in 3-4 days for recheck. Orders Placed This Encounter   Medications    albuterol sulfate  (90 Base) MCG/ACT inhaler     Sig: Inhale 2 puffs into the lungs every 4 hours as needed for Wheezing     Dispense:  1 Inhaler     Refill:  0         Patient given educational materials - see patient instructions. Discussed use, benefit, and side effects of prescribed medications. All patient questions answered. Pt voicedunderstanding.     Electronically signed by TOYA Hooker CNP on 2/28/2021 at 1:51 PM

## 2021-02-28 NOTE — PATIENT INSTRUCTIONS
Follow up with family doctor in 1 week as needed. Return immediately if worse, new symptoms develop, symptoms persist or have any questions or concerns. Push fluids, keep hydrated  Cool mist humidifier bedside  Continue all medications as prescribed  May alternate tylenol/motrin over the counter for pain or fever, take per package instructions. The COVID-19 test that was done today can take 1-6 days for results. Until then you should assume you have this disease and adhere to home isolation as described below. When we get the test results back, one of the following readings will be obtained. 1. A positive test means you have the virus. 2.  An inconclusive test means it wasn't sure if you have the virus or not. An inconclusive test result is treated as a positive result and recommendations  are the same as a positive test result. We may ask you to repeat this test in this circumstance. 3.  A negative test means you probably do not have the virus, but it is not conclusive. Prevention steps for People with positive or inconclusive test results or suspected  COVID-19 (including persons under investigation) who do not need to be hospitalized  and   People with confirmed COVID-19 who were hospitalized and determined to be medically stable to go home    You can be around others after:    10 days since symptoms first appeared and  24 hours with no fever without the use of fever-reducing medications and  Other symptoms of COVID-19 are improving*  *Loss of taste and smell may persist for weeks or months after recovery and need not delay the end of isolation    Most people do not require testing to decide when they can be around others; however, if your healthcare provider recommends testing, they will let you know when you can resume being around others based on your test results. Note that these recommendations do not apply to persons with severe COVID-19 or with severely weakened immune systems (immunocompromised). These persons should follow the guidance below for I was severely ill with COVID-19 or have a severely weakened immune system (immunocompromised) due to a health condition or medication. When can I be around others?     [email or web address]    Contacts who are NOT healthcare providers or first responders and are asymptomatic (no fever,  cough, shortness of breath, or difficulty breathing) should self-quarantine for 14 days from the last  date of exposure to confirmed COVID-19. Your healthcare provider and public health staff will evaluate whether you can be cared for at home. If it is determined that you do not need to be hospitalized and can be isolated at home, you will be monitored by staff from your health department. You should follow the prevention steps below until a healthcare provider or local or state health department says you can return to your normal activities. Stay home except to get medical care  People who are mildly ill with COVID-19 are able to isolate at home during their illness. You should restrict activities outside your home, except for getting medical care. Do not go to work, school, or public areas. Avoid using public transportation, ride-sharing, or taxis. Separate yourself from other people and animals in your home  People: As much as possible, you should stay in a specific room and away from other people in your home. Also, you should use a separate bathroom, if available. Animals: You should restrict contact with pets and other animals while you are sick with COVID-19, just like you would around other people. When possible, have another member of your household care for your animals while you are sick. If you are sick with COVID-19, avoid contact with your pet, including petting, snuggling, being kissed or licked, and sharing food. If you must care for your pet or be around animals while you are sick, wash your hands before and after you interact with pets and wear a facemask. Call ahead before visiting your doctor  If you have a medical appointment, call the healthcare provider and tell them that you have or may have COVID-19. This will help the healthcare providers office take steps to keep other people from getting infected or exposed. Wear a facemask  You should wear a facemask when you are around other people (e.g., sharing a room or vehicle) or pets and before you enter a healthcare providers office. If you are not able to wear a facemask (for example, because it causes trouble breathing), then people who live with you should not stay in the same room with you; they should also wear a facemask if they enter your room. Cover your coughs and sneezes  Cover your mouth and nose with a tissue when you cough or sneeze. Throw used tissues in a lined trash can. Immediately wash your hands with soap and water for at least 20 seconds or, if soap and water are not available, clean your hands with an alcohol-based hand  that contains at least 60% alcohol. Clean your hands often  Wash your hands often with soap and water for at least 20 seconds, especially after blowing your nose, coughing, or sneezing; going to the bathroom; and before eating or preparing food. If soap and water are not readily available, use an alcohol-based hand  with at least 60% alcohol, covering all surfaces of your hands and rubbing them together until they feel dry. Soap and water are the best option if hands are visibly dirty. Avoid touching your eyes, nose, and mouth with unwashed hands. Avoid sharing personal household items  You should not share dishes, drinking glasses, cups, eating utensils, towels, or bedding with other people or pets in your home. After using these items, they should be washed thoroughly with soap and water. Clean all high-touch surfaces everyday  High touch surfaces include counters, tabletops, doorknobs, bathroom fixtures, toilets, phones, keyboards, tablets, and bedside tables. Also, clean any surfaces that may have blood, stool, or body fluids on them. Use a household cleaning spray or wipe, according to the label instructions. Labels contain instructions for safe and effective use of the cleaning product including precautions you should take when applying the product, such as wearing gloves and making sure you have good ventilation during use of the product. Monitor your symptoms  Seek prompt medical attention if your illness is worsening (e.g., difficulty breathing). Before seeking care, call your healthcare provider and tell them that you have, or are being evaluated for, COVID-19. Put on a facemask before you enter the facility. These steps will help the healthcare providers office to keep other people in the office or waiting room from getting infected or exposed. Persons who are placed under active monitoring or facilitated self-monitoring should follow instructions provided by their local health department or occupational health professionals, as appropriate. When working with your local health department check their available hours. If you have a medical emergency and need to call 911, notify the dispatch personnel that you have, or are being evaluated for COVID-19. If possible, put on a facemask before emergency medical services arrive.   Discontinuing home isolation Patients with confirmed COVID-19 should remain under home isolation precautions until the risk of secondary transmission to others is thought to be low. The decision to discontinue home isolation precautions should be made on a case-by-case basis, in consultation with your physician and the health department. Please do NOT make this decision on your own. If your results of the COVID-19 test is NEGATIVE -     The patient may stop isolation, in consultation with your health care provider, typically when: Your healthcare provider has determined that the cause of the illness is NOT COVID-19 and approves your return to work. OR  Ten (10) days have passed since onset of symptoms AND one day (24 hours) have passed with no fever without taking medication (like Tylenol) to reduce fever,  respiratory symptoms have resolved and you have been evaluated by your health care provider. Please follow up with your physician for evaluation about this. The following websites are the best places for up to date information on this fluid situation. [email or web address]      Caye Medici- keeps someone who might have been exposed to the virus away from others  Isolation  keeps someone who is infected with the virus away from others, even in their homes    Scenario 1    Your patient has close contact with an individual who has COVID-19. Your patient will not have further contact. Plan  Your patient is quarantined from the last day of contact for 14 days    Scenario 2   Your patient has lives with someone who has COVID-19 but can avoid further contact. Plan  Your patient is quarantined for 14 days starting when the person with COVID-19 begins home isolation. Scenario 3    Your patient is under quarantine and has additional close contact with someone else who has COVID-19. Plan  Your patient must restart quarantine from the last COVID-19 exposure.     Scenario 4 Your patient lives with someone who has COVID-19 and cannot avoid close contact from them. Plan  Your patient is quarantined while the other person is isolating and for 14 days after covid  19 person meets the criteria to end home isolation.

## 2021-03-01 LAB
SARS-COV-2: NORMAL
SARS-COV-2: NOT DETECTED
SOURCE: NORMAL

## 2021-03-03 ENCOUNTER — HOSPITAL ENCOUNTER (EMERGENCY)
Age: 26
Discharge: HOME | End: 2021-03-03
Attending: EMERGENCY MEDICINE
Payer: COMMERCIAL

## 2021-03-03 VITALS
BODY MASS INDEX: 37.91 KG/M2 | HEIGHT: 62 IN | HEART RATE: 70 BPM | WEIGHT: 206 LBS | DIASTOLIC BLOOD PRESSURE: 67 MMHG | SYSTOLIC BLOOD PRESSURE: 113 MMHG | OXYGEN SATURATION: 97 % | TEMPERATURE: 97.7 F | RESPIRATION RATE: 16 BRPM

## 2021-03-03 DIAGNOSIS — J06.9 UPPER RESPIRATORY TRACT INFECTION, UNSPECIFIED TYPE: Primary | ICD-10-CM

## 2021-03-03 PROCEDURE — 99284 EMERGENCY DEPT VISIT MOD MDM: CPT

## 2021-03-03 RX ORDER — BUSPIRONE HYDROCHLORIDE 5 MG/1
7.5 TABLET ORAL 3 TIMES DAILY
COMMUNITY

## 2021-03-03 RX ORDER — FLUOXETINE 10 MG/1
10 TABLET, FILM COATED ORAL DAILY
COMMUNITY

## 2021-03-03 ASSESSMENT — ENCOUNTER SYMPTOMS
COUGH: 1
EYE DISCHARGE: 0
CHEST TIGHTNESS: 0
TROUBLE SWALLOWING: 0
EYE REDNESS: 0
VOMITING: 0
DIARRHEA: 0
NAUSEA: 0
ABDOMINAL PAIN: 0
SHORTNESS OF BREATH: 0
WHEEZING: 0
BLOOD IN STOOL: 0
RHINORRHEA: 0
FACIAL SWELLING: 0
EYE PAIN: 0
CONSTIPATION: 0
BACK PAIN: 0
COLOR CHANGE: 0
SINUS PRESSURE: 0
SORE THROAT: 1

## 2021-03-03 ASSESSMENT — PAIN DESCRIPTION - DESCRIPTORS: DESCRIPTORS: ACHING

## 2021-03-03 ASSESSMENT — PAIN DESCRIPTION - LOCATION: LOCATION: MOUTH;THROAT

## 2021-03-03 ASSESSMENT — PAIN SCALES - GENERAL: PAINLEVEL_OUTOF10: 5

## 2021-03-03 NOTE — ED PROVIDER NOTES
[address]  eMERGENCY dEPARTMENT eNCOUnter      Pt Name: Amarjit Valdivia  MRN: [id number]  [place] 1995  Date of evaluation: 3/3/21      CHIEF COMPLAINT       Chief Complaint   Patient presents with    Mouth Lesions    Pharyngitis    Cough         HISTORY OF PRESENT ILLNESS    Amarjit Valdivia is a 30 y.o. female who presents complaining of bumps on tongue. Patient has been sick for a few days with a cough sore throat and generalized fatigue. Patient had no fever no shortness of breath no vomiting. Patient has already been tested for Covid and was negative. Patient is here just because she was having hard bumps on the back part of her tongue that she was concerned might be something more. Patient states otherwise she has been taking care of her cold with over-the-counter medications. REVIEW OF SYSTEMS       Review of Systems   Constitutional: Negative for activity change, appetite change, chills, diaphoresis and fever. HENT: Positive for congestion, mouth sores and sore throat. Negative for ear pain, facial swelling, nosebleeds, rhinorrhea, sinus pressure and trouble swallowing. Eyes: Negative for pain, discharge and redness. Respiratory: Positive for cough. Negative for chest tightness, shortness of breath and wheezing. Cardiovascular: Negative for chest pain, palpitations and leg swelling. Gastrointestinal: Negative for abdominal pain, blood in stool, constipation, diarrhea, nausea and vomiting. Genitourinary: Negative for difficulty urinating, dysuria, flank pain, frequency, genital sores and hematuria. Musculoskeletal: Negative for arthralgias, back pain, gait problem, joint swelling, myalgias and neck pain. Skin: Negative for color change, pallor, rash and wound. Neurological: Negative for dizziness, tremors, seizures, syncope, speech difficulty, weakness, numbness and headaches.    Psychiatric/Behavioral: Negative for confusion, decreased concentration, hallucinations, self-injury, sleep disturbance and suicidal ideas. PAST MEDICAL HISTORY     Past Medical History:   Diagnosis Date    Anxiety     Asthma     Depression     Headache     IBS (irritable bowel syndrome)        SURGICAL HISTORY       Past Surgical History:   Procedure Laterality Date    CHOLECYSTECTOMY, LAPAROSCOPIC N/A 6/22/2020    CHOLECYSTECTOMY LAPAROSCOPIC performed by Coleman Ma MD at [address], [address]  06/2020    TONGUE SURGERY      WISDOM TOOTH EXTRACTION         CURRENT MEDICATIONS       Current Discharge Medication List      CONTINUE these medications which have NOT CHANGED    Details   albuterol sulfate  (90 Base) MCG/ACT inhaler Inhale 2 puffs into the lungs every 4 hours as needed for Wheezing  Qty: 1 Inhaler, Refills: 0      dicyclomine (BENTYL) 10 MG capsule Take 1 capsule by mouth every 6 hours as needed (cramps)  Qty: 20 capsule, Refills: 0      ondansetron (ZOFRAN) 4 MG tablet Take 1 tablet by mouth 3 times daily as needed for Nausea or Vomiting  Qty: 15 tablet, Refills: 0             ALLERGIES     is allergic to gentamicin and hydrocodone. SOCIAL HISTORY      reports that she has been smoking cigarettes. She started smoking about 15 years ago. She has a 10.00 pack-year smoking history. She has never used smokeless tobacco. She reports previous drug use. Drug: Marijuana. She reports that she does not drink alcohol. PHYSICAL EXAM     INITIAL VITALS: /67   Pulse 70   Temp 97.7 °F (36.5 °C) (Oral)   Resp 16   Ht 5' 2\" (1.575 m)   Wt 206 lb (93.4 kg)   LMP 02/15/2021   SpO2 97%   BMI 37.68 kg/m²      Physical Exam  Vitals signs and nursing note reviewed. Constitutional:       General: She is not in acute distress. Appearance: She is well-developed. She is not diaphoretic. HENT:      Head: Normocephalic and atraumatic.       Mouth/Throat:      Mouth: Mucous membranes are moist. Oral lesions (Few swollen taste buds on the posterior third of the tongue) present. Pharynx: No pharyngeal swelling, oropharyngeal exudate or posterior oropharyngeal erythema. Tonsils: No tonsillar exudate or tonsillar abscesses. Eyes:      General: No scleral icterus. Right eye: No discharge. Left eye: No discharge. Conjunctiva/sclera: Conjunctivae normal.      Pupils: Pupils are equal, round, and reactive to light. Pulmonary:      Effort: Pulmonary effort is normal. No respiratory distress. Skin:     General: Skin is warm and dry. Coloration: Skin is not pale. Findings: No erythema or rash. Neurological:      Mental Status: She is alert and oriented to person, place, and time. Psychiatric:         Behavior: Behavior normal.         Thought Content: Thought content normal.         Judgment: Judgment normal.         DIAGNOSTIC RESULTS     RADIOLOGY:All plain film, CT,MRI, and formal ultrasound images (except ED bedside ultrasound) are read by the radiologist and the interpretations are directly viewed by the emergency physician. LABS: All lab results were reviewed by myself, and all abnormals are listed below. Labs Reviewed - No data to display      MEDICAL DECISION MAKING:     Patient symptoms I believe are all just related to her viral illness. No work-up is necessary. EMERGENCY DEPARTMENT COURSE:   Vitals:    Vitals:    03/03/21 0559   BP: 113/67   Pulse: 70   Resp: 16   Temp: 97.7 °F (36.5 °C)   TempSrc: Oral   SpO2: 97%   Weight: 206 lb (93.4 kg)   Height: 5' 2\" (1.575 m)       The patient was given the following medications while in the emergency department:  No orders of the defined types were placed in this encounter. -------------------------      CONSULTS:  None    PROCEDURES:  None    FINAL IMPRESSION      1.  Upper respiratory tract infection, unspecified type          DISPOSITION/PLAN   DISPOSITION Decision To Discharge 03/03/2021 06:04:58 AM      PATIENT REFERREDTO:  Stephon Kern MD [address]  [phone]    In 1 week      [facility] ED  [address] [facility]  [phone]    If symptoms worsen      DISCHARGEMEDICATIONS:  Current Discharge Medication List          (Please note that portions of this note were completed with a voice recognition program.  Efforts were made to edit thedictations but occasionally words are mis-transcribed.)    Lakisha Turenr MD  Attending Emergency Physician                        Lakisha Turner MD  03/03/21 [id number]

## 2021-03-03 NOTE — ED TRIAGE NOTES
Mode of arrival (squad #, walk in, police, etc) : car        Chief complaint(s): mouth lesions and sore throat        Arrival Note (brief scenario, treatment PTA, etc). : sore throat 3 days and lesions last night        C= \"Have you ever felt that you should Cut down on your drinking? \"  No  A= \"Have people Annoyed you by criticizing your drinking? \"  No  G= \"Have you ever felt bad or Guilty about your drinking? \"  No  E= \"Have you ever had a drink as an Eye-opener first thing in the morning to steady your nerves or to help a hangover? \"  No      Deferred []      Reason for deferring: N/A    *If yes to two or more: probable alcohol abuse. *

## 2021-03-10 ENCOUNTER — OFFICE VISIT (OUTPATIENT)
Dept: GASTROENTEROLOGY | Age: 26
End: 2021-03-10
Payer: COMMERCIAL

## 2021-03-10 ENCOUNTER — HOSPITAL ENCOUNTER (OUTPATIENT)
Age: 26
Discharge: HOME | End: 2021-03-10
Payer: COMMERCIAL

## 2021-03-10 VITALS
BODY MASS INDEX: 39.32 KG/M2 | WEIGHT: 215 LBS | DIASTOLIC BLOOD PRESSURE: 78 MMHG | SYSTOLIC BLOOD PRESSURE: 126 MMHG | HEART RATE: 65 BPM

## 2021-03-10 DIAGNOSIS — K52.9 CHRONIC DIARRHEA: ICD-10-CM

## 2021-03-10 DIAGNOSIS — R10.11 RUQ PAIN: Primary | ICD-10-CM

## 2021-03-10 DIAGNOSIS — R10.11 RUQ PAIN: ICD-10-CM

## 2021-03-10 PROCEDURE — 4004F PT TOBACCO SCREEN RCVD TLK: CPT | Performed by: INTERNAL MEDICINE

## 2021-03-10 PROCEDURE — G8427 DOCREV CUR MEDS BY ELIG CLIN: HCPCS | Performed by: INTERNAL MEDICINE

## 2021-03-10 PROCEDURE — 99213 OFFICE O/P EST LOW 20 MIN: CPT | Performed by: INTERNAL MEDICINE

## 2021-03-10 PROCEDURE — G8417 CALC BMI ABV UP PARAM F/U: HCPCS | Performed by: INTERNAL MEDICINE

## 2021-03-10 PROCEDURE — G8484 FLU IMMUNIZE NO ADMIN: HCPCS | Performed by: INTERNAL MEDICINE

## 2021-03-10 PROCEDURE — 83516 IMMUNOASSAY NONANTIBODY: CPT

## 2021-03-10 PROCEDURE — 36415 COLL VENOUS BLD VENIPUNCTURE: CPT

## 2021-03-10 RX ORDER — POLYETHYLENE GLYCOL 3350 17 G/17G
POWDER, FOR SOLUTION ORAL
Qty: 238 G | Refills: 0 | Status: ON HOLD | OUTPATIENT
Start: 2021-03-10 | End: 2021-04-07 | Stop reason: ALTCHOICE

## 2021-03-10 RX ORDER — COLESEVELAM 180 1/1
625 TABLET ORAL 2 TIMES DAILY WITH MEALS
Qty: 180 TABLET | Refills: 1 | Status: SHIPPED | OUTPATIENT
Start: 2021-03-10 | End: 2021-03-24

## 2021-03-10 RX ORDER — BISACODYL 5 MG
TABLET, DELAYED RELEASE (ENTERIC COATED) ORAL
Qty: 4 TABLET | Refills: 0 | Status: ON HOLD | OUTPATIENT
Start: 2021-03-10 | End: 2021-04-07 | Stop reason: ALTCHOICE

## 2021-03-10 ASSESSMENT — ENCOUNTER SYMPTOMS
BLOOD IN STOOL: 0
ABDOMINAL PAIN: 1
COUGH: 0
CONSTIPATION: 1
NAUSEA: 1
CHOKING: 0
TROUBLE SWALLOWING: 0
BACK PAIN: 1
ANAL BLEEDING: 0
ALLERGIC/IMMUNOLOGIC NEGATIVE: 1
VOMITING: 0
DIARRHEA: 0
RESPIRATORY NEGATIVE: 1
ABDOMINAL DISTENTION: 1
SORE THROAT: 0
SINUS PRESSURE: 0
WHEEZING: 0
RECTAL PAIN: 1
VOICE CHANGE: 0

## 2021-03-10 NOTE — PROGRESS NOTES
Reason for Referral:   Alesia Bush MD  [address]  [facility],  [address] [facility] [facility]    Chief Complaint   Patient presents with    Abdominal Pain     She had her gallbladder out last year and since then she has had a lot of issues. TrPain from the riht side to her back.  Constipation     Bowel habits have changed as well. very difficut as well     Nausea     Constantly nausous  and occasionally throws up. HISTORY OF PRESENT ILLNESS: Ms.Brittany Ina Moura is a 30 y.o. female chronic nausea and diarrhea. Abdominal pain. Her pain is in right upper quadrant. Almost constant. Daily. Worse with physical activity. Worse at night. Sometimes worse after eating. Some nausea. No vomiting. No blood in the stool or melena except for occasional rectal bleeding from hemorrhoids. She has had issues with diarrhea for several years. At least 5 years. 5-7 bowel movements per day. Sometimes at night. She reports a negative colonoscopy several years ago done for rectal bleeding. She had cholecystectomy couple of years ago. Her diarrhea probably got worse after that. No family history of known IBD. Great grandfather  from colon cancer. Parents and siblings with no known GI pathology. She smokes marijuana daily. She has been doing that for a year. No other drugs. No alcohol. Past Medical,Family, and Social History reviewed and does not contribute to the patient presentingcondition. Patient's PMH/PSH,SH,PSYCH Hx, MEDs, ALLERGIES, and ROS were all reviewed and updated in the appropriate sections.     PAST MEDICAL HISTORY:  Past Medical History:   Diagnosis Date    Anxiety     Asthma     Depression     Headache     IBS (irritable bowel syndrome)        Past Surgical History:   Procedure Laterality Date    CHOLECYSTECTOMY, LAPAROSCOPIC N/A 2020    CHOLECYSTECTOMY LAPAROSCOPIC performed by Avel Godinez MD at [address] [facility] , [address]  2020    TONGUE SURGERY      WISDOM TOOTH EXTRACTION         CURRENT MEDICATIONS:    Current Outpatient Medications:     busPIRone (BUSPAR) 5 MG tablet, Take 7.5 mg by mouth 3 times daily, Disp: , Rfl:     FLUoxetine (PROZAC) 10 MG tablet, Take 10 mg by mouth daily, Disp: , Rfl:     albuterol sulfate  (90 Base) MCG/ACT inhaler, Inhale 2 puffs into the lungs every 4 hours as needed for Wheezing, Disp: 1 Inhaler, Rfl: 0    dicyclomine (BENTYL) 10 MG capsule, Take 1 capsule by mouth every 6 hours as needed (cramps), Disp: 20 capsule, Rfl: 0    ondansetron (ZOFRAN) 4 MG tablet, Take 1 tablet by mouth 3 times daily as needed for Nausea or Vomiting, Disp: 15 tablet, Rfl: 0    ALLERGIES:   Allergies   Allergen Reactions    Gentamicin     Hydrocodone Nausea Only       FAMILY HISTORY:       Problem Relation Age of Onset    Diabetes Father     Coronary Art Dis Maternal Grandmother     Colon Cancer Maternal Grandfather 30         SOCIAL HISTORY:   Social History     Socioeconomic History    Marital status: Single     Spouse name: Not on file    Number of children: 0    Years of education: Not on file    Highest education level: High school graduate   Occupational History    Occupation: cook   Social Needs    Financial resource strain: Not on file    Food insecurity     Worry: Not on file     Inability: Not on file   [employer] needs     Medical: Not on file     Non-medical: Not on file   Tobacco Use    Smoking status: Current Every Day Smoker     Packs/day: 1.00     Years: 10.00     Pack years: 10.00     Types: Cigarettes     Start date: 2006    Smokeless tobacco: Never Used   Substance and Sexual Activity    Alcohol use: Never     Frequency: Never    Drug use: Not Currently     Types: Marijuana    Sexual activity: Yes     Partners: Female   Lifestyle    Physical activity     Days per week: Not on file     Minutes per session: Not on file    Stress: Not on file   Relationships    Social connections Talks on phone: Not on file     Gets together: Not on file     Attends Mormonism service: Not on file     Active member of club or organization: Not on file     Attends meetings of clubs or organizations: Not on file     Relationship status: Not on file    Intimate partner violence     Fear of current or ex partner: Not on file     Emotionally abused: Not on file     Physically abused: Not on file     Forced sexual activity: Not on file   Other Topics Concern    Not on file   Social History Narrative    Lives with fiance       REVIEW OF SYSTEMS: A 12-point review of systemswas obtained and pertinent positives and negatives were enumerated above in the history of present illness. All other reviewed systems / symptoms were negative. Review of Systems   Constitutional: Positive for appetite change and fatigue. Negative for unexpected weight change. HENT: Negative. Negative for dental problem, postnasal drip, sinus pressure, sore throat, trouble swallowing and voice change. Eyes: Positive for visual disturbance. Respiratory: Negative. Negative for cough, choking and wheezing. Cardiovascular: Negative. Negative for chest pain, palpitations and leg swelling. Gastrointestinal: Positive for abdominal distention, abdominal pain, constipation, nausea and rectal pain (Hemorrhoid). Negative for anal bleeding, blood in stool, diarrhea and vomiting. Endocrine: Negative. Genitourinary: Negative. Negative for difficulty urinating. Musculoskeletal: Positive for back pain. Negative for arthralgias, gait problem and myalgias. Allergic/Immunologic: Negative. Negative for environmental allergies and food allergies. Neurological: Negative. Negative for dizziness, weakness, light-headedness, numbness and headaches. Hematological: Does not bruise/bleed easily. Psychiatric/Behavioral: Positive for sleep disturbance. The patient is nervous/anxious.             LABORATORY DATA: Reviewed  Lab Results Component Value Date    WBC 10.5 10/20/2020    HGB 15.0 10/20/2020    HCT 45.3 10/20/2020    MCV 93.5 10/20/2020     10/20/2020     10/20/2020    K 4.8 10/20/2020     10/20/2020    CO2 22 10/20/2020    BUN 17 10/20/2020    CREATININE 0.82 10/20/2020    LABALBU 4.4 10/20/2020    BILITOT <0.15 (L) 10/20/2020    ALKPHOS 77 10/20/2020    AST 18 10/20/2020    ALT 18 10/20/2020         Lab Results   Component Value Date    RBC 4.85 10/20/2020    HGB 15.0 10/20/2020    MCV 93.5 10/20/2020    MCH 30.9 10/20/2020    MCHC 33.1 10/20/2020    RDW 13.7 10/20/2020    MPV 8.0 10/20/2020    BASOPCT 1 10/20/2020    LYMPHSABS 2.70 10/20/2020    MONOSABS 0.50 10/20/2020    NEUTROABS 7.10 10/20/2020    EOSABS 0.20 10/20/2020    BASOSABS 0.10 10/20/2020         DIAGNOSTIC TESTING:     No results found. LMP 02/15/2021     PHYSICAL EXAMINATION: Vital signs reviewed per the nursing documentation. There is no height or weight on file to calculate BMI. Physical Exam      I personally reviewed the nurse's notes and documentation and I agree with her notes. General: alert, appears stated age and cooperative Psych: Normal. and Alert and oriented, appropriate affect. . Normal affect. Mentation normal  HEENT: PERRLA. Clear conjunctivae and sclerae. Moist oral mucosae, no lesions or ulcers. The neck is supple, without lymphadenopathy or jugular venous distension. No masses. Normal thyroid. Cardiovascular: S1 S2 RRR no rubs or murmurs. Pulmonary: clear BL. No accessory muscle usage. Abdominal Exam: Soft, mild tenderness over the liver ND, no hepato or spleno megaly, +BS, no ascites. No groin masses or lymphadenopathy. Extremities: no lower ext edema. Skin: Warm skin. No skin rash. No spider nevi palmar erythema nail dystrophy. Joint: No joint swelling or deformity. Neurological: intact sensory. DTR+. IMPRESSION: Ms. Wilton Rasmussen is a 30 y.o. female with chronic abdominal pain.   Very likely due to fatty liver disease. Will obtain ultrasound of the liver. Chronic diarrhea. Trial of cholestyramine. Separate from all other medications by at least 2 hours. If this does not work we may need to try align and proactive Imodium. Plan for colonoscopy with biopsies. Check celiac serology. Follow-up in 4 to 6 weeks. Spent 30 minutes providing patient education and counseling. Thank you for allowing me to participate in the care of Ms. Anastasia Gerardo. For any further questions please do not hesitate to contact me. I have reviewed and agree with the MA/LPN ROS. Note is dictated utilizing voice recognition software. Unfortunately this leads to occasional typographical errors. Please contact our office if you have any questions.     Cayla Batista MD  [facility]  O: #[phone]

## 2021-03-11 LAB — TISSUE TRANSGLUTAMINASE IGA: 0.1 U/ML

## 2021-03-15 ENCOUNTER — HOSPITAL ENCOUNTER (OUTPATIENT)
Dept: ULTRASOUND IMAGING | Age: 26
Discharge: HOME | End: 2021-03-17
Payer: COMMERCIAL

## 2021-03-15 DIAGNOSIS — R10.11 RUQ PAIN: ICD-10-CM

## 2021-03-15 PROCEDURE — 76705 ECHO EXAM OF ABDOMEN: CPT

## 2021-03-17 ENCOUNTER — TELEPHONE (OUTPATIENT)
Dept: GASTROENTEROLOGY | Age: 26
End: 2021-03-17

## 2021-03-17 NOTE — TELEPHONE ENCOUNTER
Pravin was at the office today asking about ultrasound results. Writer asked if she has MyChart and she states she does, but doesn't understand the results. She is asking for a return call.

## 2021-03-17 NOTE — TELEPHONE ENCOUNTER
Call is returned and results are given. Patient asks what Dr is going to do to find out what is going on. Patient is informed dr will go over results and next plan at her upcoming appt.

## 2021-03-24 ENCOUNTER — HOSPITAL ENCOUNTER (OUTPATIENT)
Dept: PREADMISSION TESTING | Age: 26
Discharge: HOME | End: 2021-03-28
Payer: COMMERCIAL

## 2021-03-24 VITALS — WEIGHT: 218 LBS | BODY MASS INDEX: 40.12 KG/M2 | HEIGHT: 62 IN

## 2021-03-24 RX ORDER — FAMOTIDINE 20 MG/1
20 TABLET, FILM COATED ORAL 2 TIMES DAILY
COMMUNITY

## 2021-03-24 NOTE — PROGRESS NOTES

## 2021-04-01 ENCOUNTER — TELEPHONE (OUTPATIENT)
Dept: GASTROENTEROLOGY | Age: 26
End: 2021-04-01

## 2021-04-01 DIAGNOSIS — K52.9 CHRONIC DIARRHEA: Primary | ICD-10-CM

## 2021-04-01 RX ORDER — CHOLESTYRAMINE 4 G/9G
1 POWDER, FOR SUSPENSION ORAL
Qty: 90 PACKET | Refills: 3 | Status: SHIPPED | OUTPATIENT
Start: 2021-04-01

## 2021-04-03 ENCOUNTER — HOSPITAL ENCOUNTER (OUTPATIENT)
Dept: LAB | Age: 26
Setting detail: SPECIMEN
Discharge: HOME | End: 2021-04-03
Payer: COMMERCIAL

## 2021-04-03 DIAGNOSIS — Z01.818 PREOP TESTING: Primary | ICD-10-CM

## 2021-04-03 PROCEDURE — U0003 INFECTIOUS AGENT DETECTION BY NUCLEIC ACID (DNA OR RNA); SEVERE ACUTE RESPIRATORY SYNDROME CORONAVIRUS 2 (SARS-COV-2) (CORONAVIRUS DISEASE [COVID-19]), AMPLIFIED PROBE TECHNIQUE, MAKING USE OF HIGH THROUGHPUT TECHNOLOGIES AS DESCRIBED BY CMS-2020-01-R: HCPCS

## 2021-04-03 PROCEDURE — U0005 INFEC AGEN DETEC AMPLI PROBE: HCPCS

## 2021-04-04 LAB
SARS-COV-2: NORMAL
SARS-COV-2: NOT DETECTED
SOURCE: NORMAL

## 2021-04-05 ENCOUNTER — TELEPHONE (OUTPATIENT)
Dept: GASTROENTEROLOGY | Age: 26
End: 2021-04-05

## 2021-04-06 ENCOUNTER — ANESTHESIA EVENT (OUTPATIENT)
Dept: ENDOSCOPY | Age: 26
End: 2021-04-06
Payer: COMMERCIAL

## 2021-04-06 NOTE — TELEPHONE ENCOUNTER
Talked to [facility] to ask if she can change procedure time on 04/07/21. She confirmed she will arrive at 9:00 am for procedure at 11:00 am.  She asked for verbal instructions on bowel prep. Reviewed Miralax/Dulcolax/Mag Cit instructions and she voiced understanding.

## 2021-04-07 ENCOUNTER — HOSPITAL ENCOUNTER (OUTPATIENT)
Age: 26
Setting detail: OUTPATIENT SURGERY
Discharge: HOME | End: 2021-04-07
Attending: INTERNAL MEDICINE | Admitting: INTERNAL MEDICINE
Payer: COMMERCIAL

## 2021-04-07 ENCOUNTER — ANESTHESIA (OUTPATIENT)
Dept: ENDOSCOPY | Age: 26
End: 2021-04-07
Payer: COMMERCIAL

## 2021-04-07 VITALS
HEART RATE: 66 BPM | RESPIRATION RATE: 16 BRPM | BODY MASS INDEX: 40.12 KG/M2 | OXYGEN SATURATION: 100 % | SYSTOLIC BLOOD PRESSURE: 105 MMHG | TEMPERATURE: 97.7 F | HEIGHT: 62 IN | DIASTOLIC BLOOD PRESSURE: 59 MMHG | WEIGHT: 218 LBS

## 2021-04-07 VITALS — OXYGEN SATURATION: 97 % | SYSTOLIC BLOOD PRESSURE: 128 MMHG | DIASTOLIC BLOOD PRESSURE: 92 MMHG

## 2021-04-07 DIAGNOSIS — K52.9 ILEITIS: Primary | ICD-10-CM

## 2021-04-07 LAB
-: NORMAL
HCG, PREGNANCY URINE (POC): NEGATIVE

## 2021-04-07 PROCEDURE — 6360000002 HC RX W HCPCS: Performed by: NURSE ANESTHETIST, CERTIFIED REGISTERED

## 2021-04-07 PROCEDURE — 3700000000 HC ANESTHESIA ATTENDED CARE: Performed by: INTERNAL MEDICINE

## 2021-04-07 PROCEDURE — 45380 COLONOSCOPY AND BIOPSY: CPT | Performed by: INTERNAL MEDICINE

## 2021-04-07 PROCEDURE — 2500000003 HC RX 250 WO HCPCS: Performed by: ANESTHESIOLOGY

## 2021-04-07 PROCEDURE — 7100000030 HC ASPR PHASE II RECOVERY - FIRST 15 MIN: Performed by: INTERNAL MEDICINE

## 2021-04-07 PROCEDURE — 3609010300 HC COLONOSCOPY W/BIOPSY SINGLE/MULTIPLE: Performed by: INTERNAL MEDICINE

## 2021-04-07 PROCEDURE — 81025 URINE PREGNANCY TEST: CPT

## 2021-04-07 PROCEDURE — 7100000001 HC PACU RECOVERY - ADDTL 15 MIN: Performed by: INTERNAL MEDICINE

## 2021-04-07 PROCEDURE — 2580000003 HC RX 258: Performed by: ANESTHESIOLOGY

## 2021-04-07 PROCEDURE — 2709999900 HC NON-CHARGEABLE SUPPLY: Performed by: INTERNAL MEDICINE

## 2021-04-07 PROCEDURE — 7100000031 HC ASPR PHASE II RECOVERY - ADDTL 15 MIN: Performed by: INTERNAL MEDICINE

## 2021-04-07 PROCEDURE — 7100000011 HC PHASE II RECOVERY - ADDTL 15 MIN: Performed by: INTERNAL MEDICINE

## 2021-04-07 PROCEDURE — 7100000000 HC PACU RECOVERY - FIRST 15 MIN: Performed by: INTERNAL MEDICINE

## 2021-04-07 PROCEDURE — 88305 TISSUE EXAM BY PATHOLOGIST: CPT

## 2021-04-07 PROCEDURE — 7100000010 HC PHASE II RECOVERY - FIRST 15 MIN: Performed by: INTERNAL MEDICINE

## 2021-04-07 PROCEDURE — 3700000001 HC ADD 15 MINUTES (ANESTHESIA): Performed by: INTERNAL MEDICINE

## 2021-04-07 RX ORDER — SODIUM CHLORIDE 0.9 % (FLUSH) 0.9 %
10 SYRINGE (ML) INJECTION PRN
Status: DISCONTINUED | OUTPATIENT
Start: 2021-04-07 | End: 2021-04-07 | Stop reason: HOSPADM

## 2021-04-07 RX ORDER — SODIUM CHLORIDE 0.9 % (FLUSH) 0.9 %
10 SYRINGE (ML) INJECTION EVERY 12 HOURS SCHEDULED
Status: DISCONTINUED | OUTPATIENT
Start: 2021-04-07 | End: 2021-04-07 | Stop reason: HOSPADM

## 2021-04-07 RX ORDER — SODIUM CHLORIDE, SODIUM LACTATE, POTASSIUM CHLORIDE, CALCIUM CHLORIDE 600; 310; 30; 20 MG/100ML; MG/100ML; MG/100ML; MG/100ML
INJECTION, SOLUTION INTRAVENOUS CONTINUOUS
Status: DISCONTINUED | OUTPATIENT
Start: 2021-04-07 | End: 2021-04-07 | Stop reason: HOSPADM

## 2021-04-07 RX ORDER — PROPOFOL 10 MG/ML
INJECTION, EMULSION INTRAVENOUS CONTINUOUS PRN
Status: DISCONTINUED | OUTPATIENT
Start: 2021-04-07 | End: 2021-04-07

## 2021-04-07 RX ORDER — PROPOFOL 10 MG/ML
INJECTION, EMULSION INTRAVENOUS PRN
Status: DISCONTINUED | OUTPATIENT
Start: 2021-04-07 | End: 2021-04-07 | Stop reason: SDUPTHER

## 2021-04-07 RX ORDER — PROPOFOL 10 MG/ML
INJECTION, EMULSION INTRAVENOUS CONTINUOUS PRN
Status: DISCONTINUED | OUTPATIENT
Start: 2021-04-07 | End: 2021-04-07 | Stop reason: SDUPTHER

## 2021-04-07 RX ORDER — LIDOCAINE HYDROCHLORIDE 10 MG/ML
1 INJECTION, SOLUTION EPIDURAL; INFILTRATION; INTRACAUDAL; PERINEURAL
Status: COMPLETED | OUTPATIENT
Start: 2021-04-07 | End: 2021-04-07

## 2021-04-07 RX ADMIN — PROPOFOL 125 MCG/KG/MIN: 10 INJECTION, EMULSION INTRAVENOUS at 10:15

## 2021-04-07 RX ADMIN — PROPOFOL 100 MG: 10 INJECTION, EMULSION INTRAVENOUS at 10:15

## 2021-04-07 RX ADMIN — SODIUM CHLORIDE, POTASSIUM CHLORIDE, SODIUM LACTATE AND CALCIUM CHLORIDE: 600; 310; 30; 20 INJECTION, SOLUTION INTRAVENOUS at 10:02

## 2021-04-07 RX ADMIN — PROPOFOL 100 MG: 10 INJECTION, EMULSION INTRAVENOUS at 10:19

## 2021-04-07 RX ADMIN — PROPOFOL 60 MG: 10 INJECTION, EMULSION INTRAVENOUS at 10:34

## 2021-04-07 RX ADMIN — LIDOCAINE HYDROCHLORIDE 1 ML: 10 INJECTION, SOLUTION EPIDURAL; INFILTRATION; INTRACAUDAL; PERINEURAL at 10:00

## 2021-04-07 RX ADMIN — PROPOFOL 80 MG: 10 INJECTION, EMULSION INTRAVENOUS at 10:37

## 2021-04-07 ASSESSMENT — PULMONARY FUNCTION TESTS
PIF_VALUE: 1
PIF_VALUE: 0
PIF_VALUE: 1
PIF_VALUE: 0
PIF_VALUE: 1
PIF_VALUE: 0
PIF_VALUE: 1
PIF_VALUE: 0

## 2021-04-07 ASSESSMENT — PAIN SCALES - GENERAL
PAINLEVEL_OUTOF10: 0

## 2021-04-07 ASSESSMENT — LIFESTYLE VARIABLES: SMOKING_STATUS: 1

## 2021-04-07 NOTE — H&P
HISTORY and [address]       NAME:  Chandan Turner  MRN: [id number]   YOB: 1995   Date: 4/7/2021   Age: 27 y.o. Gender: female       COMPLAINT AND PRESENT HISTORY:     Chandan Turner is 27 y.o. female, having a diagnostic colonoscopy. Pt has not had a previous colonoscopy. History of chronic diarrhea that has been ongoing intermittently for years. Reports diarrhea worse after cholecystectomy in June, 2020. Pt reports an average of 8-10 stools per day. Describes stools as watery. Pt reports RUQ abdominal pain that she describes as constant. She reports she is unable to lift, lay on her right side. Pt reports constant nausea with associated vomiting. History of hemorrhoids. She reports she had BRBPR yesterday while taking prep but not before or since. Denies constipation or melena. Pt has a positive Family Hx of cancer in her maternal great grandfather. Completed prep but reports she did vomit during prep. Reports stools are clear. NPO. No medications taken this am. Denies taking any blood thinning medications. Denies recent or current chest pain/pressure, palpitations, SOB, recent URI, fever or chills.      PAST MEDICAL HISTORY     Past Medical History:   Diagnosis Date    Anxiety     Asthma     Chronic diarrhea     Depression     Headache     IBS (irritable bowel syndrome)     Nausea        SURGICAL HISTORY       Past Surgical History:   Procedure Laterality Date    CHOLECYSTECTOMY, LAPAROSCOPIC N/A 6/22/2020    CHOLECYSTECTOMY LAPAROSCOPIC performed by Justen Simental MD at [address] [place] [address], [address]  06/2020    TONGUE SURGERY      WISDOM TOOTH EXTRACTION         FAMILY HISTORY       Family History   Problem Relation Age of Onset    Diabetes Father     Coronary Art Dis Maternal Grandmother     Colon Cancer Maternal Grandfather 27    No Known Problems Mother        SOCIAL HISTORY       Social History     Socioeconomic History    Marital status: Single     Spouse name: Not on file    Number of children: 0    Years of education: Not on file    Highest education level: High school graduate   Occupational History    Occupation: [email or web address]   Social Needs    Financial resource strain: Not on file    Food insecurity     Worry: Not on file     Inability: Not on file   [employer] needs     Medical: Not on file     Non-medical: Not on file   Tobacco Use    Smoking status: Current Every Day Smoker     Packs/day: 1.00     Years: 10.00     Pack years: 10.00     Types: Cigarettes     Start date: 2006    Smokeless tobacco: Never Used   Substance and Sexual Activity    Alcohol use: Never     Frequency: Never    Drug use: Yes     Types: Marijuana    Sexual activity: Yes     Partners: Female   Lifestyle    Physical activity     Days per week: Not on file     Minutes per session: Not on file    Stress: Not on file   Relationships    Social connections     Talks on phone: Not on file     Gets together: Not on file     Attends Episcopalian service: Not on file     Active member of club or organization: Not on file     Attends meetings of clubs or organizations: Not on file     Relationship status: Not on file    Intimate partner violence     Fear of current or ex partner: Not on file     Emotionally abused: Not on file     Physically abused: Not on file     Forced sexual activity: Not on file   Other Topics Concern    Not on file   Social History Narrative    Lives with fiance         REVIEW OF SYSTEMS      Allergies   Allergen Reactions    Gentamicin     Hydrocodone Nausea Only       No current facility-administered medications on file prior to encounter.       Current Outpatient Medications on File Prior to Encounter   Medication Sig Dispense Refill    busPIRone (BUSPAR) 5 MG tablet Take 7.5 mg by mouth 3 times daily      FLUoxetine (PROZAC) 10 MG tablet Take 10 mg by mouth daily      albuterol sulfate  (90 Base) MCG/ACT inhaler Inhale 2 puffs into the lungs every 4 hours as needed for Wheezing 1 Inhaler 0    dicyclomine (BENTYL) 10 MG capsule Take 1 capsule by mouth every 6 hours as needed (cramps) 20 capsule 0    ondansetron (ZOFRAN) 4 MG tablet Take 1 tablet by mouth 3 times daily as needed for Nausea or Vomiting 15 tablet 0   Notation: Above medications are not currently reconciled at time of signing this H&P note, to be reconciled in pre-op per RN. Negative except for what is mentioned in the HPI. GENERAL PHYSICAL EXAM     Vitals: Review vitals per RN flowsheet. GENERAL APPEARANCE:   Riley Alfaro is 27 y.o. female, nourished, conscious, alert. Does not appear to be in distress or pain at this time. SKIN:  Warm, dry, no cyanosis or jaundice. HEAD:  Normocephalic, atraumatic. EYES:  Pupils equal, reactive to light. EARS:  No discharge, no marked hearing loss. NOSE:  No rhinorrhea, epistaxis or septal deformity. THROAT:  Not congested. No ulceration bleeding or discharge. NECK:  No stiffness, trachea central.  No palpable masses or L.N.                 CHEST:  Symmetrical and equal on expansion. HEART:  RRR S1 > S2. No audible murmurs or gallops. LUNGS:  Equal on expansion, normal breath sounds. No wheezing, rhonchi or rales. ABDOMEN:  Soft on palpation. No localized tenderness. No guarding or rigidity. LOCOMOTOR, BACK AND SPINE:  No tenderness or deformities. EXTREMITIES:  Symmetrical, no pretibial edema. No calf tenderness. No discoloration or ulcerations. NEUROLOGIC:  The patient is conscious, alert, oriented. No facial drooping. Speech clear. No apparent focal sensory or motor deficits.              PROVISIONAL DIAGNOSES / SURGERY:      CHRONIC DIARRHEA RIGHT UPPER QUADRANT PAIN    COLONOSCOPY DIAGNOSTIC    Patient Active Problem List Diagnosis Date Noted    S/P laparoscopic cholecystectomy 06/23/2020    Class 2 obesity due to excess calories without serious comorbidity in adult 06/22/2020    Tobacco abuse 06/22/2020    Marijuana abuse 06/22/2020    IBS (irritable bowel syndrome)     Headache     Depression     Asthma     Anxiety            TOYA Suarez - CNP on 4/7/2021 at 9:35 AM

## 2021-04-07 NOTE — OP NOTE
[facility]     PROCEDURE DATE: 04/07/21    REFERRING PHYSICIAN: No ref. provider found     PRIMARY CARE PROVIDER: Ruiz Richmond MD    ATTENDING PHYSICIAN: Marla Ferrara MD     HISTORY: Ms. Abhishek Plunkett is a 27 y.o. female who presents to the [place] [address] Endoscopy unit for colonoscopy. The patient's clinical history is remarkable for chronic diarrhea. She is currently medically stable and appropriate for the planned procedure. PREOPERATIVE DIAGNOSIS: Chronic diarrhea. PROCEDURES:   Transanal Colonoscopy with biopsy. POSTPROCEDURE DIAGNOSIS:  Aphthoid erosions in terminal ileum    SPECIMENS: Biopsy    MEDICATIONS:     MAC per anesthesia    EBL: minimal    INSTRUMENT: Olympus PCF-H190 AL flexible Colonoscope. PREPARATION: The nature and character of the procedure as well as risks, benefits, and alternatives were discussed with the patient and informed consent was obtained. Complications were said to include, but were not limited to: medication allergy, medication reaction, cardiovascular and respiratory problems, bleeding, perforation, infection, and/or missed diagnosis. Following arrival in the endoscopy room, the patient was placed in the left lateral decubitus position and final time-out accomplished in the presence of the nursing staff. Baseline vital signs were obtained and reviewed, and IV sedation was subsequently initiated. FINDINGS: Rectal examination demonstrated no significant visible external abnormality and digital palpation was unremarkable. Following adequate conscious sedation the colonoscope was introduced and advanced under direct visualization to the terminal ileum. The bowel preparation was felt to be suboptimal to fair. This included moderate amounts of thick stool that was not able to be adequately irrigated and aspirated. Cecal intubation time was 3 minutes.      Once maximally inserted, the endoscope was withdrawn and the mucosa was carefully inspected. The mucosal exam was normal in the colon. A few aphthoid erosions were noted in terminal ileum. Biopsies were obtained. Retroflexion was performed in the rectum and showed no pathology. RECOMMENDATIONS:   1) Follow up with referring provider, as previously scheduled. 2) Follow up on pathology report. 3) CT enterography. 4) Follow up with me in office in 4 weeks.         Norberto Barnett Harsh

## 2021-04-07 NOTE — ANESTHESIA PRE PROCEDURE
Hydrocodone Nausea Only       Problem List:    Patient Active Problem List   Diagnosis Code    IBS (irritable bowel syndrome) K58.9    Headache R51.9    Depression F32.9    Asthma J45.909    Anxiety F41.9    Class 2 obesity due to excess calories without serious comorbidity in adult E66.09    Tobacco abuse Z72.0    Marijuana abuse F12.10    S/P laparoscopic cholecystectomy Z90.49       Past Medical History:        Diagnosis Date    Anxiety     Asthma     Chronic diarrhea     Depression     Headache     IBS (irritable bowel syndrome)     Nausea        Past Surgical History:        Procedure Laterality Date    CHOLECYSTECTOMY, LAPAROSCOPIC N/A 6/22/2020    CHOLECYSTECTOMY LAPAROSCOPIC performed by Brigitte Garcia MD at [address] [place] [address], [address]  06/2020    TONGUE SURGERY      WISDOM TOOTH EXTRACTION         Social History:    Social History     Tobacco Use    Smoking status: Current Every Day Smoker     Packs/day: 1.00     Years: 10.00     Pack years: 10.00     Types: Cigarettes     Start date: 2006    Smokeless tobacco: Never Used   Substance Use Topics    Alcohol use: Never     Frequency: Never                                Ready to quit: Not Answered  Counseling given: Not Answered      Vital Signs (Current): There were no vitals filed for this visit.                                            BP Readings from Last 3 Encounters:   04/07/21 110/60   03/10/21 126/78   03/03/21 113/67       NPO Status:                                                                                 BMI:   Wt Readings from Last 3 Encounters:   04/07/21 218 lb (98.9 kg)   03/24/21 218 lb (98.9 kg)   03/10/21 215 lb (97.5 kg)     There is no height or weight on file to calculate BMI.    CBC:   Lab Results   Component Value Date    WBC 10.5 10/20/2020    RBC 4.85 10/20/2020    HGB 15.0 10/20/2020    HCT 45.3 10/20/2020    MCV 93.5 10/20/2020    RDW 13.7 10/20/2020     10/20/2020       CMP:   Lab Results   Component Value Date     10/20/2020    K 4.8 10/20/2020     10/20/2020    CO2 22 10/20/2020    BUN 17 10/20/2020    CREATININE 0.82 10/20/2020    GFRAA >60 10/20/2020    LABGLOM >60 10/20/2020    GLUCOSE 84 10/20/2020    PROT 7.6 10/20/2020    CALCIUM 9.0 10/20/2020    BILITOT <0.15 10/20/2020    ALKPHOS 77 10/20/2020    AST 18 10/20/2020    ALT 18 10/20/2020       POC Tests: No results for input(s): POCGLU, POCNA, POCK, POCCL, POCBUN, POCHEMO, POCHCT in the last 72 hours. Coags: No results found for: PROTIME, INR, APTT    HCG (If Applicable):   Lab Results   Component Value Date    PREGTESTUR NEGATIVE 10/01/2020        ABGs: No results found for: PHART, PO2ART, PHR3DQV, HCY1NLT, BEART, C1AKOTII     Type & Screen (If Applicable):  No results found for: LABABO, LABRH    Drug/Infectious Status (If Applicable):  No results found for: HIV, HEPCAB    COVID-19 Screening (If Applicable):   Lab Results   Component Value Date    COVID19 Not Detected 04/03/2021    COVID19 Not Detected 10/23/2020         Anesthesia Evaluation  Patient summary reviewed and Nursing notes reviewed no history of anesthetic complications:   Airway: Mallampati: III  TM distance: >3 FB   Neck ROM: full  Mouth opening: > = 3 FB Dental:      Comment: Many missing teeth    Pulmonary:normal exam  breath sounds clear to auscultation  (+) asthma: current smoker (marijuana and tobacco)                           Cardiovascular:Negative CV ROS  Exercise tolerance: good (>4 METS),           Rhythm: regular  Rate: normal                    Neuro/Psych:   (+) headaches:, psychiatric history (panic attacks):depression/anxiety             GI/Hepatic/Renal:   (+) morbid obesity          Endo/Other:                     Abdominal:           Vascular:                                          Anesthesia Plan      MAC     ASA 3       Induction: intravenous. MIPS: Prophylactic antiemetics administered.   Anesthetic plan and risks discussed with patient. Plan discussed with CRNA.                   Tracie Carrel, MD   4/7/2021

## 2021-04-08 ENCOUNTER — TELEPHONE (OUTPATIENT)
Dept: GASTROENTEROLOGY | Age: 26
End: 2021-04-08

## 2021-04-08 LAB — SURGICAL PATHOLOGY REPORT: NORMAL

## 2021-04-22 ENCOUNTER — HOSPITAL ENCOUNTER (OUTPATIENT)
Dept: CT IMAGING | Age: 26
Discharge: HOME | End: 2021-04-24
Payer: COMMERCIAL

## 2021-04-22 DIAGNOSIS — K52.9 ILEITIS: ICD-10-CM

## 2021-04-22 PROCEDURE — 74176 CT ABD & PELVIS W/O CONTRAST: CPT

## 2021-04-22 PROCEDURE — 2500000003 HC RX 250 WO HCPCS: Performed by: INTERNAL MEDICINE

## 2021-04-22 PROCEDURE — 2580000003 HC RX 258: Performed by: INTERNAL MEDICINE

## 2021-04-22 RX ORDER — 0.9 % SODIUM CHLORIDE 0.9 %
80 INTRAVENOUS SOLUTION INTRAVENOUS ONCE
Status: DISCONTINUED | OUTPATIENT
Start: 2021-04-22 | End: 2021-04-22

## 2021-04-22 RX ORDER — SODIUM CHLORIDE 0.9 % (FLUSH) 0.9 %
10 SYRINGE (ML) INJECTION PRN
Status: DISCONTINUED | OUTPATIENT
Start: 2021-04-22 | End: 2021-04-25 | Stop reason: HOSPADM

## 2021-04-22 RX ADMIN — BARIUM SULFATE 1350 ML: 1 SUSPENSION ORAL at 10:38

## 2021-04-22 RX ADMIN — SODIUM CHLORIDE, PRESERVATIVE FREE 10 ML: 5 INJECTION INTRAVENOUS at 10:38

## 2021-04-23 ENCOUNTER — OFFICE VISIT (OUTPATIENT)
Dept: GASTROENTEROLOGY | Age: 26
End: 2021-04-23
Payer: COMMERCIAL

## 2021-04-23 VITALS
SYSTOLIC BLOOD PRESSURE: 118 MMHG | BODY MASS INDEX: 38.04 KG/M2 | DIASTOLIC BLOOD PRESSURE: 75 MMHG | WEIGHT: 208 LBS | HEART RATE: 60 BPM

## 2021-04-23 DIAGNOSIS — K52.9 CHRONIC DIARRHEA: Primary | ICD-10-CM

## 2021-04-23 PROCEDURE — G8417 CALC BMI ABV UP PARAM F/U: HCPCS | Performed by: INTERNAL MEDICINE

## 2021-04-23 PROCEDURE — 4004F PT TOBACCO SCREEN RCVD TLK: CPT | Performed by: INTERNAL MEDICINE

## 2021-04-23 PROCEDURE — G8427 DOCREV CUR MEDS BY ELIG CLIN: HCPCS | Performed by: INTERNAL MEDICINE

## 2021-04-23 PROCEDURE — 99213 OFFICE O/P EST LOW 20 MIN: CPT | Performed by: INTERNAL MEDICINE

## 2021-04-23 ASSESSMENT — ENCOUNTER SYMPTOMS
SINUS PRESSURE: 0
ALLERGIC/IMMUNOLOGIC NEGATIVE: 1
CHOKING: 0
ABDOMINAL DISTENTION: 1
RESPIRATORY NEGATIVE: 1
COUGH: 0
DIARRHEA: 0
WHEEZING: 0
VOICE CHANGE: 0
VOMITING: 0
BACK PAIN: 1
CONSTIPATION: 1
TROUBLE SWALLOWING: 0
ABDOMINAL PAIN: 1
BLOOD IN STOOL: 0
RECTAL PAIN: 1
NAUSEA: 1
ANAL BLEEDING: 0
SORE THROAT: 0

## 2021-04-23 NOTE — PROGRESS NOTES
GI CLINIC FOLLOW UP    INTERVAL HISTORY:   No referring provider defined for this encounter. Chief Complaint   Patient presents with    Abdominal Pain     Colonoscopy 04/07/2021 CT Scan yesterday unable to use the contrast. she got very dehydrated from the barium they did get in and gave her diarrhea. HISTORY OF PRESENT ILLNESS: Ms.Brittany Amber Dockery is a 27 y.o. female with diarrhea. Some cramping. Urgency. No blood in stool or melena. She has not started taking cholestyramine. Past Medical,Family, and Social History reviewed and does not contribute to the patient presentingcondition. Patient's PMH/PSH,SH,PSYCH Hx, MEDs, ALLERGIES, and ROS were all reviewed and updated in the appropriate sections.     PAST MEDICAL HISTORY:  Past Medical History:   Diagnosis Date    Anxiety     Asthma     Chronic diarrhea     Depression     Headache     IBS (irritable bowel syndrome)     Nausea        Past Surgical History:   Procedure Laterality Date    CHOLECYSTECTOMY, LAPAROSCOPIC N/A 6/22/2020    CHOLECYSTECTOMY LAPAROSCOPIC performed by Heriberto Pyle MD at [address] [place] [address], [address]  06/2020    COLONOSCOPY N/A 4/7/2021    COLONOSCOPY WITH BIOPSY performed by Debora Valenzuela MD at [address] [place] [address] WISDOM TOOTH EXTRACTION         CURRENT MEDICATIONS:    Current Outpatient Medications:     cholestyramine (QUESTRAN) 4 g packet, Take 1 packet by mouth 3 times daily (with meals), Disp: 90 packet, Rfl: 3    famotidine (PEPCID) 20 MG tablet, Take 20 mg by mouth 2 times daily, Disp: , Rfl:     busPIRone (BUSPAR) 5 MG tablet, Take 7.5 mg by mouth 3 times daily, Disp: , Rfl:     FLUoxetine (PROZAC) 10 MG tablet, Take 10 mg by mouth daily, Disp: , Rfl:     albuterol sulfate  (90 Base) MCG/ACT inhaler, Inhale 2 puffs into the lungs every 4 hours as needed for Wheezing, Disp: 1 Inhaler, Rfl: 0    dicyclomine (BENTYL) 10 MG capsule, Take 1 capsule by mouth every 6 hours as needed (cramps), Disp: 20 capsule, Rfl: 0    ondansetron (ZOFRAN) 4 MG tablet, Take 1 tablet by mouth 3 times daily as needed for Nausea or Vomiting, Disp: 15 tablet, Rfl: 0    ALLERGIES:   Allergies   Allergen Reactions    Gentamicin     Hydrocodone Nausea Only       FAMILY HISTORY:       Problem Relation Age of Onset    Diabetes Father     Coronary Art Dis Maternal Grandmother     Colon Cancer Maternal Grandfather 27    No Known Problems Mother          SOCIAL HISTORY:   Social History     Socioeconomic History    Marital status: Single     Spouse name: Not on file    Number of children: 0    Years of education: Not on file    Highest education level: High school graduate   Occupational History    Occupation: cook   Social Needs    Financial resource strain: Not on file    Food insecurity     Worry: Not on file     Inability: Not on file   [employer] needs     Medical: Not on file     Non-medical: Not on file   Tobacco Use    Smoking status: Current Every Day Smoker     Packs/day: 1.00     Years: 10.00     Pack years: 10.00     Types: Cigarettes     Start date: 2006    Smokeless tobacco: Never Used   Substance and Sexual Activity    Alcohol use: Never     Frequency: Never    Drug use: Yes     Types: Marijuana    Sexual activity: Yes     Partners: Female   Lifestyle    Physical activity     Days per week: Not on file     Minutes per session: Not on file    Stress: Not on file   Relationships    Social connections     Talks on phone: Not on file     Gets together: Not on file     Attends Shinto service: Not on file     Active member of club or organization: Not on file     Attends meetings of clubs or organizations: Not on file     Relationship status: Not on file    Intimate partner violence     Fear of current or ex partner: Not on file     Emotionally abused: Not on file     Physically abused: Not on file     Forced sexual activity: Not on file   Other Topics Concern    Not on file   Social History Narrative    Lives with maggi       REVIEW OF SYSTEMS: A 12-point review of systemswas obtained and pertinent positives and negatives were enumerated above in the history of present illness. All other reviewed systems / symptoms were negative. Review of Systems   Constitutional: Positive for appetite change and fatigue. Negative for unexpected weight change. HENT: Negative. Negative for dental problem, postnasal drip, sinus pressure, sore throat, trouble swallowing and voice change. Eyes: Positive for visual disturbance. Respiratory: Negative. Negative for cough, choking and wheezing. Cardiovascular: Negative. Negative for chest pain, palpitations and leg swelling. Gastrointestinal: Positive for abdominal distention, abdominal pain, constipation, nausea and rectal pain (Hemorrhoid). Negative for anal bleeding, blood in stool, diarrhea and vomiting. Endocrine: Negative. Genitourinary: Negative. Negative for difficulty urinating. Musculoskeletal: Positive for back pain. Negative for arthralgias, gait problem and myalgias. Allergic/Immunologic: Negative. Negative for environmental allergies and food allergies. Neurological: Negative. Negative for dizziness, weakness, light-headedness, numbness and headaches. Hematological: Does not bruise/bleed easily. Psychiatric/Behavioral: Positive for sleep disturbance. The patient is nervous/anxious.             LABORATORY DATA: Reviewed  Lab Results   Component Value Date    WBC 10.5 10/20/2020    HGB 15.0 10/20/2020    HCT 45.3 10/20/2020    MCV 93.5 10/20/2020     10/20/2020     10/20/2020    K 4.8 10/20/2020     10/20/2020    CO2 22 10/20/2020    BUN 17 10/20/2020    CREATININE 0.82 10/20/2020    LABALBU 4.4 10/20/2020    BILITOT <0.15 (L) 10/20/2020    ALKPHOS 77 10/20/2020    AST 18 10/20/2020    ALT 18 10/20/2020         Lab Results   Component Value Date    RBC 4.85 10/20/2020    HGB 15.0 10/20/2020    MCV 93.5 10/20/2020    MCH 30.9 10/20/2020    MCHC 33.1 10/20/2020    RDW 13.7 10/20/2020    MPV 8.0 10/20/2020    BASOPCT 1 10/20/2020    LYMPHSABS 2.70 10/20/2020    MONOSABS 0.50 10/20/2020    NEUTROABS 7.10 10/20/2020    EOSABS 0.20 10/20/2020    BASOSABS 0.10 10/20/2020         DIAGNOSTIC TESTING:     Ct Abdomen Pelvis Wo Contrast Additional Contrast? Oral    Result Date: 4/22/2021  EXAMINATION: CT OF THE ABDOMEN AND PELVIS WITHOUT CONTRAST 4/22/2021 10:30 am TECHNIQUE: CT of the abdomen and pelvis was performed without the administration of intravenous contrast. Multiplanar reformatted images are provided for review. Dose modulation, iterative reconstruction, and/or weight based adjustment of the mA/kV was utilized to reduce the radiation dose to as low as reasonably achievable. COMPARISON: 10/08/2020 and 06/22/2020 HISTORY: Reason for Exam: Ileitis Acuity: Chronic Type of Exam: Subsequent/Follow-up Additional signs and symptoms: Pt c/o abdominal pain and diarrhea for almost a year Relevant Medical/Surgical History: Hx cholecystectomy, colonsoscopy; No hx diabetes FINDINGS: Lower Chest: Normal heart size. Lung bases demonstrate scattered small hazy/ground-glass subpleural opacities. KIDNEYS AND URINARY TRACT: No renal calculi are identified. No evidence for hydronephrosis. The ureters are of normal course and caliber. ORGANS: Lack of intravenous contrast limits evaluation of the solid organs and bowel. The liver, spleen, pancreas and adrenal glands appear grossly unremarkable. Cholecystectomy GI/BOWEL: No bowel obstruction or inflammation. Normal appendix. PELVIS: The bladder and pelvic organs are unremarkable. Mild amount of free fluid cul-de-sac likely physiologic in nature. PERITONEUM/RETROPERITONEUM: No lymphadenopathy is noted. Normal caliber aorta. No ascites or free air. BONES/SOFT TISSUES: The osseous structures demonstrate no significant abnormality.      *Unremarkable appearance

## 2021-04-24 ENCOUNTER — HOSPITAL ENCOUNTER (EMERGENCY)
Age: 26
Discharge: HOME | End: 2021-04-24
Attending: EMERGENCY MEDICINE
Payer: COMMERCIAL

## 2021-04-24 ENCOUNTER — APPOINTMENT (OUTPATIENT)
Dept: ULTRASOUND IMAGING | Age: 26
End: 2021-04-24
Payer: COMMERCIAL

## 2021-04-24 VITALS
HEART RATE: 54 BPM | RESPIRATION RATE: 16 BRPM | TEMPERATURE: 97.9 F | WEIGHT: 208 LBS | DIASTOLIC BLOOD PRESSURE: 68 MMHG | OXYGEN SATURATION: 97 % | BODY MASS INDEX: 38.28 KG/M2 | HEIGHT: 62 IN | SYSTOLIC BLOOD PRESSURE: 113 MMHG

## 2021-04-24 DIAGNOSIS — N93.8 DYSFUNCTIONAL UTERINE BLEEDING: Primary | ICD-10-CM

## 2021-04-24 LAB
-: ABNORMAL
ABSOLUTE EOS #: 0.1 K/UL (ref 0–0.4)
ABSOLUTE IMMATURE GRANULOCYTE: ABNORMAL K/UL (ref 0–0.3)
ABSOLUTE LYMPH #: 1.8 K/UL (ref 1–4.8)
ABSOLUTE MONO #: 0.6 K/UL (ref 0.1–1.3)
AMORPHOUS: ABNORMAL
ANION GAP SERPL CALCULATED.3IONS-SCNC: 13 MMOL/L (ref 9–17)
BACTERIA: ABNORMAL
BASOPHILS # BLD: 0 % (ref 0–2)
BASOPHILS ABSOLUTE: 0 K/UL (ref 0–0.2)
BILIRUBIN URINE: ABNORMAL
BUN BLDV-MCNC: 15 MG/DL (ref 6–20)
BUN/CREAT BLD: ABNORMAL (ref 9–20)
CALCIUM SERPL-MCNC: 9.1 MG/DL (ref 8.6–10.4)
CASTS UA: ABNORMAL /LPF
CHLORIDE BLD-SCNC: 105 MMOL/L (ref 98–107)
CO2: 21 MMOL/L (ref 20–31)
COLOR: ABNORMAL
COMMENT UA: ABNORMAL
CREAT SERPL-MCNC: 0.91 MG/DL (ref 0.5–0.9)
CRYSTALS, UA: ABNORMAL /HPF
DIFFERENTIAL TYPE: ABNORMAL
EOSINOPHILS RELATIVE PERCENT: 1 % (ref 0–4)
EPITHELIAL CELLS UA: ABNORMAL /HPF
GFR AFRICAN AMERICAN: >60 ML/MIN
GFR NON-AFRICAN AMERICAN: >60 ML/MIN
GFR SERPL CREATININE-BSD FRML MDRD: ABNORMAL ML/MIN/{1.73_M2}
GFR SERPL CREATININE-BSD FRML MDRD: ABNORMAL ML/MIN/{1.73_M2}
GLUCOSE BLD-MCNC: 94 MG/DL (ref 70–99)
GLUCOSE URINE: NEGATIVE
HCG QUALITATIVE: NEGATIVE
HCT VFR BLD CALC: 43.4 % (ref 36–46)
HEMOGLOBIN: 14.5 G/DL (ref 12–16)
IMMATURE GRANULOCYTES: ABNORMAL %
KETONES, URINE: ABNORMAL
LEUKOCYTE ESTERASE, URINE: NEGATIVE
LYMPHOCYTES # BLD: 15 % (ref 24–44)
MCH RBC QN AUTO: 30.6 PG (ref 26–34)
MCHC RBC AUTO-ENTMCNC: 33.4 G/DL (ref 31–37)
MCV RBC AUTO: 91.5 FL (ref 80–100)
MONOCYTES # BLD: 5 % (ref 1–7)
MUCUS: ABNORMAL
NITRITE, URINE: NEGATIVE
NRBC AUTOMATED: ABNORMAL PER 100 WBC
OTHER OBSERVATIONS UA: ABNORMAL
PDW BLD-RTO: 13.5 % (ref 11.5–14.9)
PH UA: 5.5 (ref 5–8)
PLATELET # BLD: 296 K/UL (ref 150–450)
PLATELET ESTIMATE: ABNORMAL
PMV BLD AUTO: 7.8 FL (ref 6–12)
POTASSIUM SERPL-SCNC: 3.9 MMOL/L (ref 3.7–5.3)
PROTEIN UA: ABNORMAL
RBC # BLD: 4.74 M/UL (ref 4–5.2)
RBC # BLD: ABNORMAL 10*6/UL
RBC UA: ABNORMAL /HPF
RENAL EPITHELIAL, UA: ABNORMAL /HPF
SEG NEUTROPHILS: 79 % (ref 36–66)
SEGMENTED NEUTROPHILS ABSOLUTE COUNT: 9.3 K/UL (ref 1.3–9.1)
SODIUM BLD-SCNC: 139 MMOL/L (ref 135–144)
SPECIFIC GRAVITY UA: 1.05 (ref 1–1.03)
TRICHOMONAS: ABNORMAL
TURBIDITY: ABNORMAL
URINE HGB: NEGATIVE
UROBILINOGEN, URINE: NORMAL
WBC # BLD: 11.9 K/UL (ref 3.5–11)
WBC # BLD: ABNORMAL 10*3/UL
WBC UA: ABNORMAL /HPF
YEAST: ABNORMAL

## 2021-04-24 PROCEDURE — 36415 COLL VENOUS BLD VENIPUNCTURE: CPT

## 2021-04-24 PROCEDURE — 6360000002 HC RX W HCPCS: Performed by: EMERGENCY MEDICINE

## 2021-04-24 PROCEDURE — 85025 COMPLETE CBC W/AUTO DIFF WBC: CPT

## 2021-04-24 PROCEDURE — 80048 BASIC METABOLIC PNL TOTAL CA: CPT

## 2021-04-24 PROCEDURE — 76830 TRANSVAGINAL US NON-OB: CPT

## 2021-04-24 PROCEDURE — 96375 TX/PRO/DX INJ NEW DRUG ADDON: CPT

## 2021-04-24 PROCEDURE — 99284 EMERGENCY DEPT VISIT MOD MDM: CPT

## 2021-04-24 PROCEDURE — 2580000003 HC RX 258: Performed by: EMERGENCY MEDICINE

## 2021-04-24 PROCEDURE — 96374 THER/PROPH/DIAG INJ IV PUSH: CPT

## 2021-04-24 PROCEDURE — 81001 URINALYSIS AUTO W/SCOPE: CPT

## 2021-04-24 PROCEDURE — 93976 VASCULAR STUDY: CPT

## 2021-04-24 PROCEDURE — 84703 CHORIONIC GONADOTROPIN ASSAY: CPT

## 2021-04-24 RX ORDER — ONDANSETRON 2 MG/ML
4 INJECTION INTRAMUSCULAR; INTRAVENOUS ONCE
Status: COMPLETED | OUTPATIENT
Start: 2021-04-24 | End: 2021-04-24

## 2021-04-24 RX ORDER — 0.9 % SODIUM CHLORIDE 0.9 %
1000 INTRAVENOUS SOLUTION INTRAVENOUS ONCE
Status: COMPLETED | OUTPATIENT
Start: 2021-04-24 | End: 2021-04-24

## 2021-04-24 RX ORDER — KETOROLAC TROMETHAMINE 30 MG/ML
30 INJECTION, SOLUTION INTRAMUSCULAR; INTRAVENOUS ONCE
Status: COMPLETED | OUTPATIENT
Start: 2021-04-24 | End: 2021-04-24

## 2021-04-24 RX ADMIN — ONDANSETRON 4 MG: 2 INJECTION INTRAMUSCULAR; INTRAVENOUS at 14:54

## 2021-04-24 RX ADMIN — SODIUM CHLORIDE 1000 ML: 9 INJECTION, SOLUTION INTRAVENOUS at 14:54

## 2021-04-24 RX ADMIN — KETOROLAC TROMETHAMINE 30 MG: 30 INJECTION, SOLUTION INTRAMUSCULAR; INTRAVENOUS at 14:55

## 2021-04-24 ASSESSMENT — ENCOUNTER SYMPTOMS
SHORTNESS OF BREATH: 0
COLOR CHANGE: 0
DIARRHEA: 1
SORE THROAT: 0
COUGH: 0
ABDOMINAL PAIN: 1
VOMITING: 0
BACK PAIN: 0
BLOOD IN STOOL: 0
CONSTIPATION: 0
NAUSEA: 1
TROUBLE SWALLOWING: 0

## 2021-04-24 ASSESSMENT — PAIN SCALES - GENERAL
PAINLEVEL_OUTOF10: 10
PAINLEVEL_OUTOF10: 10
PAINLEVEL_OUTOF10: 7

## 2021-04-24 NOTE — ED NOTES
Bed: 08  Expected date:   Expected time:   Means of arrival: [facility] 2  Comments: Pt is brought to this ER by LS 2 from home with c/o lower abdominal/suprapubic pain, vaginal bleeding, nausea, and chronic diarrhea. Pt states onset of abdominal pain was noon today. Pt states she is positive for HPV, and she has a LEEP scheduled at [facility] at the beginning of May. Pt also states she is under the care and ongoing evaluation by a GI specialist for her chronic diarrhea. Pt arrives A+O x 4, GCS = 15, PMS x 4 intact, eupneic, and PWD. Pulse is regular et strong. Abdomen is soft et nondistended.        Luda Ruiz RN  04/24/21 0028

## 2021-04-24 NOTE — ED PROVIDER NOTES
[address] [facility]  EMERGENCY DEPARTMENT ENCOUNTER    Pt Name: Jake Carl  MRN: [id number]  YOB: 1995  Date of evaluation:4/24/21  PCP: Deidra Araujo MD    [address] [place] [address]       Chief Complaint   Patient presents with    Abdominal Pain    Vaginal Bleeding    Diarrhea    Nausea       HISTORY OF PRESENT ILLNESS    Jake Carl is a 27 y.o. female who presents with a chief complaint of lower abdominal pain and vaginal bleeding. Patient states her symptoms started earlier today. Onset was acute. States most of her pain is in her left side of her lower abdomen. Sharp and nonradiating. Nothing makes her symptoms better or worse. Vaginal bleeding is bright red. No vaginal discharge. She reports nausea but no vomiting. She has been having diarrhea but this is chronic for her and not starting today. No recent fevers, chills other illnesses. States that she is positive for HPV and she is scheduled for a LEEP procedure at [facility] in the beginning of May. Symptoms are acute. Symptoms are moderate. Patient has no other complaints at this time. REVIEW OF SYSTEMS       Review of Systems   Constitutional: Negative for chills, fatigue and fever. HENT: Negative for congestion, ear pain, sore throat and trouble swallowing. Eyes: Negative for visual disturbance. Respiratory: Negative for cough and shortness of breath. Cardiovascular: Negative for chest pain, palpitations and leg swelling. Gastrointestinal: Positive for abdominal pain, diarrhea and nausea. Negative for blood in stool, constipation and vomiting. Genitourinary: Positive for vaginal bleeding. Negative for dysuria, flank pain and vaginal discharge. Musculoskeletal: Negative for arthralgias, back pain, myalgias and neck pain. Skin: Negative for color change, rash and wound. Neurological: Negative for dizziness, weakness, light-headedness, numbness and headaches.    Psychiatric/Behavioral: Negative for unknown. She indicated that the status of her maternal grandfather is unknown.     family history includes Colon Cancer (age of onset: 27) in her maternal grandfather; Coronary Art Dis in her maternal grandmother; Diabetes in her father; No Known Problems in her mother. SOCIAL HISTORY      reports that she has been smoking cigarettes. She started smoking about 15 years ago. She has a 12.00 pack-year smoking history. She has never used smokeless tobacco. She reports current drug use. Drug: Marijuana. She reports that she does not drink alcohol. PHYSICAL EXAM     INITIAL VITALS:  height is 5' 2\" (1.575 m) and weight is 208 lb (94.3 kg). Her oral temperature is 97.9 °F (36.6 °C). Her blood pressure is 113/68 and her pulse is 54. Her respiration is 16 and oxygen saturation is 97%. Physical Exam  Vitals signs and nursing note reviewed. Constitutional:       General: She is not in acute distress. HENT:      Head: Normocephalic and atraumatic. Eyes:      Conjunctiva/sclera: Conjunctivae normal.      Pupils: Pupils are equal, round, and reactive to light. Neck:      Musculoskeletal: Neck supple. Cardiovascular:      Rate and Rhythm: Normal rate and regular rhythm. Heart sounds: Normal heart sounds. No murmur. Pulmonary:      Effort: Pulmonary effort is normal. No respiratory distress. Breath sounds: Normal breath sounds. Abdominal:      General: Bowel sounds are normal. There is no distension. Palpations: Abdomen is soft. Tenderness: There is abdominal tenderness in the suprapubic area and left lower quadrant. Musculoskeletal:         General: No tenderness. Lymphadenopathy:      Cervical: No cervical adenopathy. Skin:     General: Skin is warm and dry. Findings: No rash. Neurological:      Mental Status: She is alert and oriented to person, place, and time. Psychiatric:         Mood and Affect: Affect is tearful.          Judgment: Judgment normal.

## 2022-08-28 ENCOUNTER — HOSPITAL ENCOUNTER (EMERGENCY)
Age: 27
Discharge: HOME | End: 2022-08-29
Attending: EMERGENCY MEDICINE
Payer: COMMERCIAL

## 2022-08-28 ENCOUNTER — APPOINTMENT (OUTPATIENT)
Dept: GENERAL RADIOLOGY | Age: 27
End: 2022-08-28
Payer: COMMERCIAL

## 2022-08-28 DIAGNOSIS — R55 SYNCOPE AND COLLAPSE: Primary | ICD-10-CM

## 2022-08-28 LAB
ABSOLUTE EOS #: 0.3 K/UL (ref 0–0.4)
ABSOLUTE LYMPH #: 4.1 K/UL (ref 1–4.8)
ABSOLUTE MONO #: 0.8 K/UL (ref 0.1–1.3)
BASOPHILS # BLD: 1 % (ref 0–2)
BASOPHILS ABSOLUTE: 0.1 K/UL (ref 0–0.2)
EOSINOPHILS RELATIVE PERCENT: 2 % (ref 0–4)
HCT VFR BLD CALC: 37.2 % (ref 36–46)
HEMOGLOBIN: 12.1 G/DL (ref 12–16)
LYMPHOCYTES # BLD: 32 % (ref 24–44)
MCH RBC QN AUTO: 29.1 PG (ref 26–34)
MCHC RBC AUTO-ENTMCNC: 32.6 G/DL (ref 31–37)
MCV RBC AUTO: 89.4 FL (ref 80–100)
MONOCYTES # BLD: 6 % (ref 1–7)
PDW BLD-RTO: 14.3 % (ref 11.5–14.9)
PLATELET # BLD: 290 K/UL (ref 150–450)
PMV BLD AUTO: 6.9 FL (ref 6–12)
RBC # BLD: 4.16 M/UL (ref 4–5.2)
SEG NEUTROPHILS: 59 % (ref 36–66)
SEGMENTED NEUTROPHILS ABSOLUTE COUNT: 7.7 K/UL (ref 1.3–9.1)
WBC # BLD: 12.9 K/UL (ref 3.5–11)

## 2022-08-28 PROCEDURE — 96360 HYDRATION IV INFUSION INIT: CPT

## 2022-08-28 PROCEDURE — 99285 EMERGENCY DEPT VISIT HI MDM: CPT

## 2022-08-28 PROCEDURE — 84484 ASSAY OF TROPONIN QUANT: CPT

## 2022-08-28 PROCEDURE — 80048 BASIC METABOLIC PNL TOTAL CA: CPT

## 2022-08-28 PROCEDURE — 71045 X-RAY EXAM CHEST 1 VIEW: CPT

## 2022-08-28 PROCEDURE — 36415 COLL VENOUS BLD VENIPUNCTURE: CPT

## 2022-08-28 PROCEDURE — 93005 ELECTROCARDIOGRAM TRACING: CPT | Performed by: STUDENT IN AN ORGANIZED HEALTH CARE EDUCATION/TRAINING PROGRAM

## 2022-08-28 PROCEDURE — 84703 CHORIONIC GONADOTROPIN ASSAY: CPT

## 2022-08-28 PROCEDURE — 85025 COMPLETE CBC W/AUTO DIFF WBC: CPT

## 2022-08-28 ASSESSMENT — PAIN - FUNCTIONAL ASSESSMENT: PAIN_FUNCTIONAL_ASSESSMENT: 0-10

## 2022-08-28 ASSESSMENT — PAIN SCALES - GENERAL: PAINLEVEL_OUTOF10: 8

## 2022-08-28 ASSESSMENT — PAIN DESCRIPTION - LOCATION: LOCATION: HEAD

## 2022-08-29 ENCOUNTER — APPOINTMENT (OUTPATIENT)
Dept: CT IMAGING | Age: 27
End: 2022-08-29
Payer: COMMERCIAL

## 2022-08-29 VITALS
RESPIRATION RATE: 16 BRPM | TEMPERATURE: 97.8 F | SYSTOLIC BLOOD PRESSURE: 94 MMHG | HEIGHT: 62 IN | OXYGEN SATURATION: 96 % | DIASTOLIC BLOOD PRESSURE: 65 MMHG | BODY MASS INDEX: 29.08 KG/M2 | WEIGHT: 158 LBS | HEART RATE: 70 BPM

## 2022-08-29 LAB
ANION GAP SERPL CALCULATED.3IONS-SCNC: 11 MMOL/L (ref 9–17)
BUN BLDV-MCNC: 18 MG/DL (ref 6–20)
CALCIUM SERPL-MCNC: 8.9 MG/DL (ref 8.6–10.4)
CHLORIDE BLD-SCNC: 105 MMOL/L (ref 98–107)
CO2: 21 MMOL/L (ref 20–31)
CREAT SERPL-MCNC: 0.99 MG/DL (ref 0.5–0.9)
EKG ATRIAL RATE: 72 BPM
EKG P AXIS: 48 DEGREES
EKG P-R INTERVAL: 160 MS
EKG Q-T INTERVAL: 398 MS
EKG QRS DURATION: 84 MS
EKG QTC CALCULATION (BAZETT): 435 MS
EKG R AXIS: 37 DEGREES
EKG T AXIS: 44 DEGREES
EKG VENTRICULAR RATE: 72 BPM
GFR AFRICAN AMERICAN: >60 ML/MIN
GFR NON-AFRICAN AMERICAN: >60 ML/MIN
GFR SERPL CREATININE-BSD FRML MDRD: ABNORMAL ML/MIN/{1.73_M2}
GLUCOSE BLD-MCNC: 93 MG/DL (ref 70–99)
HCG QUALITATIVE: NEGATIVE
POTASSIUM SERPL-SCNC: 4 MMOL/L (ref 3.7–5.3)
SODIUM BLD-SCNC: 137 MMOL/L (ref 135–144)
TROPONIN, HIGH SENSITIVITY: <6 NG/L (ref 0–14)

## 2022-08-29 PROCEDURE — 93010 ELECTROCARDIOGRAM REPORT: CPT | Performed by: INTERNAL MEDICINE

## 2022-08-29 PROCEDURE — 70450 CT HEAD/BRAIN W/O DYE: CPT

## 2022-08-29 PROCEDURE — 96360 HYDRATION IV INFUSION INIT: CPT

## 2022-08-29 PROCEDURE — 2580000003 HC RX 258: Performed by: STUDENT IN AN ORGANIZED HEALTH CARE EDUCATION/TRAINING PROGRAM

## 2022-08-29 RX ORDER — 0.9 % SODIUM CHLORIDE 0.9 %
1000 INTRAVENOUS SOLUTION INTRAVENOUS ONCE
Status: COMPLETED | OUTPATIENT
Start: 2022-08-29 | End: 2022-08-29

## 2022-08-29 RX ADMIN — SODIUM CHLORIDE 1000 ML: 9 INJECTION, SOLUTION INTRAVENOUS at 00:38

## 2022-08-29 ASSESSMENT — PAIN DESCRIPTION - LOCATION
LOCATION: HEAD
LOCATION: HEAD

## 2022-08-29 ASSESSMENT — PAIN SCALES - GENERAL
PAINLEVEL_OUTOF10: 9
PAINLEVEL_OUTOF10: 9

## 2022-08-29 ASSESSMENT — PAIN DESCRIPTION - DESCRIPTORS
DESCRIPTORS: ACHING
DESCRIPTORS: ACHING

## 2022-08-29 ASSESSMENT — PAIN DESCRIPTION - ONSET
ONSET: SUDDEN
ONSET: SUDDEN

## 2022-08-29 ASSESSMENT — PAIN DESCRIPTION - FREQUENCY
FREQUENCY: INTERMITTENT
FREQUENCY: INTERMITTENT

## 2022-08-29 ASSESSMENT — PAIN DESCRIPTION - PAIN TYPE
TYPE: ACUTE PAIN
TYPE: ACUTE PAIN

## 2022-08-29 ASSESSMENT — PAIN - FUNCTIONAL ASSESSMENT
PAIN_FUNCTIONAL_ASSESSMENT: 0-10
PAIN_FUNCTIONAL_ASSESSMENT: 0-10

## 2022-08-29 NOTE — ED NOTES
Mode of arrival (squad #, walk in, police, etc) : Walk in        Chief complaint(s): Fall, head injury        Arrival Note (brief scenario, treatment PTA, etc). : At approximately 10 am today pt reports \"passing out\" at work. Does not remember if she was dizzy prior to passing out. Hit head on  rocks. Denies neck pain or confusion. C= \"Have you ever felt that you should Cut down on your drinking? \"  no  A= \"Have people Annoyed you by criticizing your drinking? \"  no  G= \"Have you ever felt bad or Guilty about your drinking? \"  no  E= \"Have you ever had a drink as an Eye-opener first thing in the morning to steady your nerves or to help a hangover? \"  no          Keith Parker RN  08/28/22 2053

## 2022-08-29 NOTE — DISCHARGE INSTRUCTIONS
Call today or tomorrow to follow up with Salemramin Velasco MD  in the next few days. Get up slowly; dangle your feet over the bed before standing up, do not stand up quickly. Make sure you stay hydrated and are eating and drinking. Return to the Emergency Department for blacking out, any headache, slurring of speech, loss of strength, excessive nausea or vomiting, any other care or concern.

## 2022-08-29 NOTE — ED PROVIDER NOTES
[address] ED  eMERGENCY dEPARTMENT eNCOUnter   Attending Attestation     Pt Name: Amanda Pike  MRN: [id number]  [place] 1995  Date of evaluation: 8/29/22       Amanda Pike is a 32 y.o. female who presents with Loss of Consciousness (Passed out at work, at 10 am,  fell and hit head on landscaping rocks, denies neck pain.)      History:   Patient had a syncopal event at work today. Patient states it was unprovoked she was a standing there talking and she feels like she has been eating and drinking okay and has not been sick recently but has had syncope in the past.  No chest pain or shortness of breath no palpitations. Exam: Vitals:   Vitals:    08/28/22 2042 08/28/22 2048   BP: (!) 93/39 (!) 111/55   Pulse: 86    Resp: 16    Temp: (!) 96.6 °F (35.9 °C)    TempSrc: Temporal    SpO2: 96%    Weight: 158 lb (71.7 kg)    Height: 5' 2\" (1.575 m)      Heart regular rate rhythm no murmurs. Lungs clear to auscultation bilaterally. I performed a history and physical examination of the patient and discussed management with the resident. I reviewed the residents note and agree with the documented findings and plan of care. Any areas of disagreement are noted on the chart. I was personally present for the key portions of any procedures. I have documented in the chart those procedures where I was not present during the key portions. I have personally reviewed all images and agree with the resident's interpretation. I have reviewed the emergency nurses triage note. I agree with the chief complaint, past medical history, past surgical history, allergies, medications, social and family history as documented unless otherwise noted below. Documentation of the HPI, Physical Exam and Medical Decision Making performed by medical students or scribes is based on my personal performance of the HPI, PE and MDM.  I personally evaluated and examined the patient in conjunction with the APC and agree with the assessment, treatment plan, and disposition of the patient as recorded by the APC. Additional findings are as noted.     Dilcia Blackman MD  Attending Emergency  Physician             Yuli Harris MD  08/29/22 2068

## 2022-08-29 NOTE — ED PROVIDER NOTES
[address]  Emergency Department Encounter  EmergencyMedicine Resident     Pt Asael Marie  MRN: [id number]  [place] 1995  Date of evaluation: 8/28/22  PCP:  MD Radha Cherry       Chief Complaint   Patient presents with    Loss of Consciousness     Passed out at work, at 10 am,  fell and hit head on landscaping rocks, denies neck pain. HISTORY OF PRESENT ILLNESS  (Location/Symptom, Timing/Onset, Context/Setting, Quality, Duration, Modifying Factors, Severity.)      Mathew Suh is a 32 y.o. female who presents with loss of consciousness. Patient states she did not experience any prodromal symptoms, states she was standing talking to a coworker when she lost consciousness. She did strike her head on landscaping rocks. She awakened several seconds later and was fully alert and oriented. Patient reports her coworkers did not notice any seizure-like activity. She denies any cardiac history. She does states she was recently diagnosed with prolactinoma as she was having nipple discharge and irregular periods. She is not on any anticoagulation. She has been eating and drinking regularly. She denies any recent illnesses, but states she has had similar episodes of syncope in the past.  She has not had full work-up for the syncopal events. She denies any headache, changes in vision, chest pain, shortness of breath, abdominal pain, nausea, vomiting, diarrhea, fevers, chills, numbness, tingling, weakness. PAST MEDICAL / SURGICAL / SOCIAL / FAMILY HISTORY      has a past medical history of Anxiety, Asthma, Chronic diarrhea, Depression, Headache, HPV (human papilloma virus) infection, IBS (irritable bowel syndrome), Nausea, and PTSD (post-traumatic stress disorder). has a past surgical history that includes [place] tooth extraction; Tongue surgery; Cholecystectomy, laparoscopic (N/A, 6/22/2020);  Cholecystectomy, laparoscopic (06/2020); and Colonoscopy (N/A, 4/7/2021). Social History     Socioeconomic History    Marital status: Single     Spouse name: Not on file    Number of children: 0    Years of education: Not on file    Highest education level: High school graduate   Occupational History    Occupation: [employer]   Tobacco Use    Smoking status: Every Day     Packs/day: 1.00     Years: 12.00     Pack years: 12.00     Types: Cigarettes     Start date: 2006    Smokeless tobacco: Never   Vaping Use    Vaping Use: Never used   Substance and Sexual Activity    Alcohol use: Never    Drug use: Yes     Types: Marijuana Juliette Postin)    Sexual activity: Yes     Partners: Female   Other Topics Concern    Not on file   Social History Narrative    Lives with [place]     Social Determinants of Health     Financial Resource Strain: Not on file   Food Insecurity: Not on file   Transportation Needs: Not on file   Physical Activity: Not on file   Stress: Not on file   Social Connections: Not on file   Intimate Partner Violence: Not on file   Housing Stability: Not on file       Family History   Problem Relation Age of Onset    Diabetes Father     Coronary Art Dis Maternal Grandmother     Colon Cancer Maternal Grandfather 30    No Known Problems Mother        Allergies:  Gentamicin and Hydrocodone    Home Medications:  Prior to Admission medications    Medication Sig Start Date End Date Taking?  Authorizing Provider   cholestyramine (QUESTRAN) 4 g packet Take 1 packet by mouth 3 times daily (with meals) 4/1/21   Hemanth Combs MD   famotidine (PEPCID) 20 MG tablet Take 20 mg by mouth 2 times daily    Historical Provider, MD   busPIRone (BUSPAR) 5 MG tablet Take 7.5 mg by mouth 3 times daily    Historical Provider, MD   FLUoxetine (PROZAC) 10 MG tablet Take 10 mg by mouth daily    Historical Provider, MD   albuterol sulfate  (90 Base) MCG/ACT inhaler Inhale 2 puffs into the lungs every 4 hours as needed for Wheezing 2/28/21 4/23/21  Hui Lee APRN - HYUN   dicyclomine (BENTYL) 10 MG capsule Take 1 capsule by mouth every 6 hours as needed (cramps)  Patient not taking: Reported on 4/23/2021 10/8/20   Kenny Reyes,    ondansetron (ZOFRAN) 4 MG tablet Take 1 tablet by mouth 3 times daily as needed for Nausea or Vomiting 6/23/20   Joselyn Reynoso MD       REVIEW OF SYSTEMS    (2-9 systems for level 4, 10 or more for level 5)      Review of Systems   Constitutional:  Negative for chills and fever. HENT:  Negative for facial swelling, hearing loss, nosebleeds, sore throat and trouble swallowing. Eyes:  Negative for visual disturbance. Respiratory:  Negative for shortness of breath. Cardiovascular:  Negative for chest pain. Gastrointestinal:  Negative for abdominal pain, nausea and vomiting. Genitourinary: Negative. Musculoskeletal:  Negative for arthralgias, myalgias, neck pain and neck stiffness. Skin:  Negative for wound. Neurological:  Positive for syncope. Negative for dizziness, tremors, seizures, facial asymmetry, speech difficulty, weakness, light-headedness, numbness and headaches. All other systems reviewed and are negative. PHYSICAL EXAM   (up to 7 for level 4, 8 or more for level 5)      INITIAL VITALS:   BP 94/65   Pulse 70   Temp 97.8 °F (36.6 °C)   Resp 16   Ht 5' 2\" (1.575 m)   Wt 158 lb (71.7 kg)   LMP 08/14/2022 (Approximate)   SpO2 96%   BMI 28.90 kg/m²     Physical Exam  Vitals reviewed. Constitutional:       General: She is not in acute distress. HENT:      Head: Normocephalic and atraumatic. Comments: No hemotympanum, no raccoon eyes, no dai sign, no CSF otorrhea or rhinorrhea     Mouth/Throat:      Mouth: Mucous membranes are moist.      Pharynx: Oropharynx is clear. Eyes:      Extraocular Movements: Extraocular movements intact. Pupils: Pupils are equal, round, and reactive to light. Neck:      Comments: No midline cervical thoracic or lumbar tenderness. No step-offs or deformities.   No ecchymosis or decreased range of motion. Cardiovascular:      Rate and Rhythm: Normal rate and regular rhythm. Pulses: Normal pulses. Heart sounds: Normal heart sounds. Pulmonary:      Effort: Pulmonary effort is normal.      Breath sounds: Normal breath sounds. No decreased breath sounds, wheezing, rhonchi or rales. Abdominal:      Palpations: Abdomen is soft. Tenderness: There is no abdominal tenderness. There is no guarding or rebound. Musculoskeletal:         General: No deformity or signs of injury. Normal range of motion. Cervical back: Normal range of motion and neck supple. Skin:     Capillary Refill: Capillary refill takes less than 2 seconds. Findings: No rash. Neurological:      General: No focal deficit present. Mental Status: She is alert and oriented to person, place, and time. Sensory: No sensory deficit. Motor: No weakness.       Comments: NIH 0 on arrival with no focal neuro deficits       DIFFERENTIAL  DIAGNOSIS     PLAN (LABS / IMAGING / EKG):  Orders Placed This Encounter   Procedures    XR CHEST PORTABLE    CT HEAD WO CONTRAST    CBC with Auto Differential    Basic Metabolic Panel    HCG Qualitative, Serum    Troponin    Vital signs    Orthostatic blood pressure and pulse    EKG 12 Lead       MEDICATIONS ORDERED:  Orders Placed This Encounter   Medications    0.9 % sodium chloride bolus       DDX: Cardiac arrhythmia, low glucose, anemia, ACS/MI, seizure, vasovagal, orthostatic, intracranial abnormality, SAH    DIAGNOSTIC RESULTS / EMERGENCY DEPARTMENT COURSE / MDM   LAB RESULTS:  Results for orders placed or performed during the hospital encounter of 08/28/22   CBC with Auto Differential   Result Value Ref Range    WBC 12.9 (H) 3.5 - 11.0 k/uL    RBC 4.16 4.0 - 5.2 m/uL    Hemoglobin 12.1 12.0 - 16.0 g/dL    Hematocrit 37.2 36 - 46 %    MCV 89.4 80 - 100 fL    MCH 29.1 26 - 34 pg    MCHC 32.6 31 - 37 g/dL    RDW 14.3 11.5 - 14.9 %    Platelets 048 206 - 576 k/uL    MPV 6.9 6.0 - 12.0 fL    Seg Neutrophils 59 36 - 66 %    Lymphocytes 32 24 - 44 %    Monocytes 6 1 - 7 %    Eosinophils % 2 0 - 4 %    Basophils 1 0 - 2 %    Segs Absolute 7.70 1.3 - 9.1 k/uL    Absolute Lymph # 4.10 1.0 - 4.8 k/uL    Absolute Mono # 0.80 0.1 - 1.3 k/uL    Absolute Eos # 0.30 0.0 - 0.4 k/uL    Basophils Absolute 0.10 0.0 - 0.2 k/uL   Basic Metabolic Panel   Result Value Ref Range    Glucose 93 70 - 99 mg/dL    BUN 18 6 - 20 mg/dL    Creatinine 0.99 (H) 0.50 - 0.90 mg/dL    Calcium 8.9 8.6 - 10.4 mg/dL    Sodium 137 135 - 144 mmol/L    Potassium 4.0 3.7 - 5.3 mmol/L    Chloride 105 98 - 107 mmol/L    CO2 21 20 - 31 mmol/L    Anion Gap 11 9 - 17 mmol/L    GFR Non-African American >60 >60 mL/min    GFR African American >60 >60 mL/min    GFR Comment         HCG Qualitative, Serum   Result Value Ref Range    hCG Qual NEGATIVE NEGATIVE   Troponin   Result Value Ref Range    Troponin, High Sensitivity <6 0 - 14 ng/L   EKG 12 Lead   Result Value Ref Range    Ventricular Rate 72 BPM    Atrial Rate 72 BPM    P-R Interval 160 ms    QRS Duration 84 ms    Q-T Interval 398 ms    QTc Calculation (Bazett) 435 ms    P Axis 48 degrees    R Axis 37 degrees    T Axis 44 degrees       IMPRESSION: 32 female presents with loss of consciousness without prodromal symptoms earlier today. She did strike her head on landscaping rocks as she fell. She did not endorse a short LOC without any seizure-like activity. Patient does have recent diagnosis of prolactinoma. She is not on any anticoagulation. On arrival, GCS 15.  AOx4 with airway intact. NIH 0. No focal neurodeficits. Denying any headache, changes in vision, chest pain, shortness of breath, numbness, tingling, weakness. Will obtain CT head, CBC, BMP, hCG, troponin, EKG and provide patient with NS bolus. Discussed negative findings with patient, discussed return precautions at length.   Recommend follow-up with primary care provider in the next few days for reevaluation. Patient voiced understanding and agreement with plan. RADIOLOGY:  CT HEAD WO CONTRAST    Result Date: 8/29/2022  EXAMINATION: CT OF THE HEAD WITHOUT CONTRAST  8/29/2022 12:42 am TECHNIQUE: CT of the head was performed without the administration of intravenous contrast. Automated exposure control, iterative reconstruction, and/or weight based adjustment of the mA/kV was utilized to reduce the radiation dose to as low as reasonably achievable. COMPARISON: None. HISTORY: ORDERING SYSTEM PROVIDED HISTORY: Fall, head injury TECHNOLOGIST PROVIDED HISTORY: Fall, head injury Decision Support Exception - unselect if not a suspected or confirmed emergency medical condition->Emergency Medical Condition (MA) Is the patient pregnant?->No Reason for Exam: pt. passed out at work about 930am 8-28-22. pt. c/o pain to right side of head Relevant Medical/Surgical History: pt. has facial piercings non removable FINDINGS: BRAIN/VENTRICLES: There is no acute intracranial hemorrhage, mass effect or midline shift. No abnormal extra-axial fluid collection. The gray-white differentiation is maintained without evidence of an acute infarct. There is no evidence of hydrocephalus. No wedge-shaped area of acute ischemia is identified. No basilar cistern or sulcal effacement. No cortical atrophy. ORBITS: The visualized portion of the orbits demonstrate no acute abnormality. SINUSES: Mastoid air cells and paranasal sinuses are well aerated. No air-fluid levels are identified. SOFT TISSUES/SKULL:  No acute abnormality of the visualized skull or soft tissues. No acute intracranial abnormality. No calvarial fracture or significant soft tissue hematoma.      XR CHEST PORTABLE    Result Date: 8/28/2022  EXAMINATION: ONE XRAY VIEW OF THE CHEST 8/28/2022 11:30 pm COMPARISON: 10/23/2020 HISTORY: ORDERING SYSTEM PROVIDED HISTORY: Hx SOB, syncope TECHNOLOGIST PROVIDED HISTORY: Hx SOB, syncope Reason for Exam: Hx SOB, syncope Additional signs and symptoms: Hx SOB, syncope Relevant Medical/Surgical History: Hx SOB, syncope FINDINGS: The lungs are clear. The cardiac and mediastinal contours are normal.  There is no pleural effusion or pneumothorax. No acute osseous abnormality is identified. No acute cardiopulmonary abnormality. EKG  EKG: Normal sinus rhythm. Normal axis. No ST elevation or depression. T wave inversion in V1. All EKG's are interpreted by the Emergency Department Physician who either signs or Co-signs this chart in the absence of a cardiologist.    EMERGENCY DEPARTMENT COURSE:  ED Course as of 08/31/22 1535   Sun Aug 28, 2022   2345 Hx prolactinoma. Episode of syncope today without prodromal symptoms, struck her head on a rock. No LOC, no anticoagulation. Asymptomatic now. [JG]   Mon Aug 29, 2022   0005 WBC(!): 12.9 [JG]   0121 CT head with no acute intracranial abnormality [JG]      ED Course User Index  [JG] Luis Carlos Camilo MD     CONSULTS:  None    FINAL IMPRESSION      1.  Syncope and collapse          DISPOSITION / PLAN     DISPOSITION Decision To Discharge 08/29/2022 01:39:30 AM      PATIENT REFERRED TO:  Som Khan MD  [place] [address] [address] [address] [facility]  [phone]    Schedule an appointment as soon as possible for a visit       [facility] ED  [facility] [address]  [phone]  Go to   If symptoms worsen    DISCHARGE MEDICATIONS:  Discharge Medication List as of 8/29/2022  1:40 AM          Luis Carlos Camilo MD  Emergency Medicine Resident    (Please note that portions of thisnote were completed with a voice recognition program.  Efforts were made to edit the dictations but occasionally words are mis-transcribed.)       Luis Carlos Camilo MD  Resident  08/31/22 6001

## 2022-08-31 ASSESSMENT — ENCOUNTER SYMPTOMS
TROUBLE SWALLOWING: 0
SHORTNESS OF BREATH: 0
SORE THROAT: 0
FACIAL SWELLING: 0
VOMITING: 0
ABDOMINAL PAIN: 0
NAUSEA: 0

## 2022-10-19 ENCOUNTER — OFFICE VISIT (OUTPATIENT)
Dept: FAMILY MEDICINE CLINIC | Age: 27
End: 2022-10-19
Payer: COMMERCIAL

## 2022-10-19 VITALS
OXYGEN SATURATION: 97 % | SYSTOLIC BLOOD PRESSURE: 123 MMHG | DIASTOLIC BLOOD PRESSURE: 77 MMHG | TEMPERATURE: 97.9 F | HEART RATE: 90 BPM

## 2022-10-19 DIAGNOSIS — K02.9 DENTAL CARIES: ICD-10-CM

## 2022-10-19 DIAGNOSIS — K08.89 PAIN, DENTAL: Primary | ICD-10-CM

## 2022-10-19 PROCEDURE — G8484 FLU IMMUNIZE NO ADMIN: HCPCS | Performed by: NURSE PRACTITIONER

## 2022-10-19 PROCEDURE — G8419 CALC BMI OUT NRM PARAM NOF/U: HCPCS | Performed by: NURSE PRACTITIONER

## 2022-10-19 PROCEDURE — G8427 DOCREV CUR MEDS BY ELIG CLIN: HCPCS | Performed by: NURSE PRACTITIONER

## 2022-10-19 PROCEDURE — 99213 OFFICE O/P EST LOW 20 MIN: CPT | Performed by: NURSE PRACTITIONER

## 2022-10-19 PROCEDURE — 4004F PT TOBACCO SCREEN RCVD TLK: CPT | Performed by: NURSE PRACTITIONER

## 2022-10-19 RX ORDER — PENICILLIN V POTASSIUM 500 MG/1
500 TABLET ORAL 4 TIMES DAILY
Qty: 40 TABLET | Refills: 0 | Status: SHIPPED | OUTPATIENT
Start: 2022-10-19 | End: 2022-10-29

## 2022-10-19 RX ORDER — CLONIDINE HYDROCHLORIDE 0.2 MG/1
TABLET ORAL
COMMUNITY
Start: 2022-09-27

## 2022-10-19 RX ORDER — IBUPROFEN 600 MG/1
600 TABLET ORAL EVERY 8 HOURS PRN
COMMUNITY
Start: 2022-05-22 | End: 2022-10-19

## 2022-10-19 RX ORDER — RISPERIDONE 1 MG/1
TABLET, FILM COATED ORAL
COMMUNITY
Start: 2022-10-03

## 2022-10-19 RX ORDER — DULOXETIN HYDROCHLORIDE 30 MG/1
30 CAPSULE, DELAYED RELEASE ORAL DAILY
COMMUNITY

## 2022-10-19 RX ORDER — DULOXETIN HYDROCHLORIDE 60 MG/1
60 CAPSULE, DELAYED RELEASE ORAL DAILY
COMMUNITY

## 2022-10-19 RX ORDER — IBUPROFEN 600 MG/1
600 TABLET ORAL 4 TIMES DAILY PRN
Qty: 360 TABLET | Refills: 1 | Status: SHIPPED | OUTPATIENT
Start: 2022-10-19

## 2022-10-19 RX ORDER — MIRTAZAPINE 30 MG/1
TABLET, FILM COATED ORAL
COMMUNITY
Start: 2022-10-03

## 2022-10-19 RX ORDER — NORGESTIMATE AND ETHINYL ESTRADIOL 7DAYSX3 LO
1 KIT ORAL DAILY
COMMUNITY

## 2022-10-19 RX ORDER — RISPERIDONE 0.5 MG/1
TABLET, FILM COATED ORAL
COMMUNITY
Start: 2022-09-26

## 2022-10-19 ASSESSMENT — ENCOUNTER SYMPTOMS: SINUS PRESSURE: 0

## 2022-10-19 NOTE — PROGRESS NOTES
[address] [place] [address] [place] [id number]  Dept: [phone]  Dept Fax: [phone]    Alber Pacheco is a 32 y.o. female who presents to the urgent care today for her medical conditions/complaints as notedbelow. Alber Pacheco is c/o of Dental Pain (Onset yesterday )      HPI:     32 yr old female presents for dental pain rt side lower molar  Was supposed to have the tooth pulled earlier this year but surgeon at [facility] was out on day of her appt. Sx started yesterday, face feels swollen. Tylenol and motrin not helping much, needs refill on motrin. Dental Pain   This is a new problem. The current episode started yesterday. The problem has been gradually worsening. The pain is moderate. Associated symptoms include facial pain. Pertinent negatives include no difficulty swallowing, fever, oral bleeding, sinus pressure or thermal sensitivity. She has tried acetaminophen and NSAIDs for the symptoms. The treatment provided mild relief.      Past Medical History:   Diagnosis Date    Anxiety     Asthma     Chronic diarrhea     Depression     Headache     HPV (human papilloma virus) infection     IBS (irritable bowel syndrome)     Nausea     PTSD (post-traumatic stress disorder)         Current Outpatient Medications   Medication Sig Dispense Refill    Cholecalciferol 50 MCG (2000 UT) TABS Take by mouth daily      cloNIDine (CATAPRES) 0.2 MG tablet take 1 tablet by mouth at bedtime      DULoxetine (CYMBALTA) 60 MG extended release capsule Take 60 mg by mouth daily      DULoxetine (CYMBALTA) 30 MG extended release capsule Take 30 mg by mouth daily      mirtazapine (REMERON) 30 MG tablet take 1 tablet by mouth at bedtime      Norgestim-Eth Estrad Triphasic 0.18/0.215/0.25 MG-25 MCG TABS Take 1 tablet by mouth daily      risperiDONE (RISPERDAL) 0.5 MG tablet take 1 tablet by mouth every morning      risperiDONE (RISPERDAL) 1 MG tablet take 1 tablet by mouth at bedtime      penicillin v potassium (VEETID) 500 MG tablet Take 1 tablet by mouth 4 times daily for 10 days 40 tablet 0    ibuprofen (ADVIL;MOTRIN) 600 MG tablet Take 1 tablet by mouth 4 times daily as needed for Pain 360 tablet 1    famotidine (PEPCID) 20 MG tablet Take 20 mg by mouth 2 times daily      busPIRone (BUSPAR) 5 MG tablet Take 7.5 mg by mouth 3 times daily      albuterol sulfate  (90 Base) MCG/ACT inhaler Inhale 2 puffs into the lungs every 4 hours as needed for Wheezing 1 Inhaler 0    dicyclomine (BENTYL) 10 MG capsule Take 1 capsule by mouth every 6 hours as needed (cramps) 20 capsule 0    cholestyramine (QUESTRAN) 4 g packet Take 1 packet by mouth 3 times daily (with meals) (Patient not taking: Reported on 10/19/2022) 90 packet 3    FLUoxetine (PROZAC) 10 MG tablet Take 10 mg by mouth daily (Patient not taking: Reported on 10/19/2022)      ondansetron (ZOFRAN) 4 MG tablet Take 1 tablet by mouth 3 times daily as needed for Nausea or Vomiting (Patient not taking: Reported on 10/19/2022) 15 tablet 0     No current facility-administered medications for this visit. Allergies   Allergen Reactions    Latex Rash    Gentamicin     Hydrocodone Nausea Only       Subjective:      Review of Systems   Constitutional:  Negative for fever. HENT:  Negative for sinus pressure. All other systems reviewed and are negative. 14 systems reviewed and negative except as listed in HPI. Objective:     Physical Exam  Vitals and nursing note reviewed. Constitutional:       General: She is not in acute distress. Appearance: Normal appearance. She is not ill-appearing, toxic-appearing or diaphoretic. HENT:      Head: Normocephalic and atraumatic. Right Ear: External ear normal.      Left Ear: External ear normal.      Mouth/Throat:      Lips: Pink. No lesions. Mouth: Mucous membranes are moist. No injury or oral lesions.       Dentition: Abnormal dentition. Does not have dentures. Dental tenderness and dental caries present. No gingival swelling, dental abscesses or gum lesions. Tongue: No lesions. Tongue does not deviate from midline. Palate: No mass and lesions. Pharynx: Oropharynx is clear. Uvula midline. No pharyngeal swelling, oropharyngeal exudate or posterior oropharyngeal erythema. Tonsils: No tonsillar exudate or tonsillar abscesses. Comments: Tooth #31, rt lower molar, broken and with cavity, discoloration. No gum swelling, drng or discoloration  No dental abscess noted  No facial swelling, + facial tenderness  Eyes:      General: No scleral icterus. Right eye: No discharge. Left eye: No discharge. Conjunctiva/sclera: Conjunctivae normal.   Cardiovascular:      Rate and Rhythm: Normal rate. Pulses: Normal pulses. Pulmonary:      Effort: Pulmonary effort is normal.   Musculoskeletal:      Cervical back: Neck supple. Comments: Gait steady   Skin:     General: Skin is warm and dry. Neurological:      General: No focal deficit present. Mental Status: She is alert. Psychiatric:         Mood and Affect: Mood normal.     /77   Pulse 90   Temp 97.9 °F (36.6 °C)   SpO2 97%     Assessment:       Diagnosis Orders   1. Pain, dental        2.  Dental caries            Plan:    Supposed to have painful tooth pulled earlier this year but surgeon out at [facility] that day and never went back  Tooth #31 is broken and decayed  Enc pt to schedule appt at [facility]  start pen vk rx  Refilled motrin    Return for follow up with your dentist.    Orders Placed This Encounter   Medications    penicillin v potassium (VEETID) 500 MG tablet     Sig: Take 1 tablet by mouth 4 times daily for 10 days     Dispense:  40 tablet     Refill:  0    ibuprofen (ADVIL;MOTRIN) 600 MG tablet     Sig: Take 1 tablet by mouth 4 times daily as needed for Pain     Dispense:  360 tablet     Refill:  1         Patient given educational materials - see patient instructions. Discussed use, benefit, and side effects of prescribed medications. All patient questions answered. Pt voicedunderstanding.     Electronically signed by TOYA Parsons CNP on 10/19/2022 at 9:22 AM

## 2022-11-02 ENCOUNTER — OFFICE VISIT (OUTPATIENT)
Dept: FAMILY MEDICINE CLINIC | Age: 27
End: 2022-11-02
Payer: COMMERCIAL

## 2022-11-02 VITALS
HEART RATE: 98 BPM | OXYGEN SATURATION: 97 % | WEIGHT: 248 LBS | DIASTOLIC BLOOD PRESSURE: 72 MMHG | TEMPERATURE: 97.4 F | BODY MASS INDEX: 45.64 KG/M2 | SYSTOLIC BLOOD PRESSURE: 110 MMHG | HEIGHT: 62 IN

## 2022-11-02 DIAGNOSIS — K02.9 PAIN DUE TO DENTAL CARIES: Primary | ICD-10-CM

## 2022-11-02 PROCEDURE — G8484 FLU IMMUNIZE NO ADMIN: HCPCS

## 2022-11-02 PROCEDURE — G8427 DOCREV CUR MEDS BY ELIG CLIN: HCPCS

## 2022-11-02 PROCEDURE — 99213 OFFICE O/P EST LOW 20 MIN: CPT

## 2022-11-02 PROCEDURE — 4004F PT TOBACCO SCREEN RCVD TLK: CPT

## 2022-11-02 PROCEDURE — G8417 CALC BMI ABV UP PARAM F/U: HCPCS

## 2022-11-02 RX ORDER — TOPIRAMATE 50 MG/1
TABLET, FILM COATED ORAL 2 TIMES DAILY
COMMUNITY
Start: 2019-08-06

## 2022-11-02 RX ORDER — CLINDAMYCIN HYDROCHLORIDE 300 MG/1
300 CAPSULE ORAL 4 TIMES DAILY
Qty: 28 CAPSULE | Refills: 0 | Status: SHIPPED | OUTPATIENT
Start: 2022-11-02 | End: 2022-11-09

## 2022-11-02 RX ORDER — SERTRALINE HYDROCHLORIDE 100 MG/1
TABLET, FILM COATED ORAL DAILY
COMMUNITY
Start: 2018-11-23 | End: 2022-11-02 | Stop reason: CLARIF

## 2022-11-02 RX ORDER — TIZANIDINE 4 MG/1
TABLET ORAL NIGHTLY
COMMUNITY
Start: 2019-07-30

## 2022-11-02 RX ORDER — OMEPRAZOLE 20 MG/1
CAPSULE, DELAYED RELEASE ORAL
COMMUNITY
Start: 2022-10-27

## 2022-11-02 RX ORDER — BUSPIRONE HYDROCHLORIDE 10 MG/1
TABLET ORAL
COMMUNITY
Start: 2022-10-21

## 2022-11-02 RX ORDER — CHLORHEXIDINE GLUCONATE 0.12 MG/ML
15 RINSE ORAL 2 TIMES DAILY
Qty: 420 ML | Refills: 0 | Status: SHIPPED | OUTPATIENT
Start: 2022-11-02 | End: 2022-11-16

## 2022-11-02 ASSESSMENT — ENCOUNTER SYMPTOMS
EYE DISCHARGE: 0
CHEST TIGHTNESS: 0
FACIAL SWELLING: 0
VOICE CHANGE: 0
EYE ITCHING: 0
SINUS PRESSURE: 0
TROUBLE SWALLOWING: 0
SHORTNESS OF BREATH: 0
COUGH: 0

## 2022-11-02 NOTE — PROGRESS NOTES
[address] [address]  Dept: [phone]  Dept Fax: [phone]    Josh Kruse is a 32 y.o. female who presents to the urgent care today for her medical conditions/complaints as notedbelow. Josh Kruse is c/o of Dental Pain (Patient has a tooth infection on the lower right side, pain radiates from the back of her right jaw. In may she was on abx for it and it went away and then it just came back about a week and a half ago )      HPI:     Patient presented to the walk in on 10/19/2022 for dental pain rt side lower molar  Was supposed to have the tooth pulled earlier this year but surgeon at [facility] was out on day of her appt. She returns to the walk in today for continued pain in right jaw  Patient endorses that she has a dentist appointment in 3 weeks    Dental Pain   This is a recurrent problem. The current episode started yesterday. The problem occurs constantly. The problem has been gradually worsening. Pertinent negatives include no difficulty swallowing, facial pain, fever, oral bleeding or sinus pressure. She has tried NSAIDs for the symptoms. The treatment provided moderate relief.      Past Medical History:   Diagnosis Date    Anxiety     Asthma     Chronic diarrhea     Depression     Headache     HPV (human papilloma virus) infection     IBS (irritable bowel syndrome)     Nausea     PTSD (post-traumatic stress disorder)         Current Outpatient Medications   Medication Sig Dispense Refill    busPIRone (BUSPAR) 10 MG tablet take 1 tablet by mouth once daily      clindamycin (CLEOCIN) 300 MG capsule Take 1 capsule by mouth 4 times daily for 7 days 28 capsule 0    chlorhexidine (PERIDEX) 0.12 % solution Take 15 mLs by mouth 2 times daily for 14 days Swish and spit 420 mL 0    Cholecalciferol 50 MCG (2000 UT) TABS Take by mouth daily      cloNIDine (CATAPRES) 0.2 MG tablet take 1 tablet by mouth at bedtime      DULoxetine (CYMBALTA) 60 MG extended release capsule Take 60 mg by mouth daily      DULoxetine (CYMBALTA) 30 MG extended release capsule Take 30 mg by mouth daily      mirtazapine (REMERON) 30 MG tablet take 1 tablet by mouth at bedtime      Norgestim-Eth Estrad Triphasic 0.18/0.215/0.25 MG-25 MCG TABS Take 1 tablet by mouth daily      risperiDONE (RISPERDAL) 0.5 MG tablet take 1 tablet by mouth every morning      risperiDONE (RISPERDAL) 1 MG tablet take 1 tablet by mouth at bedtime      ibuprofen (ADVIL;MOTRIN) 600 MG tablet Take 1 tablet by mouth 4 times daily as needed for Pain 360 tablet 1    famotidine (PEPCID) 20 MG tablet Take 20 mg by mouth 2 times daily      albuterol sulfate  (90 Base) MCG/ACT inhaler Inhale 2 puffs into the lungs every 4 hours as needed for Wheezing 1 Inhaler 0    omeprazole (PRILOSEC) 20 MG delayed release capsule take 1 capsule by mouth 30 MINUTES before MORNING MEAL (Patient not taking: Reported on 11/2/2022)      tiZANidine (ZANAFLEX) 4 MG tablet Take by mouth nightly (Patient not taking: Reported on 11/2/2022)      topiramate (TOPAMAX) 50 MG tablet Take by mouth 2 times daily (Patient not taking: Reported on 11/2/2022)      cholestyramine (QUESTRAN) 4 g packet Take 1 packet by mouth 3 times daily (with meals) (Patient not taking: No sig reported) 90 packet 3    busPIRone (BUSPAR) 5 MG tablet Take 7.5 mg by mouth 3 times daily (Patient not taking: Reported on 11/2/2022)      FLUoxetine (PROZAC) 10 MG tablet Take 10 mg by mouth daily (Patient not taking: No sig reported)      dicyclomine (BENTYL) 10 MG capsule Take 1 capsule by mouth every 6 hours as needed (cramps) (Patient not taking: Reported on 11/2/2022) 20 capsule 0    ondansetron (ZOFRAN) 4 MG tablet Take 1 tablet by mouth 3 times daily as needed for Nausea or Vomiting (Patient not taking: No sig reported) 15 tablet 0     No current facility-administered medications for this visit. Allergies   Allergen Reactions    Latex Rash    Gentamicin     Hydrocodone Nausea Only       Subjective:      Review of Systems   Constitutional:  Negative for chills and fever. HENT:  Negative for congestion, facial swelling, sinus pressure, trouble swallowing and voice change. Eyes:  Negative for discharge and itching. Respiratory:  Negative for cough, chest tightness and shortness of breath. Cardiovascular:  Negative for chest pain and leg swelling. Genitourinary:  Negative for difficulty urinating and dysuria. Neurological:  Negative for dizziness and headaches. All other systems reviewed and are negative. 14 systems reviewed and negative except as listed in HPI. Objective:     Physical Exam  Vitals reviewed. Constitutional:       General: She is not in acute distress. Appearance: Normal appearance. She is obese. She is not ill-appearing, toxic-appearing or diaphoretic. HENT:      Head: Normocephalic and atraumatic. Right Ear: Tympanic membrane and external ear normal. There is no impacted cerumen. Tympanic membrane is not erythematous. Left Ear: Tympanic membrane and external ear normal. There is no impacted cerumen. Tympanic membrane is not erythematous. Nose: Nose normal. No congestion or rhinorrhea. Right Sinus: No maxillary sinus tenderness or frontal sinus tenderness. Left Sinus: No maxillary sinus tenderness or frontal sinus tenderness. Mouth/Throat:      Lips: Pink. Mouth: Mucous membranes are moist.      Dentition: Abnormal dentition. Dental tenderness, gingival swelling and dental caries present. Pharynx: Oropharynx is clear. No pharyngeal swelling, oropharyngeal exudate or posterior oropharyngeal erythema. Eyes:      General:         Right eye: No discharge. Left eye: No discharge. Extraocular Movements: Extraocular movements intact.       Conjunctiva/sclera: Conjunctivae normal.      Pupils: Pupils are equal, round, and reactive to light. Cardiovascular:      Rate and Rhythm: Normal rate and regular rhythm. Heart sounds: Normal heart sounds. Pulmonary:      Effort: No respiratory distress. Breath sounds: Normal breath sounds. No stridor. No wheezing, rhonchi or rales. Chest:      Chest wall: No tenderness. Musculoskeletal:         General: No swelling, tenderness, deformity or signs of injury. Normal range of motion. Cervical back: Normal range of motion and neck supple. No rigidity or tenderness. Right lower leg: No edema. Left lower leg: No edema. Lymphadenopathy:      Cervical: No cervical adenopathy. Skin:     General: Skin is warm and dry. Capillary Refill: Capillary refill takes less than 2 seconds. Coloration: Skin is not pale. Findings: No erythema or rash. Neurological:      Mental Status: She is alert and oriented to person, place, and time. Motor: No weakness. Coordination: Coordination normal.      Gait: Gait normal.   Psychiatric:         Mood and Affect: Mood normal.         Behavior: Behavior normal.         Thought Content: Thought content normal.         Judgment: Judgment normal.     /72   Pulse 98   Temp 97.4 °F (36.3 °C) (Temporal)   Ht 5' 2\" (1.575 m)   Wt 248 lb (112.5 kg)   SpO2 97%   BMI 45.36 kg/m²     Assessment:       Diagnosis Orders   1. Pain due to dental caries  clindamycin (CLEOCIN) 300 MG capsule    chlorhexidine (PERIDEX) 0.12 % solution          Plan:   -Follow up with dentist  -Avoid smoking  -Finish all of antibiotic  -Chlorhexidine mouthwash  -Information provided on AVS  Return if symptoms worsen or fail to improve.     Orders Placed This Encounter   Medications    clindamycin (CLEOCIN) 300 MG capsule     Sig: Take 1 capsule by mouth 4 times daily for 7 days     Dispense:  28 capsule     Refill:  0    chlorhexidine (PERIDEX) 0.12 % solution     Sig: Take 15 mLs by mouth 2 times daily for 14 days Swish and spit     Dispense:  420 mL     Refill:  0         Patient given educational materials - see patient instructions. Discussed use, benefit, and side effects of prescribed medications. All patient questions answered. Pt voiced understanding.     Electronically signed by TOYA Valentine NP on 11/2/2022 at 3:33 PM

## 2022-11-14 ENCOUNTER — HOSPITAL ENCOUNTER (OUTPATIENT)
Age: 27
Discharge: HOME | End: 2022-11-16
Payer: COMMERCIAL

## 2022-11-14 ENCOUNTER — OFFICE VISIT (OUTPATIENT)
Dept: FAMILY MEDICINE CLINIC | Age: 27
End: 2022-11-14
Payer: COMMERCIAL

## 2022-11-14 ENCOUNTER — HOSPITAL ENCOUNTER (OUTPATIENT)
Dept: GENERAL RADIOLOGY | Age: 27
Discharge: HOME | End: 2022-11-16
Payer: COMMERCIAL

## 2022-11-14 VITALS
BODY MASS INDEX: 45.64 KG/M2 | TEMPERATURE: 97.6 F | SYSTOLIC BLOOD PRESSURE: 106 MMHG | WEIGHT: 248 LBS | HEART RATE: 83 BPM | DIASTOLIC BLOOD PRESSURE: 60 MMHG | HEIGHT: 62 IN | OXYGEN SATURATION: 95 %

## 2022-11-14 DIAGNOSIS — B96.89 ACUTE BACTERIAL SINUSITIS: ICD-10-CM

## 2022-11-14 DIAGNOSIS — F17.200 SMOKER: ICD-10-CM

## 2022-11-14 DIAGNOSIS — J45.41 MODERATE PERSISTENT ASTHMA WITH EXACERBATION: ICD-10-CM

## 2022-11-14 DIAGNOSIS — R05.1 ACUTE COUGH: ICD-10-CM

## 2022-11-14 DIAGNOSIS — R06.2 WHEEZING: ICD-10-CM

## 2022-11-14 DIAGNOSIS — J45.41 MODERATE PERSISTENT ASTHMA WITH EXACERBATION: Primary | ICD-10-CM

## 2022-11-14 DIAGNOSIS — J01.90 ACUTE BACTERIAL SINUSITIS: ICD-10-CM

## 2022-11-14 PROCEDURE — 4004F PT TOBACCO SCREEN RCVD TLK: CPT | Performed by: NURSE PRACTITIONER

## 2022-11-14 PROCEDURE — 99213 OFFICE O/P EST LOW 20 MIN: CPT | Performed by: NURSE PRACTITIONER

## 2022-11-14 PROCEDURE — 71046 X-RAY EXAM CHEST 2 VIEWS: CPT

## 2022-11-14 PROCEDURE — G8417 CALC BMI ABV UP PARAM F/U: HCPCS | Performed by: NURSE PRACTITIONER

## 2022-11-14 PROCEDURE — G8484 FLU IMMUNIZE NO ADMIN: HCPCS | Performed by: NURSE PRACTITIONER

## 2022-11-14 PROCEDURE — G8427 DOCREV CUR MEDS BY ELIG CLIN: HCPCS | Performed by: NURSE PRACTITIONER

## 2022-11-14 RX ORDER — CLONIDINE HYDROCHLORIDE 0.1 MG/1
TABLET ORAL
COMMUNITY
Start: 2022-11-10

## 2022-11-14 RX ORDER — FLUTICASONE PROPIONATE 50 MCG
2 SPRAY, SUSPENSION (ML) NASAL DAILY
Qty: 16 G | Refills: 0 | Status: SHIPPED | OUTPATIENT
Start: 2022-11-14

## 2022-11-14 RX ORDER — PREDNISONE 20 MG/1
20 TABLET ORAL 2 TIMES DAILY
Qty: 10 TABLET | Refills: 0 | Status: SHIPPED | OUTPATIENT
Start: 2022-11-14 | End: 2022-11-19

## 2022-11-14 RX ORDER — AZITHROMYCIN 250 MG/1
250 TABLET, FILM COATED ORAL SEE ADMIN INSTRUCTIONS
Qty: 6 TABLET | Refills: 0 | Status: SHIPPED | OUTPATIENT
Start: 2022-11-14 | End: 2022-11-19

## 2022-11-14 ASSESSMENT — ENCOUNTER SYMPTOMS
RHINORRHEA: 1
SORE THROAT: 0
HEARTBURN: 0
HEMOPTYSIS: 0
WHEEZING: 1
SHORTNESS OF BREATH: 1
COUGH: 1

## 2022-11-14 ASSESSMENT — PATIENT HEALTH QUESTIONNAIRE - PHQ9
SUM OF ALL RESPONSES TO PHQ QUESTIONS 1-9: 0
3. TROUBLE FALLING OR STAYING ASLEEP: 0
10. IF YOU CHECKED OFF ANY PROBLEMS, HOW DIFFICULT HAVE THESE PROBLEMS MADE IT FOR YOU TO DO YOUR WORK, TAKE CARE OF THINGS AT HOME, OR GET ALONG WITH OTHER PEOPLE: 0
6. FEELING BAD ABOUT YOURSELF - OR THAT YOU ARE A FAILURE OR HAVE LET YOURSELF OR YOUR FAMILY DOWN: 0
2. FEELING DOWN, DEPRESSED OR HOPELESS: 0
4. FEELING TIRED OR HAVING LITTLE ENERGY: 0
SUM OF ALL RESPONSES TO PHQ QUESTIONS 1-9: 0
7. TROUBLE CONCENTRATING ON THINGS, SUCH AS READING THE NEWSPAPER OR WATCHING TELEVISION: 0
SUM OF ALL RESPONSES TO PHQ QUESTIONS 1-9: 0
8. MOVING OR SPEAKING SO SLOWLY THAT OTHER PEOPLE COULD HAVE NOTICED. OR THE OPPOSITE, BEING SO FIGETY OR RESTLESS THAT YOU HAVE BEEN MOVING AROUND A LOT MORE THAN USUAL: 0
9. THOUGHTS THAT YOU WOULD BE BETTER OFF DEAD, OR OF HURTING YOURSELF: 0
SUM OF ALL RESPONSES TO PHQ QUESTIONS 1-9: 0
5. POOR APPETITE OR OVEREATING: 0

## 2023-01-03 ENCOUNTER — OFFICE VISIT (OUTPATIENT)
Dept: FAMILY MEDICINE CLINIC | Age: 28
End: 2023-01-03
Payer: COMMERCIAL

## 2023-01-03 VITALS
HEIGHT: 62 IN | WEIGHT: 250 LBS | OXYGEN SATURATION: 96 % | DIASTOLIC BLOOD PRESSURE: 69 MMHG | TEMPERATURE: 97.2 F | HEART RATE: 90 BPM | BODY MASS INDEX: 46.01 KG/M2 | SYSTOLIC BLOOD PRESSURE: 104 MMHG

## 2023-01-03 DIAGNOSIS — T21.22XA PARTIAL THICKNESS BURN OF ABDOMEN, INITIAL ENCOUNTER: Primary | ICD-10-CM

## 2023-01-03 DIAGNOSIS — T21.21XA SECOND DEGREE BURN OF CHEST WALL, INITIAL ENCOUNTER: ICD-10-CM

## 2023-01-03 PROCEDURE — 4004F PT TOBACCO SCREEN RCVD TLK: CPT | Performed by: NURSE PRACTITIONER

## 2023-01-03 PROCEDURE — G8484 FLU IMMUNIZE NO ADMIN: HCPCS | Performed by: NURSE PRACTITIONER

## 2023-01-03 PROCEDURE — G8417 CALC BMI ABV UP PARAM F/U: HCPCS | Performed by: NURSE PRACTITIONER

## 2023-01-03 PROCEDURE — G8427 DOCREV CUR MEDS BY ELIG CLIN: HCPCS | Performed by: NURSE PRACTITIONER

## 2023-01-03 PROCEDURE — 99213 OFFICE O/P EST LOW 20 MIN: CPT | Performed by: NURSE PRACTITIONER

## 2023-01-03 SDOH — ECONOMIC STABILITY: FOOD INSECURITY: WITHIN THE PAST 12 MONTHS, YOU WORRIED THAT YOUR FOOD WOULD RUN OUT BEFORE YOU GOT MONEY TO BUY MORE.: NEVER TRUE

## 2023-01-03 SDOH — ECONOMIC STABILITY: FOOD INSECURITY: WITHIN THE PAST 12 MONTHS, THE FOOD YOU BOUGHT JUST DIDN'T LAST AND YOU DIDN'T HAVE MONEY TO GET MORE.: NEVER TRUE

## 2023-01-03 ASSESSMENT — SOCIAL DETERMINANTS OF HEALTH (SDOH): HOW HARD IS IT FOR YOU TO PAY FOR THE VERY BASICS LIKE FOOD, HOUSING, MEDICAL CARE, AND HEATING?: VERY HARD

## 2023-01-03 ASSESSMENT — ENCOUNTER SYMPTOMS: BURN: 1
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
## 2023-01-03 NOTE — LETTER
[facility]  [address]  Phone: [phone]  Fax: [phone]    TOYA Colindres CNP        January 3, 2023     Patient: Abrahan Mae   YOB: 1995   Date of Visit: 1/3/2023       To Whom it May Concern:    Ruiz Jenkins was seen in my clinic on 1/3/2023. She may return to work on 1/4/2023. If you have any questions or concerns, please don't hesitate to call.     Sincerely,         TOYA Colindres CNP

## 2023-07-22 ENCOUNTER — OFFICE VISIT (OUTPATIENT)
Dept: FAMILY MEDICINE CLINIC | Age: 28
End: 2023-07-22
Payer: COMMERCIAL

## 2023-07-22 VITALS
HEIGHT: 62 IN | BODY MASS INDEX: 46.48 KG/M2 | TEMPERATURE: 97.6 F | OXYGEN SATURATION: 97 % | DIASTOLIC BLOOD PRESSURE: 80 MMHG | HEART RATE: 95 BPM | WEIGHT: 252.6 LBS | SYSTOLIC BLOOD PRESSURE: 118 MMHG

## 2023-07-22 DIAGNOSIS — K06.8 PAIN OF GINGIVA: Primary | ICD-10-CM

## 2023-07-22 PROBLEM — B97.7 HPV (HUMAN PAPILLOMA VIRUS) INFECTION: Status: ACTIVE | Noted: 2021-03-08

## 2023-07-22 PROBLEM — G47.9 SLEEP DISTURBANCE: Status: ACTIVE | Noted: 2017-10-04

## 2023-07-22 PROBLEM — M54.9 BACK PAIN: Status: ACTIVE | Noted: 2020-06-22

## 2023-07-22 PROCEDURE — G8417 CALC BMI ABV UP PARAM F/U: HCPCS | Performed by: FAMILY MEDICINE

## 2023-07-22 PROCEDURE — 4004F PT TOBACCO SCREEN RCVD TLK: CPT | Performed by: FAMILY MEDICINE

## 2023-07-22 PROCEDURE — 99213 OFFICE O/P EST LOW 20 MIN: CPT | Performed by: FAMILY MEDICINE

## 2023-07-22 PROCEDURE — G8427 DOCREV CUR MEDS BY ELIG CLIN: HCPCS | Performed by: FAMILY MEDICINE

## 2023-07-22 RX ORDER — GABAPENTIN 300 MG/1
CAPSULE ORAL
COMMUNITY
Start: 2023-07-06

## 2023-07-22 RX ORDER — CLINDAMYCIN HYDROCHLORIDE 150 MG/1
450 CAPSULE ORAL 3 TIMES DAILY
Qty: 63 CAPSULE | Refills: 0 | Status: SHIPPED | OUTPATIENT
Start: 2023-07-22 | End: 2023-07-29

## 2023-07-22 RX ORDER — CHLORHEXIDINE GLUCONATE 0.12 MG/ML
15 RINSE ORAL 2 TIMES DAILY
Qty: 420 ML | Refills: 0 | Status: SHIPPED | OUTPATIENT
Start: 2023-07-22 | End: 2023-08-05

## 2023-07-22 RX ORDER — PRAZOSIN HYDROCHLORIDE 2 MG/1
CAPSULE ORAL
COMMUNITY
Start: 2023-07-11

## 2023-07-22 RX ORDER — IBUPROFEN 800 MG/1
800 TABLET ORAL EVERY 8 HOURS PRN
Qty: 30 TABLET | Refills: 0 | Status: SHIPPED | OUTPATIENT
Start: 2023-07-22 | End: 2023-08-21

## 2023-07-22 RX ORDER — GABAPENTIN 100 MG/1
CAPSULE ORAL
COMMUNITY
Start: 2023-07-16

## 2023-07-22 ASSESSMENT — ENCOUNTER SYMPTOMS
SORE THROAT: 0
COUGH: 0

## 2023-07-22 NOTE — PROGRESS NOTES
Stephon Marin MD  [address] [facility]  [address] [address]  Dept: Nicolas Brown is a 25 y.o. female who presents today for hermedical conditions/complaints as noted below.   Obie Watts is here today c/o Oral Pain (RIGHT SIDE/STARTED LAST NIGHT )       HPI:     HPI    Patient presents to the walk-in clinic for evaluation of pain at the right lower side of her mouth that began yesterday  She noticed yesterday puslike taste in her mouth on the right side, pain started immediately after, currently taking ibuprofen, denies fever or URI symptoms  She has a history of multiple dental caries, was supposed to have 6 teeth extracted at [facility] but due to unavailability of the right specialist the day of her surgery she was only able to have 1 tooth extracted and will need to return for additional dental work  Was seen last year at the walk-in clinic for similar dental pain, was initially prescribed veetid but that did not work well for her so she had to then be prescribed clindamycin which she did well with    Patient Active Problem List   Diagnosis    IBS (irritable bowel syndrome)    Headache    Depression    Asthma    Anxiety    Class 2 obesity due to excess calories without serious comorbidity in adult    Tobacco abuse    Marijuana abuse    S/P laparoscopic cholecystectomy    HPV (human papilloma virus) infection    Chronic migraine    Back pain    Sleep disturbance       Past Medical History:   Diagnosis Date    Anxiety     Asthma     Chronic diarrhea     Depression     Headache     HPV (human papilloma virus) infection     IBS (irritable bowel syndrome)     Nausea     PTSD (post-traumatic stress disorder)      Past Surgical History:   Procedure Laterality Date    CHOLECYSTECTOMY, LAPAROSCOPIC N/A 6/22/2020    CHOLECYSTECTOMY LAPAROSCOPIC performed by Wiley Childers MD at [facility]

## 2023-07-28 ENCOUNTER — HOSPITAL ENCOUNTER (EMERGENCY)
Age: 28
Discharge: HOME | End: 2023-07-28
Attending: STUDENT IN AN ORGANIZED HEALTH CARE EDUCATION/TRAINING PROGRAM
Payer: COMMERCIAL

## 2023-07-28 VITALS
TEMPERATURE: 97.9 F | OXYGEN SATURATION: 95 % | WEIGHT: 252 LBS | BODY MASS INDEX: 46.38 KG/M2 | HEIGHT: 62 IN | DIASTOLIC BLOOD PRESSURE: 78 MMHG | RESPIRATION RATE: 15 BRPM | HEART RATE: 80 BPM | SYSTOLIC BLOOD PRESSURE: 134 MMHG

## 2023-07-28 DIAGNOSIS — B37.31 CANDIDAL VULVOVAGINITIS: Primary | ICD-10-CM

## 2023-07-28 DIAGNOSIS — R19.7 DIARRHEA OF PRESUMED INFECTIOUS ORIGIN: ICD-10-CM

## 2023-07-28 LAB
ALBUMIN SERPL-MCNC: 4.1 G/DL (ref 3.5–5.2)
ALP SERPL-CCNC: 97 U/L (ref 35–104)
ALT SERPL-CCNC: 21 U/L (ref 5–33)
ANION GAP SERPL CALCULATED.3IONS-SCNC: 14 MMOL/L (ref 9–17)
AST SERPL-CCNC: 24 U/L
BACTERIA URNS QL MICRO: ABNORMAL
BASOPHILS # BLD: 0 K/UL (ref 0–0.2)
BASOPHILS NFR BLD: 0 % (ref 0–2)
BILIRUB SERPL-MCNC: 0.3 MG/DL (ref 0.3–1.2)
BILIRUB UR QL STRIP: ABNORMAL
BUN SERPL-MCNC: 12 MG/DL (ref 6–20)
CALCIUM SERPL-MCNC: 9.2 MG/DL (ref 8.6–10.4)
CANDIDA SPECIES: POSITIVE
CASTS #/AREA URNS LPF: ABNORMAL /LPF
CHLORIDE SERPL-SCNC: 104 MMOL/L (ref 98–107)
CLARITY UR: ABNORMAL
CO2 SERPL-SCNC: 24 MMOL/L (ref 20–31)
COLOR UR: ABNORMAL
CREAT SERPL-MCNC: 1.1 MG/DL (ref 0.5–0.9)
CRYSTALS URNS MICRO: ABNORMAL /HPF
CRYSTALS URNS MICRO: ABNORMAL /HPF
EOSINOPHIL # BLD: 0.3 K/UL (ref 0–0.4)
EOSINOPHILS RELATIVE PERCENT: 2 % (ref 0–4)
EPI CELLS #/AREA URNS HPF: ABNORMAL /HPF
ERYTHROCYTE [DISTWIDTH] IN BLOOD BY AUTOMATED COUNT: 13.9 % (ref 11.5–14.9)
GARDNERELLA VAGINALIS: NEGATIVE
GFR SERPL CREATININE-BSD FRML MDRD: >60 ML/MIN/1.73M2
GLUCOSE SERPL-MCNC: 125 MG/DL (ref 70–99)
GLUCOSE UR STRIP-MCNC: NEGATIVE MG/DL
HCG UR QL: NEGATIVE
HCT VFR BLD AUTO: 37.2 % (ref 36–46)
HGB BLD-MCNC: 12.2 G/DL (ref 12–16)
HGB UR QL STRIP.AUTO: NEGATIVE
KETONES UR STRIP-MCNC: ABNORMAL MG/DL
LEUKOCYTE ESTERASE UR QL STRIP: ABNORMAL
LIPASE SERPL-CCNC: 57 U/L (ref 13–60)
LYMPHOCYTES NFR BLD: 4.1 K/UL (ref 1–4.8)
LYMPHOCYTES RELATIVE PERCENT: 31 % (ref 24–44)
MCH RBC QN AUTO: 29.2 PG (ref 26–34)
MCHC RBC AUTO-ENTMCNC: 32.8 G/DL (ref 31–37)
MCV RBC AUTO: 88.9 FL (ref 80–100)
MONOCYTES NFR BLD: 0.8 K/UL (ref 0.1–1.3)
MONOCYTES NFR BLD: 6 % (ref 1–7)
MUCOUS THREADS URNS QL MICRO: ABNORMAL
NEUTROPHILS NFR BLD: 61 % (ref 36–66)
NEUTS SEG NFR BLD: 8.1 K/UL (ref 1.3–9.1)
NITRITE UR QL STRIP: NEGATIVE
PH UR STRIP: 5.5 [PH] (ref 5–8)
PLATELET # BLD AUTO: 276 K/UL (ref 150–450)
PMV BLD AUTO: 7.5 FL (ref 6–12)
POTASSIUM SERPL-SCNC: 4.1 MMOL/L (ref 3.7–5.3)
PROT SERPL-MCNC: 7 G/DL (ref 6.4–8.3)
PROT UR STRIP-MCNC: ABNORMAL MG/DL
RBC # BLD AUTO: 4.18 M/UL (ref 4–5.2)
RBC #/AREA URNS HPF: ABNORMAL /HPF
SODIUM SERPL-SCNC: 142 MMOL/L (ref 135–144)
SOURCE: ABNORMAL
SP GR UR STRIP: 1.03 (ref 1–1.03)
TRICHOMONAS: NEGATIVE
UROBILINOGEN UR STRIP-ACNC: NORMAL EU/DL (ref 0–1)
WBC #/AREA URNS HPF: ABNORMAL /HPF
WBC OTHER # BLD: 13.3 K/UL (ref 3.5–11)

## 2023-07-28 PROCEDURE — 87480 CANDIDA DNA DIR PROBE: CPT

## 2023-07-28 PROCEDURE — 2580000003 HC RX 258: Performed by: PHYSICIAN ASSISTANT

## 2023-07-28 PROCEDURE — 87660 TRICHOMONAS VAGIN DIR PROBE: CPT

## 2023-07-28 PROCEDURE — 96361 HYDRATE IV INFUSION ADD-ON: CPT

## 2023-07-28 PROCEDURE — 87510 GARDNER VAG DNA DIR PROBE: CPT

## 2023-07-28 PROCEDURE — 36415 COLL VENOUS BLD VENIPUNCTURE: CPT

## 2023-07-28 PROCEDURE — 85027 COMPLETE CBC AUTOMATED: CPT

## 2023-07-28 PROCEDURE — 6360000002 HC RX W HCPCS: Performed by: PHYSICIAN ASSISTANT

## 2023-07-28 PROCEDURE — 6370000000 HC RX 637 (ALT 250 FOR IP): Performed by: STUDENT IN AN ORGANIZED HEALTH CARE EDUCATION/TRAINING PROGRAM

## 2023-07-28 PROCEDURE — 80053 COMPREHEN METABOLIC PANEL: CPT

## 2023-07-28 PROCEDURE — 81025 URINE PREGNANCY TEST: CPT

## 2023-07-28 PROCEDURE — 87491 CHLMYD TRACH DNA AMP PROBE: CPT

## 2023-07-28 PROCEDURE — 83690 ASSAY OF LIPASE: CPT

## 2023-07-28 PROCEDURE — 87591 N.GONORRHOEAE DNA AMP PROB: CPT

## 2023-07-28 PROCEDURE — 96374 THER/PROPH/DIAG INJ IV PUSH: CPT

## 2023-07-28 PROCEDURE — 81001 URINALYSIS AUTO W/SCOPE: CPT

## 2023-07-28 PROCEDURE — 99284 EMERGENCY DEPT VISIT MOD MDM: CPT

## 2023-07-28 RX ORDER — VANCOMYCIN HYDROCHLORIDE 125 MG/1
125 CAPSULE ORAL ONCE
Status: COMPLETED | OUTPATIENT
Start: 2023-07-28 | End: 2023-07-28

## 2023-07-28 RX ORDER — ONDANSETRON 2 MG/ML
4 INJECTION INTRAMUSCULAR; INTRAVENOUS ONCE
Status: COMPLETED | OUTPATIENT
Start: 2023-07-28 | End: 2023-07-28

## 2023-07-28 RX ORDER — 0.9 % SODIUM CHLORIDE 0.9 %
1000 INTRAVENOUS SOLUTION INTRAVENOUS ONCE
Status: COMPLETED | OUTPATIENT
Start: 2023-07-28 | End: 2023-07-28

## 2023-07-28 RX ORDER — VANCOMYCIN HYDROCHLORIDE 125 MG/1
125 CAPSULE ORAL 4 TIMES DAILY
Qty: 40 CAPSULE | Refills: 0 | Status: SHIPPED | OUTPATIENT
Start: 2023-07-28 | End: 2023-08-07

## 2023-07-28 RX ORDER — FLUCONAZOLE 150 MG/1
150 TABLET ORAL ONCE
Status: COMPLETED | OUTPATIENT
Start: 2023-07-28 | End: 2023-07-28

## 2023-07-28 RX ORDER — FLUCONAZOLE 150 MG/1
150 TABLET ORAL ONCE
Qty: 1 TABLET | Refills: 0 | Status: SHIPPED | OUTPATIENT
Start: 2023-08-04 | End: 2023-08-04

## 2023-07-28 RX ADMIN — VANCOMYCIN HYDROCHLORIDE 125 MG: 125 CAPSULE ORAL at 21:32

## 2023-07-28 RX ADMIN — SODIUM CHLORIDE 1000 ML: 9 INJECTION, SOLUTION INTRAVENOUS at 20:01

## 2023-07-28 RX ADMIN — FLUCONAZOLE 150 MG: 150 TABLET ORAL at 22:35

## 2023-07-28 RX ADMIN — ONDANSETRON 4 MG: 2 INJECTION INTRAMUSCULAR; INTRAVENOUS at 20:04

## 2023-07-28 ASSESSMENT — ENCOUNTER SYMPTOMS
COUGH: 0
ABDOMINAL PAIN: 0
VOMITING: 1
BLOOD IN STOOL: 0
SHORTNESS OF BREATH: 0
BACK PAIN: 0
NAUSEA: 1
DIARRHEA: 1
ANAL BLEEDING: 0
CONSTIPATION: 0

## 2023-07-28 ASSESSMENT — LIFESTYLE VARIABLES
HOW OFTEN DO YOU HAVE A DRINK CONTAINING ALCOHOL: NEVER
HOW MANY STANDARD DRINKS CONTAINING ALCOHOL DO YOU HAVE ON A TYPICAL DAY: PATIENT DOES NOT DRINK

## 2023-07-28 ASSESSMENT — PAIN - FUNCTIONAL ASSESSMENT: PAIN_FUNCTIONAL_ASSESSMENT: NONE - DENIES PAIN

## 2023-07-28 NOTE — ED PROVIDER NOTES
Pt Name: Candida Norris  MRN: [id number]  [address] 1995  Date of evaluation: 7/28/23   Candida Norris is a 25 y.o. female with CC: Diarrhea, Abdominal Pain, and Emesis    MDM:   This visit was performed by both a physician and an APC. I performed all aspects of the MDM as documented. Patient was having some ongoing diarrhea which she described as loose, foul-smelling, yellow. Has been on clindamycin recently. I do have a high concern for possible C. difficile however she was unable to provide a stool sample in the ER despite multiple attempts. Since I do have a high concern for we did start her on oral vancomycin. Did give her a dose of Diflucan for the Candida infection. Spoke to patient about this. I told her that as soon as she is able to provide us a stool sample that she has collected a stool sample container which we provided for her and then to present to either the emergency room or urgent care for C. difficile testing. 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 ED Course as of 07/29/23 0225 Fri Jul 28, 2023   2230 PARRIS SPECIES(!): POSITIVE [AP]      ED Course User Index  [AP] Glennie Favre,        CRITICAL CARE:   There was a high probability of clinically significant/life threatening deterioration in this patient's condition which required my urgent intervention. Total critical care time was 0 minutes. This excludes any time for separately reportable procedures. EKG: All EKG's are interpreted by the Emergency Department Physician who either signs or Co-signs this chart in the absence of a cardiologist.      RADIOLOGY:All plain film, CT, MRI, and formal ultrasound images (except ED bedside ultrasound) are read by the radiologist, see reports below, unless otherwise noted in MDM or here. No orders to display     LABS: All lab results were reviewed by myself, and all abnormals are listed below.   Labs Reviewed   VAGINITIS DNA PROBE - Abnormal; Notable 1509

## 2023-07-28 NOTE — ED TRIAGE NOTES
Mode of arrival (squad #, walk in, police, etc) : walk in        Chief complaint(s): NVD        Arrival Note (brief scenario, treatment PTA, etc). : Nausea, vomiting and diarrhea since last night. C= \"Have you ever felt that you should Cut down on your drinking? \"  no  A= \"Have people Annoyed you by criticizing your drinking? \"  no  G= \"Have you ever felt bad or Guilty about your drinking? \"  no  E= \"Have you ever had a drink as an Eye-opener first thing in the morning to steady your nerves or to help a hangover? \"  no      Deferred []      Reason for deferring:     *If yes to two or more: probable alcohol abuse. *

## 2023-07-28 NOTE — ED PROVIDER NOTES
EMERGENCY DEPARTMENT ENCOUNTER    Pt Name: Charlie Rodriguez  MRN: [id number]  [address] [facility] 1995  Date of evaluation: 7/28/23  CHIEF COMPLAINT       Chief Complaint   Patient presents with    Diarrhea    Abdominal Pain    Emesis     HISTORY OF PRESENT ILLNESS   To the ED for evaluation of nausea, vomiting, diarrhea. Symptoms began last night. Patient states her stools are soft, yellow, nonbloody. She denies any abdominal pain. Does report vomiting. Patient not keeping much down. Patient did start clindamycin about 5 days ago for a dental abscess. Patient is unsure if the clindamycin is causing her diarrhea. Additionally, patient believes she has a vaginal yeast infection. States she normally gets these when she is on antibiotics. States she has had some vaginal irritation, itching, vaginal discharge, area, urgency. .  She is not concerned for STDs. Denies fever, chills, chest pain, shortness of breath, cough, recent travel. No other complaints. The history is provided by the patient. REVIEW OF SYSTEMS     Review of Systems   Constitutional:  Negative for chills and fever. Respiratory:  Negative for cough and shortness of breath. Cardiovascular:  Negative for chest pain. Gastrointestinal:  Positive for diarrhea, nausea and vomiting. Negative for abdominal pain, anal bleeding, blood in stool and constipation. Genitourinary:  Positive for dysuria, urgency and vaginal discharge. Negative for flank pain, hematuria, pelvic pain, vaginal bleeding and vaginal pain. Musculoskeletal:  Negative for back pain and neck pain. Neurological:  Negative for headaches. All other systems reviewed and are negative.   PASTMEDICAL HISTORY     Past Medical History:   Diagnosis Date    Anxiety     Asthma     Chronic diarrhea     Depression     Headache     HPV (human papilloma virus) infection     IBS (irritable bowel syndrome)     Nausea     PTSD (post-traumatic stress disorder)      Past Problem

## 2023-07-29 NOTE — ED NOTES
Pt tried but is unable to provide stool sample.      MARIANO [place]  [birth year] 5974 no transient paralysis/no tremors/no vertigo/no loss of sensation/no headache/no paresthesias/no syncope/no difficulty walking/no generalized seizures/no focal seizures/no loss of consciousness/no weakness

## 2023-07-29 NOTE — ED NOTES
Pt still unable to provide stool sample. Pt said she would be willing to follow up with [facility] to provide a sample. Pt educated on the reason why the stool sample is important, pt verbalized understanding.      Nixon QUINTANA  35/[birth year]/31 7635

## 2023-07-29 NOTE — DISCHARGE INSTRUCTIONS
Once you have had a bowel movement, please collect a stool sample and present yourself to an urgent care or emergency room to have it tested for C. Difficile  If you are having any further symptoms related to your yeast infection in 1 week, please take the second dose of the Diflucan  Please take the vancomycin orally 4 times a day every day for the next 10 days until you get your C. difficile testing back.   If it is negative, you may stop taking the vancomycin  You need to be off work until you have been tested for C. difficile and it is negative  Please follow-up with a primary care provider  Return to the emergency room any severe worsening symptoms that you cannot control at home

## 2023-07-30 ENCOUNTER — HOSPITAL ENCOUNTER (OUTPATIENT)
Age: 28
Setting detail: SPECIMEN
Discharge: HOME | End: 2023-07-30
Payer: COMMERCIAL

## 2023-07-30 LAB
REASON FOR REJECTION: NORMAL
SPECIMEN SOURCE: NORMAL
ZZ NTE CLEAN UP: ORDERED TEST: NORMAL

## 2023-07-30 PROCEDURE — 87506 IADNA-DNA/RNA PROBE TQ 6-11: CPT

## 2023-07-31 LAB
C TRACH DNA SPEC QL PROBE+SIG AMP: NEGATIVE
CAMPYLOBACTER DNA SPEC NAA+PROBE: NORMAL
ETEC ELTA+ESTB GENES STL QL NAA+PROBE: NORMAL
N GONORRHOEA DNA SPEC QL PROBE+SIG AMP: NEGATIVE
P SHIGELLOIDES DNA STL QL NAA+PROBE: NORMAL
SALMONELLA DNA SPEC QL NAA+PROBE: NORMAL
SHIGA TOXIN STX GENE SPEC NAA+PROBE: NORMAL
SHIGELLA DNA SPEC QL NAA+PROBE: NORMAL
SPECIMEN DESCRIPTION: NORMAL
SPECIMEN DESCRIPTION: NORMAL
V CHOL+PARA RFBL+TRKH+TNAA STL QL NAA+PR: NORMAL
Y ENTERO RECN STL QL NAA+PROBE: NORMAL

## 2023-08-07 ENCOUNTER — HOSPITAL ENCOUNTER (OUTPATIENT)
Age: 28
Setting detail: SPECIMEN
Discharge: HOME | End: 2023-08-07
Payer: COMMERCIAL

## 2023-08-07 LAB
C DIFF GDH + TOXINS A+B STL QL IA.RAPID: NEGATIVE
SPECIMEN DESCRIPTION: NORMAL

## 2023-08-07 PROCEDURE — 87449 NOS EACH ORGANISM AG IA: CPT

## 2023-08-07 PROCEDURE — 87324 CLOSTRIDIUM AG IA: CPT

## 2023-11-07 DIAGNOSIS — K02.9 PAIN DUE TO DENTAL CARIES: ICD-10-CM

## 2023-11-07 RX ORDER — CLINDAMYCIN HYDROCHLORIDE 300 MG/1
CAPSULE ORAL
Qty: 28 CAPSULE | Refills: 0 | OUTPATIENT
Start: 2023-11-07

## 2023-11-15 ENCOUNTER — HOSPITAL ENCOUNTER (OUTPATIENT)
Age: 28
Discharge: HOME | End: 2023-11-15
Payer: COMMERCIAL

## 2023-11-15 ENCOUNTER — HOSPITAL ENCOUNTER (OUTPATIENT)
Age: 28
Discharge: HOME | End: 2023-11-17
Payer: COMMERCIAL

## 2023-11-15 ENCOUNTER — HOSPITAL ENCOUNTER (OUTPATIENT)
Dept: GENERAL RADIOLOGY | Age: 28
Discharge: HOME | End: 2023-11-17
Attending: FAMILY MEDICINE
Payer: COMMERCIAL

## 2023-11-15 DIAGNOSIS — M25.562 PAIN IN BOTH KNEES, UNSPECIFIED CHRONICITY: ICD-10-CM

## 2023-11-15 DIAGNOSIS — M25.572 LEFT ANKLE PAIN, UNSPECIFIED CHRONICITY: ICD-10-CM

## 2023-11-15 DIAGNOSIS — M25.571 RIGHT ANKLE PAIN, UNSPECIFIED CHRONICITY: ICD-10-CM

## 2023-11-15 DIAGNOSIS — M25.561 PAIN IN BOTH KNEES, UNSPECIFIED CHRONICITY: ICD-10-CM

## 2023-11-15 LAB
ALBUMIN SERPL-MCNC: 4 G/DL (ref 3.5–5.2)
ALP SERPL-CCNC: 107 U/L (ref 35–104)
ALT SERPL-CCNC: 37 U/L (ref 5–33)
ANION GAP SERPL CALCULATED.3IONS-SCNC: 11 MMOL/L (ref 9–17)
AST SERPL-CCNC: 33 U/L
ATYPICAL LYMPHOCYTE ABSOLUTE COUNT: 0.11 K/UL
ATYPICAL LYMPHOCYTES: 2 %
BASOPHILS # BLD: 0 K/UL (ref 0–0.2)
BASOPHILS NFR BLD: 0 % (ref 0–2)
BILIRUB SERPL-MCNC: 0.2 MG/DL (ref 0.3–1.2)
BUN SERPL-MCNC: 17 MG/DL (ref 6–20)
CALCIUM SERPL-MCNC: 8.9 MG/DL (ref 8.6–10.4)
CHLORIDE SERPL-SCNC: 102 MMOL/L (ref 98–107)
CO2 SERPL-SCNC: 25 MMOL/L (ref 20–31)
CREAT SERPL-MCNC: 0.9 MG/DL (ref 0.5–0.9)
EOSINOPHIL # BLD: 0 K/UL (ref 0–0.4)
EOSINOPHILS RELATIVE PERCENT: 0 % (ref 0–4)
ERYTHROCYTE [DISTWIDTH] IN BLOOD BY AUTOMATED COUNT: 15.1 % (ref 11.5–14.9)
GFR SERPL CREATININE-BSD FRML MDRD: >60 ML/MIN/1.73M2
GLUCOSE SERPL-MCNC: 87 MG/DL (ref 70–99)
HCT VFR BLD AUTO: 39 % (ref 36–46)
HGB BLD-MCNC: 12.9 G/DL (ref 12–16)
LYMPHOCYTES NFR BLD: 2.8 K/UL (ref 1–4.8)
LYMPHOCYTES RELATIVE PERCENT: 50 % (ref 24–44)
MCH RBC QN AUTO: 30.1 PG (ref 26–34)
MCHC RBC AUTO-ENTMCNC: 33.1 G/DL (ref 31–37)
MCV RBC AUTO: 91.1 FL (ref 80–100)
MONOCYTES NFR BLD: 0.28 K/UL (ref 0.1–1.3)
MONOCYTES NFR BLD: 5 % (ref 1–7)
MORPHOLOGY: ABNORMAL
MORPHOLOGY: ABNORMAL
NEUTROPHILS NFR BLD: 43 % (ref 36–66)
NEUTS SEG NFR BLD: 2.41 K/UL (ref 1.3–9.1)
PLATELET # BLD AUTO: 212 K/UL (ref 150–450)
PMV BLD AUTO: 7.9 FL (ref 6–12)
POTASSIUM SERPL-SCNC: 3.9 MMOL/L (ref 3.7–5.3)
PROT SERPL-MCNC: 7.1 G/DL (ref 6.4–8.3)
RBC # BLD AUTO: 4.27 M/UL (ref 4–5.2)
SODIUM SERPL-SCNC: 138 MMOL/L (ref 135–144)
TSH SERPL DL<=0.05 MIU/L-ACNC: 0.57 UIU/ML (ref 0.3–5)
WBC OTHER # BLD: 5.6 K/UL (ref 3.5–11)

## 2023-11-15 PROCEDURE — 73562 X-RAY EXAM OF KNEE 3: CPT

## 2023-11-15 PROCEDURE — 84443 ASSAY THYROID STIM HORMONE: CPT

## 2023-11-15 PROCEDURE — 80053 COMPREHEN METABOLIC PANEL: CPT

## 2023-11-15 PROCEDURE — 85025 COMPLETE CBC W/AUTO DIFF WBC: CPT

## 2023-11-15 PROCEDURE — 36415 COLL VENOUS BLD VENIPUNCTURE: CPT

## 2023-11-15 PROCEDURE — 73610 X-RAY EXAM OF ANKLE: CPT

## 2024-03-26 ENCOUNTER — HOSPITAL ENCOUNTER (EMERGENCY)
Age: 29
Discharge: HOME | End: 2024-03-26
Attending: EMERGENCY MEDICINE
Payer: COMMERCIAL

## 2024-03-26 ENCOUNTER — APPOINTMENT (OUTPATIENT)
Dept: GENERAL RADIOLOGY | Age: 29
End: 2024-03-26
Attending: EMERGENCY MEDICINE
Payer: COMMERCIAL

## 2024-03-26 VITALS
OXYGEN SATURATION: 97 % | SYSTOLIC BLOOD PRESSURE: 133 MMHG | WEIGHT: 264 LBS | HEART RATE: 85 BPM | RESPIRATION RATE: 16 BRPM | TEMPERATURE: 97.8 F | HEIGHT: 62 IN | BODY MASS INDEX: 48.58 KG/M2 | DIASTOLIC BLOOD PRESSURE: 66 MMHG

## 2024-03-26 DIAGNOSIS — R07.81 RIB PAIN: Primary | ICD-10-CM

## 2024-03-26 LAB
BACTERIA URNS QL MICRO: ABNORMAL
BILIRUB UR QL STRIP: NEGATIVE
CASTS #/AREA URNS LPF: ABNORMAL /LPF
CLARITY UR: ABNORMAL
COLOR UR: YELLOW
EPI CELLS #/AREA URNS HPF: ABNORMAL /HPF
GLUCOSE UR STRIP-MCNC: NEGATIVE MG/DL
HCG UR QL: NEGATIVE
HGB UR QL STRIP.AUTO: NEGATIVE
KETONES UR STRIP-MCNC: ABNORMAL MG/DL
LEUKOCYTE ESTERASE UR QL STRIP: ABNORMAL
NITRITE UR QL STRIP: NEGATIVE
PH UR STRIP: 5.5 [PH] (ref 5–8)
PROT UR STRIP-MCNC: NEGATIVE MG/DL
RBC #/AREA URNS HPF: ABNORMAL /HPF
SP GR UR STRIP: 1.03 (ref 1–1.03)
UROBILINOGEN UR STRIP-ACNC: NORMAL EU/DL (ref 0–1)
WBC #/AREA URNS HPF: ABNORMAL /HPF

## 2024-03-26 PROCEDURE — 81001 URINALYSIS AUTO W/SCOPE: CPT

## 2024-03-26 PROCEDURE — 99284 EMERGENCY DEPT VISIT MOD MDM: CPT

## 2024-03-26 PROCEDURE — 6370000000 HC RX 637 (ALT 250 FOR IP): Performed by: EMERGENCY MEDICINE

## 2024-03-26 PROCEDURE — 71101 X-RAY EXAM UNILAT RIBS/CHEST: CPT

## 2024-03-26 PROCEDURE — 81025 URINE PREGNANCY TEST: CPT

## 2024-03-26 RX ORDER — ACETAMINOPHEN 500 MG
1000 TABLET ORAL ONCE
Status: COMPLETED | OUTPATIENT
Start: 2024-03-26 | End: 2024-03-26

## 2024-03-26 RX ORDER — NAPROXEN 250 MG/1
500 TABLET ORAL ONCE
Status: COMPLETED | OUTPATIENT
Start: 2024-03-26 | End: 2024-03-26

## 2024-03-26 RX ORDER — NAPROXEN 500 MG/1
500 TABLET ORAL 2 TIMES DAILY
Qty: 20 TABLET | Refills: 0 | Status: SHIPPED | OUTPATIENT
Start: 2024-03-26

## 2024-03-26 RX ORDER — ACETAMINOPHEN 500 MG
1000 TABLET ORAL EVERY 6 HOURS PRN
Qty: 60 TABLET | Refills: 0 | Status: SHIPPED | OUTPATIENT
Start: 2024-03-26

## 2024-03-26 RX ADMIN — NAPROXEN 500 MG: 250 TABLET ORAL at 13:24

## 2024-03-26 RX ADMIN — ACETAMINOPHEN 1000 MG: 500 TABLET ORAL at 13:24

## 2024-03-26 ASSESSMENT — PAIN DESCRIPTION - ORIENTATION: ORIENTATION: LEFT

## 2024-03-26 ASSESSMENT — PAIN DESCRIPTION - LOCATION: LOCATION: BACK;RIB CAGE

## 2024-03-26 ASSESSMENT — PAIN - FUNCTIONAL ASSESSMENT: PAIN_FUNCTIONAL_ASSESSMENT: 0-10

## 2024-03-26 ASSESSMENT — LIFESTYLE VARIABLES
HOW MANY STANDARD DRINKS CONTAINING ALCOHOL DO YOU HAVE ON A TYPICAL DAY: PATIENT DOES NOT DRINK
HOW OFTEN DO YOU HAVE A DRINK CONTAINING ALCOHOL: NEVER

## 2024-03-26 ASSESSMENT — PAIN SCALES - GENERAL: PAINLEVEL_OUTOF10: 9

## 2024-03-26 NOTE — ED PROVIDER NOTES
Costochondritis, pleurisy, unlikely rib fracture or malignancy.  Do not suspect PE or acute MI or ACS or tension pneumothorax or aortic dissection.  Do not suspect UTI or sepsis or pyelonephritis.  Do not suspect kidney stone  3)  Treatment and Disposition  X-rays negative.  I do not see any evidence of UTI.  Urine was mildly contaminated.  Urine pregnancy negative.  Tylenol Naprosyn provided.  Given anticipatory guidance, discharge instructions, follow-up with her PCP tomorrow.       DATA FOR LAB AND RADIOLOGY TESTS ORDERED BELOW ARE REVIEWED BY THE ED CLINICIAN:    RADIOLOGY: All x-rays, CT, MRI, and formal ultrasound images (except ED bedside ultrasound) are read by the radiologist, see reports below, unless otherwise noted in MDM or here.  Reports below are reviewed by myself.  XR RIBS LEFT INCLUDE CHEST (MIN 3 VIEWS)   Final Result   Unremarkable appearing chest and left ribs             LABS: Lab orders shown below, the results are reviewed by myself, and all abnormals are listed below.  Labs Reviewed   URINALYSIS WITH REFLEX TO CULTURE - Abnormal; Notable for the following components:       Result Value    Turbidity UA Cloudy (*)     Ketones, Urine TRACE (*)     Specific Gravity, UA 1.031 (*)     Leukocyte Esterase, Urine TRACE (*)     All other components within normal limits   MICROSCOPIC URINALYSIS - Abnormal; Notable for the following components:    WBC, UA 3 to 5 (*)     RBC, UA 3 to 5 (*)     Casts UA 0 TO 2 (*)     Bacteria, UA FEW (*)     All other components within normal limits   PREGNANCY, URINE       Vitals Reviewed:    Vitals:    03/26/24 1154   BP: 133/66   Pulse: 85   Resp: 16   Temp: 97.8 °F (36.6 °C)   TempSrc: Oral   SpO2: 97%   Weight: 119.7 kg (264 lb)   Height: 1.575 m (5' 2\")     MEDICATIONS GIVEN TO PATIENT THIS ENCOUNTER:  Orders Placed This Encounter   Medications    acetaminophen (TYLENOL) 500 MG tablet     Sig: Take 2 tablets by mouth every 6 hours as needed for Pain     Dispense:

## 2024-03-26 NOTE — ED TRIAGE NOTES
Mode of arrival (squad #, walk in, police, etc) : walk-in        Chief complaint(s): rib pain and SOB        Arrival Note (brief scenario, treatment PTA, etc).: patient states this has been going on for about 10 days, worse over the past 5 days, hurts to take a deep breath. Goes from the back and wraps to the from the lower left rib/lung area         C= \"Have you ever felt that you should Cut down on your drinking?\"  No  A= \"Have people Annoyed you by criticizing your drinking?\"  No  G= \"Have you ever felt bad or Guilty about your drinking?\"  No  E= \"Have you ever had a drink as an Eye-opener first thing in the morning to steady your nerves or to help a hangover?\"  No      Deferred []      Reason for deferring: N/A    *If yes to two or more: probable alcohol abuse.*

## 2024-06-26 ENCOUNTER — HOSPITAL ENCOUNTER (EMERGENCY)
Age: 29
Discharge: HOME | End: 2024-06-26
Attending: EMERGENCY MEDICINE
Payer: COMMERCIAL

## 2024-06-26 VITALS
OXYGEN SATURATION: 95 % | WEIGHT: 265 LBS | HEART RATE: 99 BPM | SYSTOLIC BLOOD PRESSURE: 135 MMHG | RESPIRATION RATE: 18 BRPM | DIASTOLIC BLOOD PRESSURE: 89 MMHG | HEIGHT: 62 IN | BODY MASS INDEX: 48.76 KG/M2 | TEMPERATURE: 98.1 F

## 2024-06-26 DIAGNOSIS — K08.89 PAIN, DENTAL: Primary | ICD-10-CM

## 2024-06-26 DIAGNOSIS — K02.9 DENTAL CARIES: ICD-10-CM

## 2024-06-26 LAB — HCG UR QL: NEGATIVE

## 2024-06-26 PROCEDURE — 81025 URINE PREGNANCY TEST: CPT

## 2024-06-26 PROCEDURE — 99283 EMERGENCY DEPT VISIT LOW MDM: CPT

## 2024-06-26 RX ORDER — PENICILLIN V POTASSIUM 500 MG/1
500 TABLET ORAL 4 TIMES DAILY
Qty: 40 TABLET | Refills: 0 | Status: SHIPPED | OUTPATIENT
Start: 2024-06-26 | End: 2024-06-26

## 2024-06-26 RX ORDER — PENICILLIN V POTASSIUM 500 MG/1
500 TABLET ORAL 4 TIMES DAILY
Qty: 40 TABLET | Refills: 0 | Status: SHIPPED | OUTPATIENT
Start: 2024-06-26 | End: 2024-07-06

## 2024-06-26 RX ORDER — TRAMADOL HYDROCHLORIDE 50 MG/1
50 TABLET ORAL EVERY 8 HOURS PRN
Qty: 9 TABLET | Refills: 0 | Status: SHIPPED | OUTPATIENT
Start: 2024-06-26 | End: 2024-06-26

## 2024-06-26 RX ORDER — TRAMADOL HYDROCHLORIDE 50 MG/1
50 TABLET ORAL EVERY 8 HOURS PRN
Qty: 9 TABLET | Refills: 0 | Status: SHIPPED | OUTPATIENT
Start: 2024-06-26 | End: 2024-06-29

## 2024-06-26 ASSESSMENT — PAIN SCALES - GENERAL: PAINLEVEL_OUTOF10: 9

## 2024-06-26 ASSESSMENT — PAIN DESCRIPTION - LOCATION: LOCATION: TEETH

## 2024-06-26 ASSESSMENT — PAIN - FUNCTIONAL ASSESSMENT: PAIN_FUNCTIONAL_ASSESSMENT: 0-10

## 2024-06-26 ASSESSMENT — PAIN DESCRIPTION - FREQUENCY: FREQUENCY: CONTINUOUS

## 2024-06-26 NOTE — ED NOTES
Pt discharged in stable condition with Rx's and discharge instructions. Pt ambulates to door with steady gait and without assistance.

## 2024-06-26 NOTE — DISCHARGE INSTRUCTIONS
Please follow up with your dentist.  Return to the ED if you develop worsening pain, facial swelling, throat swelling, difficulty swallowing, oral swelling, chest pain, shortness of breath, fevers or any other concerning symptoms.  Do not drink alcohol or operate machinery while taking tramadol. Can cause drowsiness.

## 2024-06-26 NOTE — ED PROVIDER NOTES
[facility] ED  eMERGENCY dEPARTMENT eNCOUnter      Pt Name: Sulema Macdonald  MRN: [id number]  Birthdate 1995  Date of evaluation: 6/26/24      CHIEF COMPLAINT:   Chief Complaint   Patient presents with    Dental Pain     HISTORY OF PRESENT ILLNESS    Sulema Macdonald is a 29 y.o. female who presents with complaints of left upper dental pain x 4 days.   Pt states she broke a molar 4 days ago and has had severe pain, sensitivity to hot and cold. Pt also states she has a wisdom tooth that is bothering her.  She reports  mild left sided facial swelling.  Denies n/v/f/c/abd pain/CP/SOB, throat swelling.  She has tried motrin with no relief.  She does not have a dentist.  No other complaints.         REVIEW OF SYSTEMS     Constitutional: Denies recent fever, chills.  Eyes: No visual changes.    Neck: No neck pain.   Respiratory: Denies recent shortness of breath.    Cardiac:  Denies recent chest pain.    GI: denies any recent abdominal pain nausea or vomiting.  Denies Blood in the stool or black tarry stools.    : denies dysuria.    Musculoskeletal: Denies focal weakness.    Neurologic: denies headache or focal weakness.    Skin:  Denies any rash.      Negative in 10 essential Systems except as mentioned above and in the HPI.      PAST MEDICAL HISTORY   PMH:  has a past medical history of Anxiety, Asthma, Chronic diarrhea, Depression, Headache, HPV (human papilloma virus) infection, IBS (irritable bowel syndrome), Nausea, and PTSD (post-traumatic stress disorder). none otherwise stated in nurses notes  Surgical History:  has a past surgical history that includes [place] tooth extraction; Tongue surgery; Cholecystectomy, laparoscopic (N/A, 6/22/2020); Cholecystectomy, laparoscopic (06/2020); and Colonoscopy (N/A, 4/7/2021). none otherwise stated in nurses notes  Social History:  reports that she has been smoking cigarettes and e-cigarettes. She started smoking about 18 years ago. She has a 18.5 pack-year smoking

## 2024-06-26 NOTE — ED PROVIDER NOTES
[facility] ED  eMERGENCY dEPARTMENT eNCOUnter   Independent Attestation     Pt Name: Sulema Macdonald  MRN: [id number]  Birthdate 1995  Date of evaluation: 6/26/24   Sulema Macdonald is a 29 y.o. female who presents with Dental Pain    Vitals:   Vitals:    06/26/24 1402   BP: 135/89   Pulse: 99   Resp: 18   Temp: 98.1 °F (36.7 °C)   TempSrc: Oral   SpO2: 95%   Weight: 120.2 kg (265 lb)   Height: 1.575 m (5' 2\")     Impression:   1. Pain, dental    2. Dental caries      I was personally available for consultation in the Emergency Department. I have reviewed the chart and agree with the documentation as recorded by the MLP, including the assessment, treatment plan and disposition.  Geovanni Mckinney MD  Attending Emergency  Physician                  Geovanni Mckinney MD  06/26/24 6326

## 2024-09-26 ENCOUNTER — HOSPITAL ENCOUNTER (OUTPATIENT)
Age: 29
Discharge: HOME | End: 2024-09-26
Payer: COMMERCIAL

## 2024-09-26 LAB
ANION GAP SERPL CALCULATED.3IONS-SCNC: 11 MMOL/L (ref 9–17)
BASOPHILS # BLD: 0 K/UL (ref 0–0.2)
BASOPHILS NFR BLD: 0 % (ref 0–2)
BUN SERPL-MCNC: 13 MG/DL (ref 6–20)
CALCIUM SERPL-MCNC: 9.2 MG/DL (ref 8.6–10.4)
CHLORIDE SERPL-SCNC: 104 MMOL/L (ref 98–107)
CO2 SERPL-SCNC: 22 MMOL/L (ref 20–31)
CREAT SERPL-MCNC: 1.1 MG/DL (ref 0.5–0.9)
EOSINOPHIL # BLD: 0.1 K/UL (ref 0–0.4)
EOSINOPHILS RELATIVE PERCENT: 1 % (ref 0–4)
ERYTHROCYTE [DISTWIDTH] IN BLOOD BY AUTOMATED COUNT: 14.8 % (ref 11.5–14.9)
GFR, ESTIMATED: 69 ML/MIN/1.73M2
GLUCOSE SERPL-MCNC: 91 MG/DL (ref 70–99)
HCT VFR BLD AUTO: 38.2 % (ref 36–46)
HGB BLD-MCNC: 12.7 G/DL (ref 12–16)
LYMPHOCYTES NFR BLD: 3.8 K/UL (ref 1–4.8)
LYMPHOCYTES RELATIVE PERCENT: 29 % (ref 24–44)
MCH RBC QN AUTO: 29.6 PG (ref 26–34)
MCHC RBC AUTO-ENTMCNC: 33.2 G/DL (ref 31–37)
MCV RBC AUTO: 89 FL (ref 80–100)
MONOCYTES NFR BLD: 0.6 K/UL (ref 0.1–1.3)
MONOCYTES NFR BLD: 4 % (ref 1–7)
NEUTROPHILS NFR BLD: 66 % (ref 36–66)
NEUTS SEG NFR BLD: 8.8 K/UL (ref 1.3–9.1)
PLATELET # BLD AUTO: 286 K/UL (ref 150–450)
PMV BLD AUTO: 7.5 FL (ref 6–12)
POTASSIUM SERPL-SCNC: 3.7 MMOL/L (ref 3.7–5.3)
RBC # BLD AUTO: 4.29 M/UL (ref 4–5.2)
SODIUM SERPL-SCNC: 137 MMOL/L (ref 135–144)
WBC OTHER # BLD: 13.3 K/UL (ref 3.5–11)

## 2024-09-26 PROCEDURE — 36415 COLL VENOUS BLD VENIPUNCTURE: CPT

## 2024-09-26 PROCEDURE — 87086 URINE CULTURE/COLONY COUNT: CPT

## 2024-09-26 PROCEDURE — 80048 BASIC METABOLIC PNL TOTAL CA: CPT

## 2024-09-26 PROCEDURE — 84146 ASSAY OF PROLACTIN: CPT

## 2024-09-26 PROCEDURE — 85025 COMPLETE CBC W/AUTO DIFF WBC: CPT

## 2024-09-27 LAB
MICROORGANISM SPEC CULT: NORMAL
PROLACTIN SERPL-MCNC: 78.5 NG/ML (ref 4.79–23.3)
SPECIMEN DESCRIPTION: NORMAL

## 2024-10-28 ENCOUNTER — OFFICE VISIT (OUTPATIENT)
Dept: UROLOGY | Age: 29
End: 2024-10-28
Payer: COMMERCIAL

## 2024-10-28 VITALS
HEART RATE: 85 BPM | TEMPERATURE: 97.7 F | OXYGEN SATURATION: 97 % | WEIGHT: 266.6 LBS | BODY MASS INDEX: 49.06 KG/M2 | HEIGHT: 62 IN | SYSTOLIC BLOOD PRESSURE: 112 MMHG | DIASTOLIC BLOOD PRESSURE: 60 MMHG

## 2024-10-28 DIAGNOSIS — R35.0 FREQUENCY OF URINATION: ICD-10-CM

## 2024-10-28 DIAGNOSIS — R35.1 NOCTURIA: ICD-10-CM

## 2024-10-28 DIAGNOSIS — N39.41 URGE INCONTINENCE: Primary | ICD-10-CM

## 2024-10-28 DIAGNOSIS — N39.44 NOCTURNAL ENURESIS: ICD-10-CM

## 2024-10-28 PROCEDURE — 99204 OFFICE O/P NEW MOD 45 MIN: CPT | Performed by: UROLOGY

## 2024-10-28 PROCEDURE — G8484 FLU IMMUNIZE NO ADMIN: HCPCS | Performed by: UROLOGY

## 2024-10-28 PROCEDURE — G8427 DOCREV CUR MEDS BY ELIG CLIN: HCPCS | Performed by: UROLOGY

## 2024-10-28 PROCEDURE — G8419 CALC BMI OUT NRM PARAM NOF/U: HCPCS | Performed by: UROLOGY

## 2024-10-28 PROCEDURE — 4004F PT TOBACCO SCREEN RCVD TLK: CPT | Performed by: UROLOGY

## 2024-10-28 RX ORDER — MIRABEGRON 50 MG/1
50 TABLET, FILM COATED, EXTENDED RELEASE ORAL DAILY
Qty: 30 TABLET | Refills: 5 | Status: SHIPPED | OUTPATIENT
Start: 2024-10-28

## 2024-10-28 RX ORDER — MELOXICAM 15 MG/1
15 TABLET ORAL DAILY
COMMUNITY
Start: 2024-09-30

## 2024-10-28 RX ORDER — ATOMOXETINE 100 MG/1
100 CAPSULE ORAL DAILY
COMMUNITY
Start: 2024-10-25

## 2024-10-28 RX ORDER — HYDROXYZINE HYDROCHLORIDE 25 MG/1
25 TABLET, FILM COATED ORAL 3 TIMES DAILY
COMMUNITY
Start: 2024-10-22

## 2024-10-28 NOTE — PROGRESS NOTES
[facility] PHYSICIANS [facility]  [address]  [facility] [address]  Dept: [phone]    [facility] Urology Office Note - New Patient    Patient:  Sulema Macdonald  YOB: 1995  Date: 10/28/2024    The patient is a 29 y.o. female who presentstoday for evaluation of the following problems:   Chief Complaint   Patient presents with    New Patient    Incontinence    referred by Eric Urban MD.    HPI  This is a 29-year-old female who has a history of urgency and frequency as well as urge incontinence.  This is gotten progressively worse over the last several years.  She does wear depends and goes through a few per day.  She thinks she tried an overactive bladder medication as a child and thinks that this helped her at that time.  She has not been on any overactive bladder medications recently.    (Patient's old records have been requested, reviewed and summarized in today's note.)    Summary of old records: N/A    History: N/A    ProceduresToday: N/A    Urinalysis today:  No results found for this visit on 10/28/24.    AUA Symptom Score (10/28/2024):                               Last BUN andcreatinine:  Lab Results   Component Value Date    BUN 16 09/30/2024     Lab Results   Component Value Date    CREATININE 0.98 09/30/2024       Additional Lab/Culture results: none    Reviewed during this Office Visit: none  (results were independently reviewed byphysician and radiology report verified)    PAST MEDICAL, FAMILY AND SOCIAL HISTORY:  Past Medical History:   Diagnosis Date    Anxiety     Asthma     Chronic diarrhea     Depression     Headache     HPV (human papilloma virus) infection     IBS (irritable bowel syndrome)     Nausea     PTSD (post-traumatic stress disorder)      Past Surgical History:   Procedure Laterality Date    CHOLECYSTECTOMY, LAPAROSCOPIC N/A 6/22/2020    CHOLECYSTECTOMY LAPAROSCOPIC performed by Marce Banuelos MD at [facility]

## 2024-11-07 ENCOUNTER — TELEPHONE (OUTPATIENT)
Dept: UROLOGY | Age: 29
End: 2024-11-07

## 2024-11-07 NOTE — TELEPHONE ENCOUNTER
Orders received from [facility] for incontinence supplies.  This is filled out and put into Dr Coreas's folder for signature.    Pt LM asking about orders being faxed and being sent back in.  She needs supplies ASAP. Pt is called and advised that we did receive the order request and it would be given to  when he returns to office on Monday. She verbalizes understanding.

## 2024-11-11 ENCOUNTER — HOSPITAL ENCOUNTER (EMERGENCY)
Age: 29
Discharge: HOME | End: 2024-11-11
Attending: EMERGENCY MEDICINE
Payer: COMMERCIAL

## 2024-11-11 VITALS
SYSTOLIC BLOOD PRESSURE: 123 MMHG | RESPIRATION RATE: 20 BRPM | BODY MASS INDEX: 48.76 KG/M2 | HEART RATE: 87 BPM | TEMPERATURE: 98 F | WEIGHT: 265 LBS | DIASTOLIC BLOOD PRESSURE: 74 MMHG | HEIGHT: 62 IN | OXYGEN SATURATION: 96 %

## 2024-11-11 DIAGNOSIS — K04.7 DENTAL ABSCESS: Primary | ICD-10-CM

## 2024-11-11 PROCEDURE — 6370000000 HC RX 637 (ALT 250 FOR IP): Performed by: PHYSICIAN ASSISTANT

## 2024-11-11 PROCEDURE — 99283 EMERGENCY DEPT VISIT LOW MDM: CPT

## 2024-11-11 RX ORDER — IBUPROFEN 800 MG/1
800 TABLET, FILM COATED ORAL EVERY 8 HOURS PRN
Qty: 20 TABLET | Refills: 0 | Status: SHIPPED | OUTPATIENT
Start: 2024-11-11

## 2024-11-11 RX ORDER — CLINDAMYCIN HYDROCHLORIDE 300 MG/1
300 CAPSULE ORAL 3 TIMES DAILY
Qty: 30 CAPSULE | Refills: 0 | Status: SHIPPED | OUTPATIENT
Start: 2024-11-11 | End: 2024-11-21

## 2024-11-11 RX ORDER — LIDOCAINE HYDROCHLORIDE 20 MG/ML
15 SOLUTION OROPHARYNGEAL
Status: DISCONTINUED | OUTPATIENT
Start: 2024-11-11 | End: 2024-11-11 | Stop reason: HOSPADM

## 2024-11-11 RX ADMIN — LIDOCAINE HYDROCHLORIDE 15 ML: 20 SOLUTION ORAL at 17:25

## 2024-11-11 NOTE — ED PROVIDER NOTES
[facility] ED  eMERGENCY dEPARTMENT eNCOUnter      Pt Name: Sulema Macdonald  MRN: [id number]  Birthdate 1995  Date of evaluation: 11/11/24      CHIEF COMPLAINT:   Chief Complaint   Patient presents with    Dental Pain     HISTORY OF PRESENT ILLNESS    Sulema Macdonald is a 29 y.o. female who presents with complaints of right upper dental pain x this morning.  Pt states there was an abscess above the canine tooth.  Her wife popped it and there was some drainage and bleeding.  Pain became worse after popping the abscess.  Pain is now radiating up the right side of face into the sinuses.  She has taken tylenol with no improvement.    Denies n/v/f/c/abd pain/CP/SOB.        REVIEW OF SYSTEMS     Constitutional: Denies recent fever, chills.  Eyes: No visual changes.    Neck: No neck pain.   Respiratory: Denies recent shortness of breath.    Cardiac:  Denies recent chest pain.    GI: denies any recent abdominal pain nausea or vomiting.  Denies Blood in the stool or black tarry stools.    : denies dysuria.    Musculoskeletal: Denies focal weakness.    Neurologic: denies headache or focal weakness.    Skin:  Denies any rash.      Negative in 10 essential Systems except as mentioned above and in the HPI.      PAST MEDICAL HISTORY   PMH:  has a past medical history of Anxiety, Asthma, Chronic diarrhea, Depression, Headache, HPV (human papilloma virus) infection, IBS (irritable bowel syndrome), Nausea, and PTSD (post-traumatic stress disorder). none otherwise stated in nurses notes  Surgical History:  has a past surgical history that includes [place] tooth extraction; Tongue surgery; Cholecystectomy, laparoscopic (N/A, 6/22/2020); Cholecystectomy, laparoscopic (06/2020); and Colonoscopy (N/A, 4/7/2021). none otherwise stated in nurses notes  Social History:  reports that she has been smoking cigarettes and e-cigarettes. She started smoking about 18 years ago. She has a 18.9 pack-year smoking history. She has never

## 2024-11-11 NOTE — ED TRIAGE NOTES
Mode of arrival (squad #, walk in, police, etc) : walk-in        Chief complaint(s): tooth pain        Arrival Note (brief scenario, treatment PTA, etc).: Pt reports above symptoms that started this morning         C= \"Have you ever felt that you should Cut down on your drinking?\"  No  A= \"Have people Annoyed you by criticizing your drinking?\"  No  G= \"Have you ever felt bad or Guilty about your drinking?\"  No  E= \"Have you ever had a drink as an Eye-opener first thing in the morning to steady your nerves or to help a hangover?\"  No      Deferred []      Reason for deferring: N/A    *If yes to two or more: probable alcohol abuse.*

## 2024-11-11 NOTE — ED PROVIDER NOTES
[facility] ED  eMERGENCY dEPARTMENT eNCOUnter   Independent Attestation     Pt Name: Sulema Macdonald  MRN: [id number]  Birthdate 1995  Date of evaluation: 11/11/24       Sulema Macdonald is a 29 y.o. female who presents with Dental Pain        Based on the medical record, the care appears appropriate. I was personally available for consultation in the Emergency Department.    Aleksandr Garza MD  Attending Emergency  Physician                Aleksandr Garza MD  11/11/24 6408

## 2024-11-11 NOTE — DISCHARGE INSTRUCTIONS
only [phone]  Meir Olmedo  [address]  [phone]  Abraham William  [address], [place]  [phone]      Dentists (Non-Medicaid Patients)    Juan Stinson  [address], [place]  [phone]  Chuck Liang  [address] [facility]  [phone]    Oral Surgeons    Dr Jha and Dr Leavitt  [address] [facility] Takes [place] advantage and Medicaid [phone]  Dr Olvera and Dr Shen  [address] [facility] Takes [place] Medicaid patients [phone]    Miscellaneous Information    [facility] First Call for Help  [phone]  Call for an appointment  H.E.L.P   (Hospital Eligibility Link Program)  [phone]   toll free [phone] For financial assistance

## 2024-12-16 ENCOUNTER — OFFICE VISIT (OUTPATIENT)
Dept: UROLOGY | Age: 29
End: 2024-12-16
Payer: COMMERCIAL

## 2024-12-16 VITALS
WEIGHT: 265 LBS | DIASTOLIC BLOOD PRESSURE: 82 MMHG | SYSTOLIC BLOOD PRESSURE: 124 MMHG | HEIGHT: 62 IN | TEMPERATURE: 97.8 F | OXYGEN SATURATION: 98 % | BODY MASS INDEX: 48.76 KG/M2 | HEART RATE: 98 BPM

## 2024-12-16 DIAGNOSIS — R35.0 FREQUENCY OF URINATION: ICD-10-CM

## 2024-12-16 DIAGNOSIS — N39.41 URGE INCONTINENCE: Primary | ICD-10-CM

## 2024-12-16 DIAGNOSIS — N39.44 NOCTURNAL ENURESIS: ICD-10-CM

## 2024-12-16 PROCEDURE — G8417 CALC BMI ABV UP PARAM F/U: HCPCS | Performed by: UROLOGY

## 2024-12-16 PROCEDURE — G8427 DOCREV CUR MEDS BY ELIG CLIN: HCPCS | Performed by: UROLOGY

## 2024-12-16 PROCEDURE — G8484 FLU IMMUNIZE NO ADMIN: HCPCS | Performed by: UROLOGY

## 2024-12-16 PROCEDURE — 99214 OFFICE O/P EST MOD 30 MIN: CPT | Performed by: UROLOGY

## 2024-12-16 PROCEDURE — 4004F PT TOBACCO SCREEN RCVD TLK: CPT | Performed by: UROLOGY

## 2024-12-16 ASSESSMENT — ENCOUNTER SYMPTOMS
SHORTNESS OF BREATH: 0
BACK PAIN: 0
COUGH: 0
NAUSEA: 0
RESPIRATORY NEGATIVE: 1
GASTROINTESTINAL NEGATIVE: 1
EYE REDNESS: 0
EYE PAIN: 0
VOMITING: 0
ABDOMINAL PAIN: 0
ALLERGIC/IMMUNOLOGIC NEGATIVE: 1
WHEEZING: 0
EYES NEGATIVE: 1

## 2024-12-16 NOTE — PROGRESS NOTES
Review of Systems   Constitutional: Negative.  Negative for activity change, chills and fever.   HENT: Negative.     Eyes: Negative.  Negative for pain, redness and visual disturbance.   Respiratory: Negative.  Negative for cough, shortness of breath and wheezing.    Cardiovascular: Negative.  Negative for chest pain and leg swelling.   Gastrointestinal: Negative.  Negative for abdominal pain, nausea and vomiting.   Endocrine: Negative.    Genitourinary:  Positive for urgency. Negative for difficulty urinating, dysuria, enuresis, flank pain, frequency and hematuria.   Musculoskeletal: Negative.  Negative for back pain, joint swelling and myalgias.   Skin: Negative.  Negative for rash and wound.   Allergic/Immunologic: Negative.    Neurological: Negative.  Negative for dizziness, tremors and numbness.   Hematological: Negative.  Does not bruise/bleed easily.   Psychiatric/Behavioral: Negative.       
Never       REVIEW OF SYSTEMS:  Review of Systems      Physical Exam:      Vitals:    12/16/24 0831   BP: 124/82   Pulse: 98   Temp: 97.8 °F (36.6 °C)   SpO2: 98%     Body mass index is 48.47 kg/m².  Patient is a 29 y.o. female in noacute distress and alert and oriented to person, place and time.  Physical Exam  Constitutional: Patient in no acute distress.  Neuro: Alert andoriented to person, place and time.  Psych: Mood normal, affect normal  Skin: No rash noted  HEENT: Head: Normocephalic and atraumatic        and Plan      1. Urge incontinence    2. Frequency of urination    3. Nocturnal enuresis           Plan:   Cont myrbetriq  F/u 3 mo  If plateaus and not resolved, will consider botox/sns    Assessment & Plan   Return in about 3 months (around 3/16/2025).    Prescriptions Ordered:  No orders of the defined types were placed in this encounter.     Orders Placed:  No orders of the defined types were placed in this encounter.           Tyrone Coreas MD    Agree with the ROS entered by the MA.

## 2024-12-27 ENCOUNTER — HOSPITAL ENCOUNTER (EMERGENCY)
Age: 29
Discharge: HOME | End: 2024-12-27
Attending: EMERGENCY MEDICINE
Payer: COMMERCIAL

## 2024-12-27 VITALS
HEART RATE: 90 BPM | SYSTOLIC BLOOD PRESSURE: 142 MMHG | HEIGHT: 62 IN | TEMPERATURE: 97.2 F | BODY MASS INDEX: 46.01 KG/M2 | RESPIRATION RATE: 15 BRPM | DIASTOLIC BLOOD PRESSURE: 103 MMHG | OXYGEN SATURATION: 98 % | WEIGHT: 250 LBS

## 2024-12-27 DIAGNOSIS — S46.211A BICEPS STRAIN, RIGHT, INITIAL ENCOUNTER: Primary | ICD-10-CM

## 2024-12-27 DIAGNOSIS — M77.11 RIGHT TENNIS ELBOW: ICD-10-CM

## 2024-12-27 PROCEDURE — 99283 EMERGENCY DEPT VISIT LOW MDM: CPT

## 2024-12-27 PROCEDURE — 6370000000 HC RX 637 (ALT 250 FOR IP): Performed by: PHYSICIAN ASSISTANT

## 2024-12-27 RX ORDER — IBUPROFEN 800 MG/1
800 TABLET, FILM COATED ORAL ONCE
Status: COMPLETED | OUTPATIENT
Start: 2024-12-27 | End: 2024-12-27

## 2024-12-27 RX ORDER — IBUPROFEN 800 MG/1
800 TABLET, FILM COATED ORAL EVERY 8 HOURS PRN
Qty: 30 TABLET | Refills: 0 | Status: SHIPPED | OUTPATIENT
Start: 2024-12-27

## 2024-12-27 RX ADMIN — IBUPROFEN 800 MG: 800 TABLET, FILM COATED ORAL at 19:21

## 2024-12-27 ASSESSMENT — PAIN - FUNCTIONAL ASSESSMENT: PAIN_FUNCTIONAL_ASSESSMENT: 0-10

## 2024-12-27 ASSESSMENT — PAIN SCALES - GENERAL
PAINLEVEL_OUTOF10: 9
PAINLEVEL_OUTOF10: 8

## 2024-12-27 ASSESSMENT — PAIN DESCRIPTION - LOCATION
LOCATION: ARM
LOCATION: ARM

## 2024-12-27 ASSESSMENT — PAIN DESCRIPTION - ORIENTATION
ORIENTATION: RIGHT
ORIENTATION: RIGHT

## 2024-12-27 ASSESSMENT — PAIN DESCRIPTION - DESCRIPTORS: DESCRIPTORS: STABBING

## 2024-12-28 NOTE — ED PROVIDER NOTES
eMERGENCY dEPARTMENT  Attending Physician Attestation     Pt Name: Sulema Macdonald  MRN: [id number]  Birthdate 1995  Date of evaluation: 12/27/24     Sulema Macdonald is a 29 y.o. female with CC: Arm Pain (Patient reports carrying laundry baskets and thinks she pulled two muscles in her right arm- reports this occurred two days ago. Denies numbness/tingling)      Based on the medical record the care appears appropriate.  I was personally available for consultation in the Emergency Department.    Ml Qureshi MD  Attending Emergency Physician                  Ml Qureshi MD  12/27/24 4845    
CHOLECYSTECTOMY LAPAROSCOPIC performed by Marce Banuelos MD at [facility] OR    CHOLECYSTECTOMY, LAPAROSCOPIC  06/2020    COLONOSCOPY N/A 4/7/2021    COLONOSCOPY WITH BIOPSY performed by Dionicio Eid MD at [facility] ENDO    TONGUE SURGERY      WISDOM TOOTH EXTRACTION       CURRENT MEDICATIONS       No current facility-administered medications on file prior to encounter.     Current Outpatient Medications on File Prior to Encounter   Medication Sig Dispense Refill    hydrOXYzine HCl (ATARAX) 25 MG tablet Take 1 tablet by mouth in the morning, at noon, and at bedtime      atomoxetine (STRATTERA) 100 MG capsule Take 1 capsule by mouth daily      MYRBETRIQ 50 MG TB24 Take 50 mg by mouth daily 30 tablet 5    naproxen (NAPROSYN) 500 MG tablet Take 1 tablet by mouth 2 times daily 20 tablet 0    gabapentin (NEURONTIN) 800 MG tablet Take 1 tablet by mouth 3 times daily.      prazosin (MINIPRESS) 2 MG capsule       omeprazole (PRILOSEC) 40 MG delayed release capsule Take 1 capsule by mouth Daily      topiramate (TOPAMAX) 50 MG tablet Take by mouth 2 times daily      DULoxetine (CYMBALTA) 60 MG extended release capsule Take 1 capsule by mouth daily      mirtazapine (REMERON) 45 MG tablet Take 1 tablet by mouth nightly      risperiDONE (RISPERDAL) 2 MG tablet Take 1 tablet by mouth 2 times daily      risperiDONE (RISPERDAL) 2 MG tablet Take 1 tablet by mouth 2 times daily      albuterol sulfate  (90 Base) MCG/ACT inhaler Inhale 2 puffs into the lungs every 4 hours as needed for Wheezing 1 Inhaler 0     ALLERGIES     is allergic to latex, gentamicin, and hydrocodone.  FAMILY HISTORY     She indicated that the status of her mother is unknown. She indicated that the status of her father is unknown. She indicated that the status of her maternal grandmother is unknown. She indicated that the status of her maternal grandfather is unknown.     SOCIAL HISTORY       Social History     Tobacco Use    Smoking status: Every Day     Current

## 2025-01-06 ENCOUNTER — OFFICE VISIT (OUTPATIENT)
Dept: ORTHOPEDIC SURGERY | Age: 30
End: 2025-01-06

## 2025-01-06 VITALS — BODY MASS INDEX: 46.01 KG/M2 | HEIGHT: 62 IN | WEIGHT: 250 LBS

## 2025-01-06 DIAGNOSIS — M77.11 LATERAL EPICONDYLITIS OF RIGHT ELBOW: ICD-10-CM

## 2025-01-06 DIAGNOSIS — M25.521 RIGHT ELBOW PAIN: ICD-10-CM

## 2025-01-06 DIAGNOSIS — M77.11 LATERAL EPICONDYLITIS OF RIGHT ELBOW: Primary | ICD-10-CM

## 2025-01-06 DIAGNOSIS — S46.211A STRAIN OF RIGHT BICEPS, INITIAL ENCOUNTER: Primary | ICD-10-CM

## 2025-01-06 DIAGNOSIS — S46.211A STRAIN OF RIGHT BICEPS, INITIAL ENCOUNTER: ICD-10-CM

## 2025-01-06 RX ORDER — DICLOFENAC SODIUM 75 MG/1
75 TABLET, DELAYED RELEASE ORAL 2 TIMES DAILY WITH MEALS
Qty: 28 TABLET | Refills: 0 | Status: SHIPPED | OUTPATIENT
Start: 2025-01-06 | End: 2025-01-20

## 2025-01-06 SDOH — HEALTH STABILITY: PHYSICAL HEALTH: ON AVERAGE, HOW MANY DAYS PER WEEK DO YOU ENGAGE IN MODERATE TO STRENUOUS EXERCISE (LIKE A BRISK WALK)?: 6 DAYS

## 2025-01-06 SDOH — HEALTH STABILITY: PHYSICAL HEALTH: ON AVERAGE, HOW MANY MINUTES DO YOU ENGAGE IN EXERCISE AT THIS LEVEL?: 150+ MIN

## 2025-01-06 NOTE — PROGRESS NOTES
Orthopedic Elbow Encounter Note - New Patient    Chief complaint: Right elbow pain    HPI: Sulema Macdonald is a 29 y.o. right-hand dominant female who presents for evaluation of Her right elbow.  Patient states she has been dealing with pain into the right elbow region for about the past 2 weeks.  She states that she was doing laundry and carrying/throwing a bag at close up and down and states she believes she may have hyperextended her elbow.  Since that time she has had pain over the lateral and anterior aspects of the elbow.  Denies any numbness or tingling.  Denies feeling any pop, noticing any bruising, swelling to the elbow region.  States that the pain has not gotten much better since the time of injury and has been trying to manage it with ibuprofen without adequate relief.  Denies any radiation of the pain down into the hand but does endorse pain into the dorsal aspect of her forearm.    Previous treatment:    NSAIDs: Ibuprofen    Injections: None    Physical therapy: None    Surgeries: None    Review of Systems:     Constitution: no fever or chills   Pain level: 9/10  Musculoskeletal: As noted in the HPI   Neurologic: no neurologic symptoms    Past Medical Hx, Past Surgical Hx, Medications, Allergies, Social Hx: These were all reviewed. Please refer to Electronic Medical Records for details.     Physical Exam:     Ht 1.575 m (5' 2\") Comment: per pt  Wt 113.4 kg (250 lb) Comment: per pt  BMI 45.73 kg/m²    General Appearance: alert, well appearing, and in no distress  Mental Status: alert, oriented to person, place, and time  Gait: normal  Head and neck: Within normal limits  Thoracic spine: Within normal limits    Elbows:    Skin: warm and dry, no rash or erythema.  No ecchymosis, swelling or obvious deformity  Vasculature: 2+ radial pulses bilaterally  Sensation: Intact to light touch in radial, ulnar, and median nerve distributions bilaterally    ROM:

## 2025-01-06 NOTE — TELEPHONE ENCOUNTER
Voltaren 75 mg pended for right distal biceps strain and right elbow lateral epicondylitis. 1/6/25 office visit.

## 2025-01-20 DIAGNOSIS — M77.11 LATERAL EPICONDYLITIS OF RIGHT ELBOW: ICD-10-CM

## 2025-01-20 DIAGNOSIS — S46.211A STRAIN OF RIGHT BICEPS, INITIAL ENCOUNTER: ICD-10-CM

## 2025-01-20 RX ORDER — DICLOFENAC SODIUM 75 MG/1
75 TABLET, DELAYED RELEASE ORAL 2 TIMES DAILY WITH MEALS
Qty: 28 TABLET | Refills: 0 | Status: SHIPPED | OUTPATIENT
Start: 2025-01-20 | End: 2025-02-03

## 2025-01-20 NOTE — TELEPHONE ENCOUNTER
Pt called in is requesting a refill on medicine diclofenac (VOLTAREN) 75 MG EC tablet      Please send to       [id number] - [place], [place] - [address] [phone] - F [phone]       Please call pt with any questions @ [phone]

## 2025-01-20 NOTE — TELEPHONE ENCOUNTER
This is a patient of Aidan. Last office visit with Aidan was on 1/6/25 and he prescribed Voltaren. This is patient's first refill request.

## 2025-02-06 ENCOUNTER — OFFICE VISIT (OUTPATIENT)
Dept: ORTHOPEDIC SURGERY | Age: 30
End: 2025-02-06
Payer: COMMERCIAL

## 2025-02-06 VITALS — HEIGHT: 62 IN | RESPIRATION RATE: 14 BRPM | WEIGHT: 250 LBS | BODY MASS INDEX: 46.01 KG/M2

## 2025-02-06 DIAGNOSIS — M77.11 LATERAL EPICONDYLITIS OF RIGHT ELBOW: Primary | ICD-10-CM

## 2025-02-06 PROCEDURE — G8417 CALC BMI ABV UP PARAM F/U: HCPCS

## 2025-02-06 PROCEDURE — 4004F PT TOBACCO SCREEN RCVD TLK: CPT

## 2025-02-06 PROCEDURE — G8428 CUR MEDS NOT DOCUMENT: HCPCS

## 2025-02-06 RX ORDER — TRIAMCINOLONE ACETONIDE 40 MG/ML
40 INJECTION, SUSPENSION INTRA-ARTICULAR; INTRAMUSCULAR ONCE
Status: COMPLETED | OUTPATIENT
Start: 2025-02-06 | End: 2025-02-06

## 2025-02-06 RX ORDER — LIDOCAINE HYDROCHLORIDE 10 MG/ML
1 INJECTION, SOLUTION INFILTRATION; PERINEURAL ONCE
Status: COMPLETED | OUTPATIENT
Start: 2025-02-06 | End: 2025-02-06

## 2025-02-06 RX ADMIN — LIDOCAINE HYDROCHLORIDE 1 ML: 10 INJECTION, SOLUTION INFILTRATION; PERINEURAL at 13:20

## 2025-02-06 RX ADMIN — TRIAMCINOLONE ACETONIDE 40 MG: 40 INJECTION, SUSPENSION INTRA-ARTICULAR; INTRAMUSCULAR at 13:20

## 2025-02-06 NOTE — PROGRESS NOTES
HPI: Ms. Macdonald is a 29-year-old female who presented to clinic today for follow-up regarding her right elbow pain.  When I last saw the patient I diagnosed her with what appeared to be a distal biceps strain as well as a lateral epicondylitis.  We did initiate conservative treatment at that time with oral Voltaren tablets as well as a wrist brace and starting her with some home exercises.  She presents today stating that the Voltaren tablets have helped improve her pain as well as the swelling that she noticed with her initial injury.  She does state however that she is still having difficulty with lifting heavier items or gripping certain things due to the pain.  She localizes the pain however today more to the lateral aspect of her elbow and radiating down into the extensor aspect of the forearm.  An evaluation of the patient's right elbow and upper extremity it does demonstrate intact skin without warmth, erythema or ecchymosis.  No significant swelling present.  She is significantly tender to palpation at the level near the lateral epicondyle.  No medial or posterior elbow tenderness.  Triceps and biceps tendons are intact to palpation.  Negative hook test.  She is nontender over the distal biceps muscle belly.  She does have some pain with resisted wrist extension as well as resisted supination.  No pain or dysfunction noted with resisted wrist flexion/pronation.    Imaging: No new imaging obtained in office today.    Impression/plan:  Ms. Macdonald is a 29-year-old female who presented to clinic today for follow-up regarding her right elbow pain.  I did have a discussion with the patient today that it does appear she is still dealing with a lateral epicondylitis at this time.  The distal biceps is not tender and she has good strength so it does appear that the strain in the biceps that she was dealing with has resolved.  Did have a discussion with her about treatment options moving forward both continued

## 2025-02-17 DIAGNOSIS — M77.11 LATERAL EPICONDYLITIS OF RIGHT ELBOW: Primary | ICD-10-CM

## 2025-02-17 DIAGNOSIS — M25.521 RIGHT ELBOW PAIN: ICD-10-CM

## 2025-02-17 RX ORDER — DICLOFENAC SODIUM 75 MG/1
75 TABLET, DELAYED RELEASE ORAL 2 TIMES DAILY WITH MEALS
Qty: 28 TABLET | Refills: 0 | Status: SHIPPED | OUTPATIENT
Start: 2025-02-17 | End: 2025-03-03

## 2025-02-17 NOTE — TELEPHONE ENCOUNTER
Aidan,     Patient reports that she received 0% pain relief from right lateral epicondyle injection performed on 2/6/25. Patient reports she experienced increased pain for 5-6 days following injection. Inquiring about next steps as pain is immense. Please advise.

## 2025-02-17 NOTE — TELEPHONE ENCOUNTER
Patient called in stating cortisone injection (02.06.25) did not work and was told to call if it did not  Please return call  Thank you

## 2025-03-07 DIAGNOSIS — M77.11 LATERAL EPICONDYLITIS OF RIGHT ELBOW: ICD-10-CM

## 2025-03-07 DIAGNOSIS — M25.521 RIGHT ELBOW PAIN: ICD-10-CM

## 2025-03-07 RX ORDER — DICLOFENAC SODIUM 75 MG/1
75 TABLET, DELAYED RELEASE ORAL 2 TIMES DAILY WITH MEALS
Qty: 28 TABLET | Refills: 0 | Status: SHIPPED | OUTPATIENT
Start: 2025-03-07 | End: 2025-03-21

## 2025-03-07 NOTE — TELEPHONE ENCOUNTER
Pt is requesting -Voltaren 75 MG   Last filled -2/17/25 by Aidan  Last visit - 2/6/25 with Aidan

## 2025-03-17 ENCOUNTER — OFFICE VISIT (OUTPATIENT)
Dept: UROLOGY | Age: 30
End: 2025-03-17
Payer: COMMERCIAL

## 2025-03-17 VITALS
BODY MASS INDEX: 46.01 KG/M2 | DIASTOLIC BLOOD PRESSURE: 78 MMHG | SYSTOLIC BLOOD PRESSURE: 118 MMHG | WEIGHT: 250 LBS | HEART RATE: 86 BPM | TEMPERATURE: 98 F | OXYGEN SATURATION: 97 % | HEIGHT: 62 IN

## 2025-03-17 DIAGNOSIS — N39.41 URGE INCONTINENCE: Primary | ICD-10-CM

## 2025-03-17 DIAGNOSIS — R35.0 FREQUENCY OF URINATION: ICD-10-CM

## 2025-03-17 DIAGNOSIS — N39.44 NOCTURNAL ENURESIS: ICD-10-CM

## 2025-03-17 PROCEDURE — 4004F PT TOBACCO SCREEN RCVD TLK: CPT | Performed by: UROLOGY

## 2025-03-17 PROCEDURE — G8427 DOCREV CUR MEDS BY ELIG CLIN: HCPCS | Performed by: UROLOGY

## 2025-03-17 PROCEDURE — 99213 OFFICE O/P EST LOW 20 MIN: CPT | Performed by: UROLOGY

## 2025-03-17 PROCEDURE — G8417 CALC BMI ABV UP PARAM F/U: HCPCS | Performed by: UROLOGY

## 2025-03-17 RX ORDER — DEXTROAMPHETAMINE SACCHARATE, AMPHETAMINE ASPARTATE MONOHYDRATE, DEXTROAMPHETAMINE SULFATE AND AMPHETAMINE SULFATE 2.5; 2.5; 2.5; 2.5 MG/1; MG/1; MG/1; MG/1
CAPSULE, EXTENDED RELEASE ORAL
COMMUNITY
Start: 2025-02-17

## 2025-03-17 RX ORDER — CLONAZEPAM 0.5 MG/1
TABLET ORAL
COMMUNITY
Start: 2025-02-28

## 2025-03-17 RX ORDER — TRAMADOL HYDROCHLORIDE 50 MG/1
50 TABLET ORAL 3 TIMES DAILY PRN
COMMUNITY

## 2025-03-17 ASSESSMENT — ENCOUNTER SYMPTOMS
EYE REDNESS: 0
GASTROINTESTINAL NEGATIVE: 1
WHEEZING: 0
ALLERGIC/IMMUNOLOGIC NEGATIVE: 1
SHORTNESS OF BREATH: 0
COUGH: 0
EYES NEGATIVE: 1
EYE PAIN: 0
VOMITING: 0
NAUSEA: 0
RESPIRATORY NEGATIVE: 1
ABDOMINAL PAIN: 0
BACK PAIN: 0

## 2025-03-17 NOTE — PROGRESS NOTES
[facility] PHYSICIANS [facility] UROLOGY CENTER  [address]  [facility] [address]  Dept: [phone]    [facility] Urology Office Note - Established    Patient:  Sulema Macdonald  YOB: 1995  Date: 3/17/2025    The patient is a 29 y.o. female whopresents today for evaluation of the following problems:   Chief Complaint   Patient presents with    Urge incontinence       HPI  This a very pleasant 29-year-old female who has OAB.  She has been on Myrbetriq and has seen improvement.  Her bedwetting has resolved.  She is having few incontinence episodes.She is wearing two diapers per day but they are usually dry.    Summary of old records: N/A    Additional History: N/A    Procedures Today: N/A    Urinalysis today:  No results found for this visit on 03/17/25.    Imaging Reviewed during this Office Visit: none  (results were independently reviewed by physician and radiology report verified)    AUA Symptom Score (3/17/2025):                               Last BUN and creatinine:  Lab Results   Component Value Date    BUN 16 09/30/2024     Lab Results   Component Value Date    CREATININE 0.98 09/30/2024       Additional Lab/Culture results: none    PAST MEDICAL, FAMILY AND SOCIAL HISTORY UPDATE:  Past Medical History:   Diagnosis Date    Anxiety     Asthma     Chronic diarrhea     Depression     Headache     HPV (human papilloma virus) infection     IBS (irritable bowel syndrome)     Nausea     PTSD (post-traumatic stress disorder)      Past Surgical History:   Procedure Laterality Date    CHOLECYSTECTOMY, LAPAROSCOPIC N/A 6/22/2020    CHOLECYSTECTOMY LAPAROSCOPIC performed by Marce Banuelos MD at [facility] OR    CHOLECYSTECTOMY, LAPAROSCOPIC  06/2020    COLONOSCOPY N/A 4/7/2021    COLONOSCOPY WITH BIOPSY performed by Dionicio Eid MD at [facility] ENDO    TONGUE SURGERY      WISDOM TOOTH EXTRACTION       Family History   Problem Relation Age of Onset    Diabetes Father

## 2025-03-18 DIAGNOSIS — M25.521 RIGHT ELBOW PAIN: ICD-10-CM

## 2025-03-18 DIAGNOSIS — M77.11 LATERAL EPICONDYLITIS OF RIGHT ELBOW: ICD-10-CM

## 2025-03-19 RX ORDER — DICLOFENAC SODIUM 75 MG/1
TABLET, DELAYED RELEASE ORAL
Qty: 28 TABLET | Refills: 0 | Status: SHIPPED | OUTPATIENT
Start: 2025-03-19

## 2025-03-19 NOTE — TELEPHONE ENCOUNTER
Pt is requesting a refill of Voltaren 75 mg  Medication was last filled on 3/7/25 by Marlen   Patients last visit  was- 2/6/25 by marlen

## 2025-04-01 ENCOUNTER — TRANSCRIBE ORDERS (OUTPATIENT)
Dept: ADMINISTRATIVE | Age: 30
End: 2025-04-01

## 2025-04-01 DIAGNOSIS — E22.1 HYPERPROLACTINEMIA: Primary | ICD-10-CM

## 2025-04-17 ENCOUNTER — TELEPHONE (OUTPATIENT)
Dept: ORTHOPEDIC SURGERY | Age: 30
End: 2025-04-17

## 2025-04-17 DIAGNOSIS — M77.11 LATERAL EPICONDYLITIS OF RIGHT ELBOW: Primary | ICD-10-CM

## 2025-04-17 DIAGNOSIS — M25.521 RIGHT ELBOW PAIN: ICD-10-CM

## 2025-04-17 DIAGNOSIS — M77.11 LATERAL EPICONDYLITIS OF RIGHT ELBOW: ICD-10-CM

## 2025-04-17 RX ORDER — DICLOFENAC SODIUM 75 MG/1
TABLET, DELAYED RELEASE ORAL
Qty: 28 TABLET | Refills: 0 | OUTPATIENT
Start: 2025-04-17

## 2025-04-17 NOTE — TELEPHONE ENCOUNTER
iAdan,    Patient requesting refill of Voltaren 75 mg 28# 0 refills. Last refill 3/18/25. Last office visit 2/6/25. This is patient's 4th refill request.

## 2025-04-17 NOTE — TELEPHONE ENCOUNTER
Pt has a referral for PT for her right elbow, [facility] believes she would also benefit from OT. They are requesting an order for Occupational Therapy be placed, said same diagnoses are fine. Pt us scheduled for her eval next Friday 4/25/2025.

## 2025-04-18 NOTE — TELEPHONE ENCOUNTER
Occupational therapy for [facility] with Aidan's instructions from earlier message dropped.

## 2025-04-18 NOTE — TELEPHONE ENCOUNTER
Spoke with patient and informed her of the script denial and suggested that she contact her PCP if she is interested in taking voltaren for an extended period of time.

## 2025-04-18 NOTE — TELEPHONE ENCOUNTER
Medication name:MYRBETRIQ   Medication dosage:50mg  Preferred pharmacy:  [facility] #[id number] [place] [address] JACKELYN OKEEFE Beni VARGAS [phone] - F [phone]       Last office visit:3/17/25  Next office (or follow up plan- may copy and paste from last office visit at bottom):  Assessment  1. Urge incontinence    2. Frequency of urination    3. Nocturnal enuresis             Plan:   Discussed sns/botox  Cont myrbetriq  F/u six months  Assessment & Plan  Return in about 6 months (around 9/17/2025).

## 2025-04-21 ENCOUNTER — HOSPITAL ENCOUNTER (OUTPATIENT)
Dept: MRI IMAGING | Age: 30
Discharge: HOME | End: 2025-04-23
Payer: COMMERCIAL

## 2025-04-21 DIAGNOSIS — E22.1 HYPERPROLACTINEMIA: ICD-10-CM

## 2025-04-21 PROCEDURE — 2500000003 HC RX 250 WO HCPCS: Performed by: INTERNAL MEDICINE

## 2025-04-21 PROCEDURE — 6360000004 HC RX CONTRAST MEDICATION: Performed by: INTERNAL MEDICINE

## 2025-04-21 PROCEDURE — 70553 MRI BRAIN STEM W/O & W/DYE: CPT

## 2025-04-21 PROCEDURE — A9579 GAD-BASE MR CONTRAST NOS,1ML: HCPCS | Performed by: INTERNAL MEDICINE

## 2025-04-21 RX ORDER — MIRABEGRON 50 MG/1
50 TABLET, FILM COATED, EXTENDED RELEASE ORAL DAILY
Qty: 30 TABLET | Refills: 5 | Status: SHIPPED | OUTPATIENT
Start: 2025-04-21

## 2025-04-21 RX ORDER — SODIUM CHLORIDE 0.9 % (FLUSH) 0.9 %
10 SYRINGE (ML) INJECTION PRN
Status: DISCONTINUED | OUTPATIENT
Start: 2025-04-21 | End: 2025-04-24 | Stop reason: HOSPADM

## 2025-04-21 RX ADMIN — SODIUM CHLORIDE, PRESERVATIVE FREE 10 ML: 5 INJECTION INTRAVENOUS at 08:56

## 2025-04-21 RX ADMIN — GADOTERIDOL 20 ML: 279.3 INJECTION, SOLUTION INTRAVENOUS at 08:56

## 2025-04-25 ENCOUNTER — HOSPITAL ENCOUNTER (OUTPATIENT)
Dept: OCCUPATIONAL THERAPY | Age: 30
Setting detail: THERAPIES SERIES
Discharge: HOME | End: 2025-04-25
Payer: COMMERCIAL

## 2025-04-25 PROCEDURE — 97166 OT EVAL MOD COMPLEX 45 MIN: CPT

## 2025-04-25 PROCEDURE — 97035 APP MDLTY 1+ULTRASOUND EA 15: CPT

## 2025-04-25 ASSESSMENT — PAIN DESCRIPTION - PAIN TYPE: TYPE: ACUTE PAIN

## 2025-04-25 ASSESSMENT — PAIN DESCRIPTION - LOCATION: LOCATION: ELBOW

## 2025-04-25 ASSESSMENT — PAIN DESCRIPTION - DESCRIPTORS: DESCRIPTORS: ACHING;BURNING;SHARP;THROBBING

## 2025-04-25 ASSESSMENT — 9 HOLE PEG TEST
TESTTIME_SECONDS: 23
TESTTIME_SECONDS: 20
TEST_RESULT: IMPAIRED

## 2025-04-25 ASSESSMENT — PAIN SCALES - GENERAL: PAINLEVEL_OUTOF10: 8

## 2025-04-25 ASSESSMENT — PAIN DESCRIPTION - ORIENTATION: ORIENTATION: RIGHT

## 2025-04-25 NOTE — THERAPY EVALUATION
[facility] Outpatient Rehabilitation and Therapy  Occupational Therapy  [address]    [place], [place] [address]  Phone: [phone]  Fax: [phone]  Occupational Therapy: Initial Evaluation   Patient: Sulema Macdonald (30 y.o. female)   Examination Date: 2025  Plan of Care Certification Period: 2025 to        :  1995  MRN: [id number]  CSN: [id number]   Insurance: Payor: [employer] / Plan: [employer] / Product Type: *No Product type* /   Insurance ID: [id number] - (Medicaid Managed) Secondary Insurance (if applicable):    Insurance Information: [employer].(30 visits). Need authorization after 8th visit  ( benefits)   Referring Physician: Chandana Chavez MD Scott, Logan J PA-C   PCP: Eric Urban MD Visits to Date/Visits Approved:       No Show/Cancelled Appts:   /       Medical Diagnosis: Lateral epicondylitis of right elbow [M77.11] Lateral epicondylitis of rt elbow M77.11)  Treatment Diagnosis: Pain rt elbow/forearm, rt UE weakness, swelling rt elbow, tingling rt hand. (ICD-10 code : M25.521, M62.81,M25.421, R20.2)         PERTINENT MEDICAL HISTORY   Patient assessed for rehabilitation services?: Yes  Self reported health status:: Good    Medical History:     Past Medical History:   Diagnosis Date    Anxiety     Asthma     Chronic diarrhea     Depression     Headache     HPV (human papilloma virus) infection     IBS (irritable bowel syndrome)     Nausea     PTSD (post-traumatic stress disorder)      Surgical History:   Past Surgical History:   Procedure Laterality Date    CHOLECYSTECTOMY, LAPAROSCOPIC N/A 2020    CHOLECYSTECTOMY LAPAROSCOPIC performed by Marce Banuelos MD at [facility] OR    CHOLECYSTECTOMY, LAPAROSCOPIC  2020    COLONOSCOPY N/A 2021    COLONOSCOPY WITH BIOPSY performed by Dionicio Eid MD at [facility] ENDO    TONGUE SURGERY      WISDOM TOOTH EXTRACTION         Medications:

## 2025-04-28 ENCOUNTER — HOSPITAL ENCOUNTER (OUTPATIENT)
Dept: OCCUPATIONAL THERAPY | Age: 30
Setting detail: THERAPIES SERIES
Discharge: HOME | End: 2025-04-28
Payer: COMMERCIAL

## 2025-04-28 PROCEDURE — 97035 APP MDLTY 1+ULTRASOUND EA 15: CPT

## 2025-04-28 PROCEDURE — 97140 MANUAL THERAPY 1/> REGIONS: CPT

## 2025-04-28 PROCEDURE — 97110 THERAPEUTIC EXERCISES: CPT

## 2025-04-28 NOTE — FLOWSHEET NOTE
[facility]  Outpatient Rehabilitation &  Therapy  [address]     [phone]  F: [phone]   [] [facility]  Outpatient Rehabilitation &  Therapy  [address]   [address]  P: [phone]  F: [phone] [] [facility]  Outpatient Rehabilitation &  Therapy  [address]  P: [phone]  F: [phone]  [x] [facility]   Outpatient Rehabilitation & Therapy  [address]  P: [phone]   F: [phone]     Occupational Therapy Daily Treatment Note    Date:  2025  Patient Name:  Sulema Macdonald    :  1995  MRN: [id number]  Physician: Aidan Saunders PA-C      Insurance:  [place] [employer].(30 visits). Need authorization after 8th visit  ( benefits)   Diagnosis:  Lateral epicondylitis of right elbow [M77.11] Lateral epicondylitis of rt elbow M77.11)   Treatment Diagnosis: Pain rt elbow/forearm, rt UE weakness, swelling rt elbow, tingling rt hand. (ICD-10 code : M25.521, M62.81,M25.421, R20.2)    Onset Date: 2024   Thomas Pineda Appt: ?  Visit# / total visits: ; Progress note for  patient due at visit 8    Cancels/No Shows: 0/0      Subjective:    Pain:  [x] Yes  [] No Location: rt elbow & proximal forearm / N/A Pain Rating: (0-10 scale) 7/10 at begin of tx , pain 8/10 tightness & rubber band feeling rt elbow at end of tx.   Pain altered Tx:  [x] No  [] Yes  Action:  Pt Comments:Pt reports rt arm achy/throbby. 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 Pt wears rt black prefab wrist brace during the day with activity.   Exercises:  EXERCISE    REPS/     TIME  WEIGHT/    LEVEL COMMENTS Complete = X  Not done = -   HEP   HEP instruction rt UE stretch ( extend rt elbow with slight flexion & extend wrist (stretch with lt hand ) for 10 seconds, extend rt elbow

## 2025-05-01 ENCOUNTER — APPOINTMENT (OUTPATIENT)
Dept: OCCUPATIONAL THERAPY | Age: 30
End: 2025-05-01
Payer: COMMERCIAL

## 2025-05-01 ENCOUNTER — HOSPITAL ENCOUNTER (OUTPATIENT)
Dept: OCCUPATIONAL THERAPY | Age: 30
Setting detail: THERAPIES SERIES
Discharge: HOME | End: 2025-05-01
Payer: COMMERCIAL

## 2025-05-01 PROCEDURE — 97110 THERAPEUTIC EXERCISES: CPT

## 2025-05-01 PROCEDURE — 97140 MANUAL THERAPY 1/> REGIONS: CPT

## 2025-05-01 PROCEDURE — 97035 APP MDLTY 1+ULTRASOUND EA 15: CPT

## 2025-05-01 NOTE — FLOWSHEET NOTE
[facility]  Outpatient Rehabilitation &  Therapy  [address]     [phone]  F: [phone]   [] [facility]  Outpatient Rehabilitation &  Therapy  [address]   [address]  P: [phone]  F: [phone] [] [facility]  Outpatient Rehabilitation &  Therapy  [address]  P: [phone]  F: [phone]  [x] [facility]   Outpatient Rehabilitation & Therapy  [address]  P: [phone]   F: [phone]     Occupational Therapy Daily Treatment Note    Date:  2025  Patient Name:  Sulema Macdonald    :  1995  MRN: [id number]  Physician: Aidan Saunders PA-C      Insurance:  [place] community [employer] plan.(30 visits). Need authorization after 8th visit  ( benefits)   Diagnosis:  Lateral epicondylitis of right elbow [M77.11] Lateral epicondylitis of rt elbow M77.11)   Treatment Diagnosis: Pain rt elbow/forearm, rt UE weakness, swelling rt elbow, tingling rt hand. (ICD-10 code : M25.521, M62.81,M25.421, R20.2)    Onset Date: 2024   Thomas Pineda Appt: ?  Visit# / total visits: 3/8; Progress note for  patient due at visit 8    Cancels/No Shows: 0/0      Subjective:    Pain:  [x] Yes  [] No Location:  rt elbow/forearm / N/A Pain Rating: (0-10 scale) 7-7.5/10 at begin of tx   Pain altered Tx:  [x] No  [] Yes  Action:  Pt Comments:Pt reports busy work day at [employer] . Pt reports rt arm feeling better, numbness/tingling rt hand is decreasing, swelling decreasing rt elbow.Pt states it feels a little better when she carries bookbag on rt shoulder. Pt has red theraband flexbar at home that she ordered.   Objective:  Modalities:ultrasound  Precautions: none. Pt wears rt black prefab wrist brace during the day with activity.   Exercises:  EXERCISE    REPS/     TIME  WEIGHT/    LEVEL COMMENTS Complete = X  Not done = -   HEP   HEP instruction rt UE stretch ( extend rt elbow with slight flexion & extend wrist (stretch

## 2025-05-05 ENCOUNTER — HOSPITAL ENCOUNTER (OUTPATIENT)
Dept: OCCUPATIONAL THERAPY | Age: 30
Setting detail: THERAPIES SERIES
Discharge: HOME | End: 2025-05-05
Payer: COMMERCIAL

## 2025-05-05 NOTE — FLOWSHEET NOTE
[] [facility]  Outpatient Rehabilitation &  Therapy  [address]  [phone]  F: [phone] [] [facility]  Outpatient Rehabilitation &  Therapy  [address]   [address]  P: [phone]  F: [phone] [] [facility]  Outpatient Rehabilitation &  Therapy  [address]  P: [phone]  F: [phone]  [x] [facility]  Rehabilitation Services  [address]  [place], [place] [address]  Phone [phone]  Fax [phone]      Occupational Therapy Cancel/No Show note    Date: 2025  Patient: Sulema Macdonald  : 1995  MRN: [id number]    Cancels/No Shows to date:     For today's appointment patient:    [x]  Cancelled    [] Rescheduled appointment    [] No-show     Reason given by patient:    []  Patient ill    []  Conflicting appointment    [x] No transportation      [] Conflict with work    [] No reason given    [] Weather related    [] COVID-19    [] Other:      Comments:        [x] Next appointment was confirmed -  confirmed pt's next appt on Thursday.    Electronically signed by: Ana Kapadia OT

## 2025-05-08 ENCOUNTER — HOSPITAL ENCOUNTER (OUTPATIENT)
Dept: OCCUPATIONAL THERAPY | Age: 30
Setting detail: THERAPIES SERIES
Discharge: HOME | End: 2025-05-08
Payer: COMMERCIAL

## 2025-05-08 PROCEDURE — 97035 APP MDLTY 1+ULTRASOUND EA 15: CPT

## 2025-05-08 PROCEDURE — 97140 MANUAL THERAPY 1/> REGIONS: CPT

## 2025-05-08 PROCEDURE — 97110 THERAPEUTIC EXERCISES: CPT

## 2025-05-08 NOTE — FLOWSHEET NOTE
[facility]  Outpatient Rehabilitation &  Therapy  [address]     [phone]  F: [phone]   [] [facility]  Outpatient Rehabilitation &  Therapy  [address]   [address]  P: [phone]  F: [phone] [] [facility]  Outpatient Rehabilitation &  Therapy  [address]  P: [phone]  F: [phone]  [x] [facility]   Outpatient Rehabilitation & Therapy  [address]  P: [phone]   F: [phone]     Occupational Therapy Daily Treatment Note    Date:  2025  Patient Name:  Sulema Macdonald    :  1995  MRN: [id number]  Physician: Aidan Saunders PA-C      Insurance:  [facility] [employer] [facility].(30 visits). Need authorization after 8th visit  ( benefits)   Diagnosis:  Lateral epicondylitis of right elbow [M77.11] Lateral epicondylitis of rt elbow M77.11)   Treatment Diagnosis: Pain rt elbow/forearm, rt UE weakness, swelling rt elbow, tingling rt hand. (ICD-10 code : M25.521, M62.81,M25.421, R20.2)    Onset Date: 2024   Next  Appt: ?  Visit# / total visits: ; Progress note for  patient due at visit 8    Cancels/No Shows: 0/0      Subjective:    Pain:  [x] Yes  [] No Location:  rt elbow/forearm / N/A Pain Rating: (0-10 scale) 6/10   Pain altered Tx:  [x] No  [] Yes  Action:  Pt Comments:Pt reports busy day at work.. Pt states she found out that her insurance company will end her insurance at the end of may.Pt will have to find another insurance then. Pt states she cancel therapy earlier this week d/t no transportation.Pt states she will do lab work after therapy.  Objective:  Modalities:ultrasound  Precautions: none. Pt wears rt black prefab wrist brace during the day with activity.   Exercises:  EXERCISE    REPS/     TIME  WEIGHT/    LEVEL COMMENTS Complete = X  Not done = -   HEP   HEP instruction rt UE stretch ( extend rt elbow with slight flexion & extend wrist (stretch with lt hand )

## 2025-05-12 ENCOUNTER — HOSPITAL ENCOUNTER (OUTPATIENT)
Dept: OCCUPATIONAL THERAPY | Age: 30
Setting detail: THERAPIES SERIES
Discharge: HOME | End: 2025-05-12
Payer: COMMERCIAL

## 2025-05-12 NOTE — FLOWSHEET NOTE
[] [facility]  Outpatient Rehabilitation &  Therapy  [address]  [phone]  F: [phone] [] [facility]  Outpatient Rehabilitation &  Therapy  [address]   [address]  P: [phone]  F: [phone] [] [facility]  Outpatient Rehabilitation &  Therapy  [address]  P: [phone]  F: [phone]  [x] [facility]  Rehabilitation Services  [address]  [place], [place] [address]  Phone [phone]  Fax [phone]      Occupational Therapy Cancel/No Show note    Date: 2025  Patient: Sulema Macdonald  : 1995  MRN: [id number]    Cancels/No Shows to date:     For today's appointment patient:    [x]  Cancelled    [] Rescheduled appointment    [] No-show     Reason given by patient:    []  Patient ill    []  Conflicting appointment    [x] No transportation      [] Conflict with work    [] No reason given    [] Weather related    [] COVID-19    [x] Other:      Comments:   informed OT that pt cancel d/t  missing the bus & no transportation.       [x] Next appointment was confirmed by .    Electronically signed by: Ana Kapadia OT

## 2025-05-15 ENCOUNTER — HOSPITAL ENCOUNTER (OUTPATIENT)
Dept: OCCUPATIONAL THERAPY | Age: 30
Setting detail: THERAPIES SERIES
Discharge: HOME | End: 2025-05-15
Payer: COMMERCIAL

## 2025-05-15 PROCEDURE — 97110 THERAPEUTIC EXERCISES: CPT

## 2025-05-15 PROCEDURE — 97140 MANUAL THERAPY 1/> REGIONS: CPT

## 2025-05-15 PROCEDURE — 97035 APP MDLTY 1+ULTRASOUND EA 15: CPT

## 2025-05-15 NOTE — FLOWSHEET NOTE
[facility]  Outpatient Rehabilitation &  Therapy  [address]     [phone]  F: [phone]   [] [facility]  Outpatient Rehabilitation &  Therapy  [address]   [address]  P: [phone]  F: [phone] [] [facility]  Outpatient Rehabilitation &  Therapy  [address]  P: [phone]  F: [phone]  [x] [facility]   Outpatient Rehabilitation & Therapy  [address]  P: [phone]   F: [phone]     Occupational Therapy Daily Treatment Note    Date:  5/15/2025  Patient Name:  Sulema Macdonald    :  1995  MRN: [id number]  Physician: Aidan Saunders PA-C      Insurance:  [place] community health plan.(30 visits). Need authorization after 8th visit  ( benefits)   Diagnosis:  Lateral epicondylitis of right elbow [M77.11] Lateral epicondylitis of rt elbow M77.11)   Treatment Diagnosis: Pain rt elbow/forearm, rt UE weakness, swelling rt elbow, tingling rt hand. (ICD-10 code : M25.521, M62.81,M25.421, R20.2)    Onset Date: 2024   Thomas Pineda Appt: ?  Visit# / total visits: ; Progress note for  patient due at visit 8    Cancels/No Shows: 2/0      Subjective:    Pain:  [x] Yes  [] No Location:  rt elbow/forearm / N/A Pain Rating: (0-10 scale) 6-7/10   Pain altered Tx:  [x] No  [] Yes  Action:  Pt Comments: Pt states that she got off work early but she was busy when working. Pt reports she will ice rt arm/elbow when she goes home.Pt states that she missed therapy Monday because of transportation & she knows the bus route now. She will have to walk about 8 blocks to catch the bus.  Objective:  Modalities:ultrasound  Precautions: none. Pt wears rt black prefab wrist brace during the day with activity.   Exercises:  EXERCISE    REPS/     TIME  WEIGHT/    LEVEL COMMENTS Complete = X  Not done = -   HEP   HEP instruction rt UE stretch ( extend rt elbow with slight flexion & extend wrist (stretch with lt hand )

## 2025-05-19 ENCOUNTER — HOSPITAL ENCOUNTER (OUTPATIENT)
Dept: OCCUPATIONAL THERAPY | Age: 30
Setting detail: THERAPIES SERIES
Discharge: HOME | End: 2025-05-19
Payer: COMMERCIAL

## 2025-05-19 NOTE — FLOWSHEET NOTE
[] [facility]  Outpatient Rehabilitation &  Therapy  [address]  [phone]  F: [phone] [] [facility]  Outpatient Rehabilitation &  Therapy  [address]   [address]  P: [phone]  F: [phone] [] [facility]  Outpatient Rehabilitation &  Therapy  [address]  P: [phone]  F: [phone]  [x] [facility]  Rehabilitation Services  [address]  [place], [place] [address]  Phone [phone]  Fax [phone]      Occupational Therapy Cancel/No Show note    Date: 2025  Patient: Sulema Macdonald  : 1995  MRN: [id number]    Cancels/No Shows to date: 3/0    For today's appointment patient:    [x]  Cancelled    [] Rescheduled appointment    [] No-show     Reason given by patient:    []  Patient ill    []  Conflicting appointment    [] No transportation      [] Conflict with work    [] No reason given    [] Weather related    [] COVID-19    [x] Other:      Comments:   informed OT that pt left voicemail message around 6 this morning that  she is cancelling today because\"she just worked a double and she is exhausted and she did confirm her next appt\"      [x] Next appointment was confirmed - Pt left message  on voicemail    Electronically signed by: Ana Kapadia OT

## 2025-05-22 ENCOUNTER — HOSPITAL ENCOUNTER (OUTPATIENT)
Dept: OCCUPATIONAL THERAPY | Age: 30
Setting detail: THERAPIES SERIES
Discharge: HOME | End: 2025-05-22
Payer: COMMERCIAL

## 2025-05-22 PROCEDURE — 97140 MANUAL THERAPY 1/> REGIONS: CPT

## 2025-05-22 PROCEDURE — 97035 APP MDLTY 1+ULTRASOUND EA 15: CPT

## 2025-05-22 PROCEDURE — 97110 THERAPEUTIC EXERCISES: CPT

## 2025-05-22 NOTE — FLOWSHEET NOTE
[facility]  Outpatient Rehabilitation &  Therapy  [address]     [phone]  F: [phone]   [] [facility]  Outpatient Rehabilitation &  Therapy  [address]   [address]  P: [phone]  F: [phone] [] [facility]  Outpatient Rehabilitation &  Therapy  [address]  P: [phone]  F: [phone]  [x] [facility]   Outpatient Rehabilitation & Therapy  [address]  P: [phone]   F: [phone]     Occupational Therapy Daily Treatment Note    Date:  2025  Patient Name:  Sulema Macdonald    :  1995  MRN: [id number]  Physician: Aidan Saunders PA-C      Insurance:  [facility].(30 visits). Need authorization after 8th visit  ( benefits)   Pt states she will have [employer] starting 2025   Diagnosis:  Lateral epicondylitis of right elbow [M77.11] Lateral epicondylitis of rt elbow M77.11)   Treatment Diagnosis: Pain rt elbow/forearm, rt UE weakness, swelling rt elbow, tingling rt hand. (ICD-10 code : M25.521, M62.81,M25.421, R20.2)    Onset Date: 2024   Next  Appt: ?  Visit# / total visits: ; Progress note for  patient due at visit 8    Cancels/No Shows: 3/0      Subjective:    Pain:  [x] Yes  [] No Location:  rt elbow/forearm  Pain Rating: (0-10 scale) 6/10  at begin of tx, pain increase to 7.5/10 at end of tx  Pain altered Tx:  [x] No  [] Yes  Action:  Pt Comments: Pt states she had to cancel appt earlier this week because she worked a double at work Pt reports rt elbow/forearm feels tight and sore today. Pt states work was busy today.Pt states she will have [employer] insurance starting in .   Objective:  Modalities:ultrasound  Precautions: none. Pt wears rt black prefab wrist brace during the day with activity.   Exercises:  EXERCISE    REPS/     TIME  WEIGHT/    LEVEL COMMENTS Complete = X  Not done = -   HEP   HEP instruction rt UE stretch (

## 2025-05-27 ENCOUNTER — HOSPITAL ENCOUNTER (OUTPATIENT)
Age: 30
Setting detail: SPECIMEN
Discharge: HOME | End: 2025-05-27

## 2025-05-27 ENCOUNTER — HOSPITAL ENCOUNTER (OUTPATIENT)
Dept: OCCUPATIONAL THERAPY | Age: 30
Setting detail: THERAPIES SERIES
Discharge: HOME | End: 2025-05-27
Payer: COMMERCIAL

## 2025-05-27 LAB — PROLACTIN SERPL-MCNC: 71.5 NG/ML (ref 4.79–23.3)

## 2025-05-27 PROCEDURE — 97537 COMMUNITY/WORK REINTEGRATION: CPT

## 2025-05-27 PROCEDURE — 97140 MANUAL THERAPY 1/> REGIONS: CPT

## 2025-05-27 PROCEDURE — 97110 THERAPEUTIC EXERCISES: CPT

## 2025-05-27 PROCEDURE — 97035 APP MDLTY 1+ULTRASOUND EA 15: CPT

## 2025-05-27 NOTE — FLOWSHEET NOTE
[facility]  Outpatient Rehabilitation &  Therapy  [address]     [phone]  F: [phone]   [] [facility]  Outpatient Rehabilitation &  Therapy  [address]   [address]  P: [phone]  F: [phone] [] [facility]  Outpatient Rehabilitation &  Therapy  [address]  P: [phone]  F: [phone]  [x] [facility]   Outpatient Rehabilitation & Therapy  [address]  P: [phone]   F: [phone]     Occupational Therapy Daily Treatment Note    Date:  2025  Patient Name:  Sulema Macdonald    :  1995  MRN: [id number]  Physician: Aidan Saunders PA-C      Insurance:  [facility] [facility](30 visits). Need authorization after 8th visit  ( benefits)   Pt states she will have [employer] starting 2025   Diagnosis:  Lateral epicondylitis of right elbow [M77.11] Lateral epicondylitis of rt elbow M77.11)   Treatment Diagnosis: Pain rt elbow/forearm, rt UE weakness, swelling rt elbow, tingling rt hand. (ICD-10 code : M25.521, M62.81,M25.421, R20.2)    Onset Date: 2024   Next  Appt: ?  Visit# / total visits: ; Progress note for  patient due at visit 8    Cancels/No Shows: 3/0      Subjective:    Pain:  [x] Yes  [] No Location:  rt elbow/forearm  Pain Rating: (0-10 scale) 4/10    Pain altered Tx:  [x] No  [] Yes  Action:  Pt Comments: . Pt reports she has the day off and rt elbow is a little stiff. Pt reports decrease swelling rt elbow.  Objective:  Modalities:ultrasound  Precautions: none. Pt wears rt black prefab wrist brace during the day with activity.   Exercises:  EXERCISE    REPS/     TIME  WEIGHT/    LEVEL COMMENTS Complete = X  Not done = -   HEP   HEP instruction rt UE stretch ( extend rt elbow with slight flexion & extend wrist (stretch with lt hand ) for 10 seconds, extend rt elbow with slight flexion & flex wrist (stretch with lt hand ) for seconds. Repeat for 2

## 2025-05-28 ENCOUNTER — HOSPITAL ENCOUNTER (EMERGENCY)
Age: 30
Discharge: HOME | End: 2025-05-28
Attending: EMERGENCY MEDICINE
Payer: COMMERCIAL

## 2025-05-28 VITALS
HEART RATE: 97 BPM | WEIGHT: 250 LBS | TEMPERATURE: 97.6 F | RESPIRATION RATE: 18 BRPM | OXYGEN SATURATION: 97 % | BODY MASS INDEX: 46.01 KG/M2 | DIASTOLIC BLOOD PRESSURE: 86 MMHG | SYSTOLIC BLOOD PRESSURE: 152 MMHG | HEIGHT: 62 IN

## 2025-05-28 DIAGNOSIS — L02.91 ABSCESS: Primary | ICD-10-CM

## 2025-05-28 PROCEDURE — 99283 EMERGENCY DEPT VISIT LOW MDM: CPT

## 2025-05-28 PROCEDURE — 6360000002 HC RX W HCPCS: Performed by: PHYSICIAN ASSISTANT

## 2025-05-28 PROCEDURE — 10060 I&D ABSCESS SIMPLE/SINGLE: CPT

## 2025-05-28 RX ORDER — LIDOCAINE HYDROCHLORIDE 10 MG/ML
5 INJECTION, SOLUTION INFILTRATION; PERINEURAL ONCE
Status: COMPLETED | OUTPATIENT
Start: 2025-05-28 | End: 2025-05-28

## 2025-05-28 RX ORDER — CEPHALEXIN 500 MG/1
500 CAPSULE ORAL 4 TIMES DAILY
Qty: 28 CAPSULE | Refills: 0 | Status: SHIPPED | OUTPATIENT
Start: 2025-05-28 | End: 2025-06-04

## 2025-05-28 RX ORDER — SULFAMETHOXAZOLE AND TRIMETHOPRIM 800; 160 MG/1; MG/1
1 TABLET ORAL 2 TIMES DAILY
Qty: 14 TABLET | Refills: 0 | Status: SHIPPED | OUTPATIENT
Start: 2025-05-28 | End: 2025-06-04

## 2025-05-28 RX ADMIN — LIDOCAINE HYDROCHLORIDE 5 ML: 10 INJECTION, SOLUTION INFILTRATION; PERINEURAL at 16:13

## 2025-05-28 ASSESSMENT — PAIN - FUNCTIONAL ASSESSMENT: PAIN_FUNCTIONAL_ASSESSMENT: 0-10

## 2025-05-28 ASSESSMENT — PAIN SCALES - GENERAL: PAINLEVEL_OUTOF10: 6

## 2025-05-28 NOTE — ED PROVIDER NOTES
EMERGENCY DEPARTMENT ENCOUNTER    Pt Name: Sulema Macdonald  MRN: [id number]  Birthdate 1995  Date of evaluation: 5/28/25  CHIEF COMPLAINT       Chief Complaint   Patient presents with    Abscess     HISTORY OF PRESENT ILLNESS   Presents to the ED for evaluation of abscess to her pelvic area.  Began 1 week ago.  Patient states that it started as a small ingrown hair and has increased in size.  States is very painful to sit and her pants rub on the area.  She denies any drainage.  No fevers, chills, nausea, vomiting.  She has tried hot baths, Epsom salt baths, warm compresses with no real relief.  Patient is not diabetic.  No other complaints.    The history is provided by the patient.           PASTMEDICAL HISTORY     Past Medical History:   Diagnosis Date    Anxiety     Asthma     Chronic diarrhea     Depression     Headache     HPV (human papilloma virus) infection     IBS (irritable bowel syndrome)     Nausea     PTSD (post-traumatic stress disorder)      Past Problem List  Patient Active Problem List   Diagnosis Code    IBS (irritable bowel syndrome) K58.9    Headache R51.9    Depression F32.A    Asthma J45.909    Anxiety F41.9    Class 2 obesity due to excess calories without serious comorbidity in adult E66.812, E66.09    Tobacco abuse Z72.0    Marijuana abuse F12.10    S/P laparoscopic cholecystectomy Z90.49    HPV (human papilloma virus) infection B97.7    Chronic migraine [id number]    Back pain M54.9    Sleep disturbance G47.9     SURGICAL HISTORY       Past Surgical History:   Procedure Laterality Date    CHOLECYSTECTOMY, LAPAROSCOPIC N/A 6/22/2020    CHOLECYSTECTOMY LAPAROSCOPIC performed by Marce Banuelos MD at [email or web address] OR    CHOLECYSTECTOMY, LAPAROSCOPIC  06/2020    COLONOSCOPY N/A 4/7/2021    COLONOSCOPY WITH BIOPSY performed by Dionicio Eid MD at [facility] [[email or web address] ENDO    TONGUE SURGERY      WISDOM TOOTH EXTRACTION       CURRENT MEDICATIONS       Discharge Medication List as of 5/28/2025  4:12 PM        CONTINUE these

## 2025-05-28 NOTE — DISCHARGE INSTRUCTIONS
Please follow-up with your PCP.  Recommend warm compresses.  Return to the ED if you develop any worsening abscess formation, redness, swelling, drainage, fevers, chills, vomiting or any other concerning symptoms.

## 2025-05-29 ENCOUNTER — HOSPITAL ENCOUNTER (OUTPATIENT)
Dept: OCCUPATIONAL THERAPY | Age: 30
Setting detail: THERAPIES SERIES
Discharge: HOME | End: 2025-05-29
Payer: COMMERCIAL

## 2025-05-29 PROCEDURE — 97110 THERAPEUTIC EXERCISES: CPT

## 2025-05-29 PROCEDURE — 97140 MANUAL THERAPY 1/> REGIONS: CPT

## 2025-05-29 PROCEDURE — 97035 APP MDLTY 1+ULTRASOUND EA 15: CPT

## 2025-05-29 NOTE — PROGRESS NOTES
Complete = X  Not done = -   HEP   HEP instruction rt UE stretch ( extend rt elbow with slight flexion & extend wrist (stretch with lt hand ) for 10 seconds, extend rt elbow with slight flexion & flex wrist (stretch with lt hand ) for seconds. Repeat for 2 sets of 10.   HEP yellow theraputty -rt hand strengthening (squeezes, rolling putty on table, pinch putty with MP flexion, pinch/pull putty(horizontal, vertical)  HEP red theraband- (elbow flex & extension,wrist flex & extension, forearm supination & pronation, shoulder horizontal abduction) 4/25/2025 5/1/2025 yellow theraputty      5/15/2025 rt theraband for rt UE strengthening   ultrasound rt elbow /proximal forearm 1.5 w/cm2 for 8 minutes     X   PROM rt elbow/wrist  Massage rt elbow,forearm     X   Yellow therapy ball on table bilateral UE  20x each way   Roll ball forward/back ,circles cw & ccw, side to side, press down top of ball, press into sides of ball (Pt stand)  Raise therapy ball up/down, scapula pro/retraction with elbow flex/extension X       Red digiflex rt hand  20x   20x 3#  3# Gripping   Each finger oppositional pinch, thumb lateral pinch  -  -   BAPs balls (L5,4, 3, 2, 1) 10 turns each waycw & ccw   -   Self rom with clasp hands  10x each way  Elbow flex/extension, forearm supination /pronation  -   Rt wrist over ramp AROM  10x each way   Palm down (flex/ext), RD/UD, palm up (flex/ext) -   Rt wrist over ramp 2 sets 10x each way  2# Palm down (flex/ext), RD/UD, palm up (flex/ext) X   Rt elbow/forearm  2 sets 10x each way 2# Elbow flex/extension, forearm supination /pronation X   Juxaciser rt UE over & back 4 sets   Over and back  X   Joe grooved Key peg -rt hand   Place all 25 pegs,remove & hold pegs in hand, then release pegs X   Red theraband flex bar  20x each way  6# Twist, make U, reverse U, make C, reverse C  X   Press cone in putty rt hand 20x each way   Press & (hold small end cone,hold large end cone) X   Red power web rt hand

## 2025-05-29 NOTE — ED PROVIDER NOTES
eMERGENCY dEPARTMENT  Attending Physician Attestation     Pt Name: Sulema Macdonald  MRN: [id number]  Birthdate 1995  Date of evaluation: 5/29/25     Sulema Macdonald is a 30 y.o. female with CC: Abscess      Based on the medical record the care appears appropriate.  I was personally available for consultation in the Emergency Department.    Wilian Ferrell MD  Attending Emergency Physician                  Wilian Ferrell MD  05/29/25 1953       Wilian Ferrell MD  05/29/25 1954

## 2025-06-06 DIAGNOSIS — N39.41 URGE INCONTINENCE: Primary | ICD-10-CM

## 2025-06-06 DIAGNOSIS — R35.0 FREQUENCY OF URINATION: ICD-10-CM

## 2025-06-06 DIAGNOSIS — R35.1 NOCTURIA: ICD-10-CM

## 2025-06-06 NOTE — TELEPHONE ENCOUNTER
Patient would like to have prescription sent to pharmacy. Patient just changed insurance to [employer] and now needs a script sent to [facility].     LOV: 03/17/25  NOV: 09/15/25

## 2025-06-09 RX ORDER — MIRABEGRON 50 MG/1
50 TABLET, FILM COATED, EXTENDED RELEASE ORAL DAILY
Qty: 90 TABLET | Refills: 3 | Status: SHIPPED | OUTPATIENT
Start: 2025-06-09

## 2025-06-11 ENCOUNTER — CLINICAL DOCUMENTATION (OUTPATIENT)
Dept: OCCUPATIONAL THERAPY | Age: 30
End: 2025-06-11

## 2025-06-11 NOTE — FLOWSHEET NOTE
[facility]   OCCUPATIONAL THERAPY NOTE   OUTPATIENT   Date: 25  Patient Name: Sulema Macdonald         MRN: [id number]   Account #:     : 1995  (30 y.o.)  Gender: female       OTR/L called pt at 15:40 and left voicemail message that therapy has insurance approval & call [phone] to schedule more appts.              Electronically signed by Ana Kapadia OT on 25 at 3:43 PM EDT

## 2025-06-24 ENCOUNTER — HOSPITAL ENCOUNTER (OUTPATIENT)
Dept: OCCUPATIONAL THERAPY | Age: 30
Setting detail: THERAPIES SERIES
Discharge: HOME | End: 2025-06-24

## 2025-06-24 NOTE — FLOWSHEET NOTE
[] [facility]  Outpatient Rehabilitation &  Therapy  [address]  [phone]  F: [phone] [] [facility]  Outpatient Rehabilitation &  Therapy  [address]   [address]  P: [phone]  F: [phone] [] [facility]  Outpatient Rehabilitation &  Therapy  [address]  P: [phone]  F: [phone]  [x] [facility]  Rehabilitation Services  [address]  [place], [place] [address]  Phone [phone]  Fax [phone]      Occupational Therapy Cancel/No Show note    Date: 2025  Patient: Sulema Macdonald  : 1995  MRN: [id number]    Cancels/No Shows to date:     For today's appointment patient:    [x]  Cancelled    [] Rescheduled appointment    [] No-show     Reason given by patient:    []  Patient ill    []  Conflicting appointment    [] No transportation      [] Conflict with work    [] No reason given    [] Weather related    [] COVID-19    [x] Other:      Comments:  Per  no reason given but pt rescheduled for next week.      [x] Next appointment was confirmed by   Electronically signed by: Ana Kapadia OT

## 2025-07-01 ENCOUNTER — HOSPITAL ENCOUNTER (OUTPATIENT)
Dept: OCCUPATIONAL THERAPY | Age: 30
Setting detail: THERAPIES SERIES
Discharge: HOME | End: 2025-07-01

## 2025-07-01 NOTE — FLOWSHEET NOTE
[] [facility]  Outpatient Rehabilitation &  Therapy  [address]  [phone]  F: [phone] [] [facility]  Outpatient Rehabilitation &  Therapy  [address]   [address]  P: [phone]  F: [phone] [] [facility]  Outpatient Rehabilitation &  Therapy  [address]  P: [phone]  F: [phone]  [x] [facility]  Rehabilitation Services  [address]  [place], [place] [address]  Phone [phone]  Fax [phone]      Occupational Therapy Cancel/No Show note    Date: 2025  Patient: Sulema Macdonald  : 1995  MRN: [id number]    Cancels/No Shows to date:     For today's appointment patient:    [x]  Cancelled    [] Rescheduled appointment    [] No-show     Reason given by patient:    []  Patient ill    []  Conflicting appointment    [x] No transportation      [] Conflict with work    [] No reason given    [] Weather related    [] COVID-19    [x] Other:      Comments:   informed OT that pt called and cancel d/t missing the bus.      [x] Next appointment was confirmed.  rescheduled pt for next week.    Electronically signed by: Ana Kapadia OT

## 2025-07-08 ENCOUNTER — HOSPITAL ENCOUNTER (OUTPATIENT)
Dept: OCCUPATIONAL THERAPY | Age: 30
Setting detail: THERAPIES SERIES
Discharge: HOME | End: 2025-07-08
Payer: COMMERCIAL

## 2025-07-08 PROCEDURE — 97110 THERAPEUTIC EXERCISES: CPT

## 2025-07-08 PROCEDURE — 97140 MANUAL THERAPY 1/> REGIONS: CPT

## 2025-07-08 NOTE — FLOWSHEET NOTE
flat hand press down  X   Metal hand gripper rt hand 2 sets 20x 35# Gripping  X                                      Other:     4 = No Difficulty     Treatment Charges: Mins Units Time In/Out [facility]   []  Modalities:       [x]  Ultrasound 8 0 09:05-09:13   [x]  Ther Exercise 12 1 09:23-09:35   [x]  Manual Therapy 10 1 09:13:09:23   []  Ther Activities      []  Orthotic fit/train      []  Orthotic recheck      []  Other      Total untimed      Total Timed 30 2          Assessment: [x] Progressing toward goals.Pt's tx shortened d/t pt late because of transportation. Tx includes  rt forearm /elbow ultrasound, rt hand/wrist/forearm & elbow PROM & strengthening exercises. Soft tissue massage rt forearm decreases muscle tightness forearm extensor muscles. Pt tolerates tx well.  [] Other     [x] Patient would continue to benefit from skilled occupational therapy services in order to: provide manual therapy (52756), therapeutic exercises (20975), ultrasound (92768), & therapeutic activities(74423) to faciltate increase rt UE & hand strength,decrease pain,decrease elbow swelling,to return pt prior level of functioning.     STG/LTG     04/25/25 1604 Date: 5/29/2025   Patient Goals  Goal Status   Patient goals  Pt's goal is to not be in pain(rt elbow) anymore Pt making progress/goal ongoing   Short Term Goals    Time Frame for Short Term Goals 8 visits    Short Term Goal 1 Pt will demo & report independence with rt UE HEP Goal Met   Short Term Goal 2 Pt will report decrease rt elbow/forearm pain to 6/10 when doing home  & work activities, & rt arm exercises. Goal Met   Short Term Goal 3 Pt will report increase in UEFI total score by 9 points & less difficulty using rt arm for ADLs/ IADLS Goal Met  (increase UEFI total score by 19 points)   Short Term Goal 4 Decrease swelling rt elbow crease girth by .5cm Goal Met   Short Term Goal 5 Increase rt hand strength ( by 5#, morris & tip pinch by 2#) to improve strength when

## 2025-07-10 ENCOUNTER — HOSPITAL ENCOUNTER (OUTPATIENT)
Dept: OCCUPATIONAL THERAPY | Age: 30
Setting detail: THERAPIES SERIES
Discharge: HOME | End: 2025-07-10
Payer: COMMERCIAL

## 2025-07-10 NOTE — FLOWSHEET NOTE
[] [facility]  Outpatient Rehabilitation &  Therapy  [address]  [phone]  F: [phone] [] [facility]  Outpatient Rehabilitation &  Therapy  [address]   [address]  P: [phone]  F: [phone] [] [facility]  Outpatient Rehabilitation &  Therapy  [address]  P: [phone]  F: [phone]  [x] [facility]  Rehabilitation Services  [address]  [place], [place] [address]  Phone [phone]  Fax [phone]      Occupational Therapy Cancel/No Show note    Date: 7/10/2025  Patient: Sulema Macdonald  : 1995  MRN: [id number]    Cancels/No Shows to date:     For today's appointment patient:    [x]  Cancelled    [] Rescheduled appointment    [] No-show     Reason given by patient:    []  Patient ill    []  Conflicting appointment    [] No transportation      [] Conflict with work    [] No reason given    [] Weather related    [] COVID-19    [x] Other:      Comments:   informed OT that pt called to cancel as she is still on the bus and would bee late for appointment       [x] Next appointment was confirmed.    Electronically signed by: José Antonio Yost OTR/TATIANNA

## 2025-07-15 ENCOUNTER — HOSPITAL ENCOUNTER (OUTPATIENT)
Dept: OCCUPATIONAL THERAPY | Age: 30
Setting detail: THERAPIES SERIES
Discharge: HOME | End: 2025-07-15
Payer: COMMERCIAL

## 2025-07-15 NOTE — FLOWSHEET NOTE
[] [facility]  Outpatient Rehabilitation &  Therapy  [address]  [phone]  F: [phone] [] [facility]  Outpatient Rehabilitation &  Therapy  [address]   [address]  P: [phone]  F: [phone] [] [facility]  Outpatient Rehabilitation &  Therapy  [address]  P: [phone]  F: [phone]  [x] [facility]  Rehabilitation Services  [address]  [place], [place] [address]  Phone [phone]  Fax [phone]      Occupational Therapy Cancel/No Show note    Date: 7/15/2025  Patient: Sulema Macdonald  : 1995  MRN: [id number]    Cancels/No Shows to date:     For today's appointment patient:    []  Cancelled    [] Rescheduled appointment    [x] No-show     Reason given by patient:    []  Patient ill    []  Conflicting appointment    [] No transportation      [] Conflict with work    [] No reason given    [] Weather related    [] COVID-19    [x] Other:      Comments:  OT called pt and she forgot appt.      [x] Next appointment was confirmed by OT.    Electronically signed by: Ana Kapadia OT

## 2025-07-17 ENCOUNTER — HOSPITAL ENCOUNTER (OUTPATIENT)
Dept: OCCUPATIONAL THERAPY | Age: 30
Setting detail: THERAPIES SERIES
Discharge: HOME | End: 2025-07-17
Payer: COMMERCIAL

## 2025-07-17 NOTE — FLOWSHEET NOTE
[] [facility]  Outpatient Rehabilitation &  Therapy  [address]  [phone]  F: [phone] [] [facility]  Outpatient Rehabilitation &  Therapy  [address]   [address]  P: [phone]  F: [phone] [] [facility]  Outpatient Rehabilitation &  Therapy  [address]  P: [phone]  F: [phone]  [x] [facility]  Rehabilitation Services  [address]  [place], [place] [address]  Phone [phone]  Fax [phone]      Occupational Therapy Cancel/No Show note    Date: 2025  Patient: Sulema Macdonald  : 1995  MRN: [id number]    Cancels/No Shows to date:     For today's appointment patient:    [x]  Cancelled    [] Rescheduled appointment    [] No-show     Reason given by patient:    []  Patient ill    []  Conflicting appointment    [] No transportation      [x] Conflict with work    [] No reason given    [] Weather related    [] COVID-19    [x] Other:      Comments:   informed OT that pt cancel because she is stuck at work and she said she will call back to R/S .      [] Next appointment was confirmed    Electronically signed by: Ana Kapadia OT

## 2025-07-18 DIAGNOSIS — N39.41 URGE INCONTINENCE: ICD-10-CM

## 2025-07-18 DIAGNOSIS — R35.1 NOCTURIA: ICD-10-CM

## 2025-07-18 DIAGNOSIS — R35.0 FREQUENCY OF URINATION: ICD-10-CM

## 2025-07-18 NOTE — TELEPHONE ENCOUNTER
Medication name:   MYRBETRIQ   Medication dosage: 50 mg  Preferred pharmacy:     Last office visit: 3/17/25  Next office (or follow up plan- may copy and paste from last office visit at bottom):  Assessment & Plan  Return in about 6 months (around 9/17/2025).

## 2025-07-21 RX ORDER — MIRABEGRON 50 MG/1
50 TABLET, FILM COATED, EXTENDED RELEASE ORAL DAILY
Qty: 90 TABLET | Refills: 1 | Status: SHIPPED | OUTPATIENT
Start: 2025-07-21

## 2025-08-15 ENCOUNTER — CLINICAL DOCUMENTATION (OUTPATIENT)
Dept: OCCUPATIONAL THERAPY | Age: 30
End: 2025-08-15

## (undated) DEVICE — ST CHARLES GEN LAPAROSCOPY PK: Brand: MEDLINE INDUSTRIES, INC.

## (undated) DEVICE — SINGLE PORT MANIFOLD: Brand: NEPTUNE 2

## (undated) DEVICE — TROCAR: Brand: KII SHIELDED BLADED ACCESS SYSTEM

## (undated) DEVICE — AGENT HEMSTAT W2XL14IN OXIDIZED REGENERATED CELOS ABSRB FOR

## (undated) DEVICE — SUTURE PERMAHAND SZ 0 L30IN NONABSORBABLE BLK FSL L30MM 3/8 680H

## (undated) DEVICE — TROCAR: Brand: KII® SLEEVE

## (undated) DEVICE — SUTURE MCRYL + SZ 4-0 L27IN ABSRB UD L19MM PS-2 3/8 CIR MCP426H

## (undated) DEVICE — DISSECTOR LAP DIA5MM BLNT TIP ENDOPATH

## (undated) DEVICE — BAG SPEC REM 224ML W4XL6IN DIA10MM 1 HND GYN DISP ENDOPCH

## (undated) DEVICE — ELECTRODE LAPAROSCOPIC LHOOK

## (undated) DEVICE — Z DISCONTINUED PER MEDLINE SOLUTION ANTIFOG 6ML NONABRASIVE NONTOXIC MEDICHOICE

## (undated) DEVICE — TROCARS: Brand: KII® BLUNT TIP ACCESS SYSTEM

## (undated) DEVICE — GLOVE SURG SZ 65 THK91MIL LTX FREE SYN POLYISOPRENE

## (undated) DEVICE — SCISSOR SURG CRV ENDOCUT TIP FOR LAP DISP

## (undated) DEVICE — APPLIER CLP M/L SHFT DIA5MM 15 LIG LIGAMAX 5

## (undated) DEVICE — Z DUP USE 2257490 ADHESIVE SKIN CLSRE 036ML TPCL 2CTL CNCRLTE HIGH VSCSTY DRMB

## (undated) DEVICE — GAUZE,SPONGE,4"X4",16PLY,XRAY,STRL,LF: Brand: MEDLINE

## (undated) DEVICE — SUTURE VCRL + SZ 0 L27IN ABSRB VLT L26MM UR-6 5/8 CIR VCP603H

## (undated) DEVICE — MERCY HEALTH ST CHARLES: Brand: MEDLINE INDUSTRIES, INC.

## (undated) DEVICE — DEFENDO AIR WATER SUCTION AND BIOPSY VALVE KIT FOR  OLYMPUS: Brand: DEFENDO AIR/WATER/SUCTION AND BIOPSY VALVE

## (undated) DEVICE — KIT DRN FLAT W/ 100CC EVAC 10MM FULL PERF

## (undated) DEVICE — FORCEPS BX L240CM WRK CHN 2.8MM STD CAP W/ NDL MIC MESH

## (undated) DEVICE — ENDO KIT W/SYRINGE: Brand: MEDLINE INDUSTRIES, INC.

## (undated) DEVICE — SPONGE DRN W4XL4IN RAYON/POLYESTER 6 PLY NONWOVEN PRECUT

## (undated) DEVICE — SOLUTION IV 1000ML 0.9% SOD CHL PH 5 INJ USP VIAFLX PLAS

## (undated) DEVICE — Device